# Patient Record
Sex: FEMALE | Employment: FULL TIME | URBAN - METROPOLITAN AREA
[De-identification: names, ages, dates, MRNs, and addresses within clinical notes are randomized per-mention and may not be internally consistent; named-entity substitution may affect disease eponyms.]

---

## 2020-08-28 ENCOUNTER — OFFICE VISIT (OUTPATIENT)
Dept: FAMILY MEDICINE CLINIC | Facility: CLINIC | Age: 40
End: 2020-08-28
Payer: COMMERCIAL

## 2020-08-28 VITALS
SYSTOLIC BLOOD PRESSURE: 146 MMHG | RESPIRATION RATE: 18 BRPM | WEIGHT: 204.5 LBS | HEIGHT: 66 IN | BODY MASS INDEX: 32.87 KG/M2 | HEART RATE: 98 BPM | DIASTOLIC BLOOD PRESSURE: 88 MMHG

## 2020-08-28 DIAGNOSIS — I10 ESSENTIAL HYPERTENSION: ICD-10-CM

## 2020-08-28 DIAGNOSIS — G89.29 CHRONIC PAIN OF RIGHT KNEE: Primary | ICD-10-CM

## 2020-08-28 DIAGNOSIS — M25.561 CHRONIC PAIN OF RIGHT KNEE: Primary | ICD-10-CM
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
## 2020-08-28 PROCEDURE — 3079F DIAST BP 80-89 MM HG: CPT | Performed by: PHYSICIAN ASSISTANT

## 2020-08-28 PROCEDURE — 3008F BODY MASS INDEX DOCD: CPT | Performed by: PHYSICIAN ASSISTANT

## 2020-08-28 PROCEDURE — 3077F SYST BP >= 140 MM HG: CPT | Performed by: PHYSICIAN ASSISTANT

## 2020-08-28 PROCEDURE — 99202 OFFICE O/P NEW SF 15 MIN: CPT | Performed by: PHYSICIAN ASSISTANT

## 2020-08-28 RX ORDER — TRAMADOL HYDROCHLORIDE 50 MG/1
50 TABLET ORAL EVERY 8 HOURS PRN
COMMUNITY
Start: 2020-08-23 | End: 2020-08-28

## 2020-08-28 RX ORDER — IBUPROFEN 800 MG/1
800 TABLET ORAL EVERY 8 HOURS PRN
COMMUNITY
Start: 2020-05-09 | End: 2020-08-28

## 2020-08-28 RX ORDER — IBUPROFEN 800 MG/1
800 TABLET ORAL EVERY 8 HOURS PRN
Qty: 90 TABLET | Refills: 1 | Status: SHIPPED | OUTPATIENT
Start: 2020-08-28 | End: 2021-09-10

## 2020-08-28 RX ORDER — TRAMADOL HYDROCHLORIDE 50 MG/1
50 TABLET ORAL EVERY 8 HOURS PRN
Qty: 30 TABLET | Refills: 2 | Status: SHIPPED | OUTPATIENT
Start: 2020-08-28 | End: 2020-09-22

## 2020-08-28 NOTE — PROGRESS NOTES
HPI:     HPI  38year-old female is here in the office and complaints of right knee pain for years. Patient had right knee surgery in 2015. The pain is at a severity of 8/10, does not radiate.    Patient denies of chest pain, SOB, N/V/C/D, fever, dizziness, Frequency: Monthly or less        Drinks per session: 1 or 2        Binge frequency: Never      Drug use: Never      Sexual activity: Not on file    Lifestyle      Physical activity:        Days per week: Not on file        Minutes per session: Not on file atraumatic. Right Ear: External ear normal.   Left Ear: External ear normal.   Nose: Nose normal.   Eyes: Conjunctivae are normal. Right eye exhibits no discharge. Left eye exhibits no discharge.    Cardiovascular: Normal rate, regular rhythm, S1 normal,

## 2020-08-28 NOTE — ASSESSMENT & PLAN NOTE
Patient has Lisinopril ( unsure dosing) at home but does not take it. Advise patient to take Lisinopril daily.

## 2020-09-22 RX ORDER — TRAMADOL HYDROCHLORIDE 50 MG/1
50 TABLET ORAL EVERY 8 HOURS PRN
Qty: 30 TABLET | Refills: 2 | OUTPATIENT
Start: 2020-09-22

## 2020-09-22 RX ORDER — TRAMADOL HYDROCHLORIDE 50 MG/1
50 TABLET ORAL EVERY 8 HOURS PRN
Qty: 30 TABLET | Refills: 2 | Status: SHIPPED | OUTPATIENT
Start: 2020-09-22 | End: 2020-10-19

## 2020-10-20 RX ORDER — TRAMADOL HYDROCHLORIDE 50 MG/1
50 TABLET ORAL EVERY 8 HOURS PRN
Qty: 30 TABLET | Refills: 2 | Status: SHIPPED | OUTPATIENT
Start: 2020-10-20 | End: 2020-10-24

## 2020-10-24 ENCOUNTER — TELEPHONE (OUTPATIENT)
Dept: FAMILY MEDICINE CLINIC | Facility: CLINIC | Age: 40
End: 2020-10-24

## 2020-10-24 RX ORDER — TRAMADOL HYDROCHLORIDE 50 MG/1
50 TABLET ORAL EVERY 8 HOURS PRN
Qty: 30 TABLET | Refills: 2 | Status: SHIPPED | OUTPATIENT
Start: 2020-10-24 | End: 2020-12-15

## 2020-10-24 NOTE — TELEPHONE ENCOUNTER
Shoaib/ Pharmacist of [address] [place], [place] [address]linnette Doll is requesting that script traMADol HCl 50 MG Oral Tab be transferred to their pharmacy in [facility].     Fax# [phone]

## 2020-10-24 NOTE — TELEPHONE ENCOUNTER
Chart reviewed, the rx was sent to the [facility] on [address] in [facility], it should actually go to [id number] in [place] [address], sent to provider to be signed and routed to the new pharmacy

## 2020-10-26 RX ORDER — TRAMADOL HYDROCHLORIDE 50 MG/1
50 TABLET ORAL EVERY 8 HOURS PRN
Qty: 30 TABLET | Refills: 0 | OUTPATIENT
Start: 2020-10-26

## 2020-11-13 RX ORDER — TRAMADOL HYDROCHLORIDE 50 MG/1
50 TABLET ORAL EVERY 8 HOURS PRN
Qty: 30 TABLET | Refills: 2 | OUTPATIENT
Start: 2020-11-13

## 2020-11-14 RX ORDER — TRAMADOL HYDROCHLORIDE 50 MG/1
50 TABLET ORAL EVERY 8 HOURS PRN
Qty: 30 TABLET | Refills: 2 | OUTPATIENT
Start: 2020-11-14

## 2020-11-17 RX ORDER — TRAMADOL HYDROCHLORIDE 50 MG/1
50 TABLET ORAL EVERY 8 HOURS PRN
Qty: 30 TABLET | Refills: 0 | OUTPATIENT
Start: 2020-11-17

## 2020-12-15 ENCOUNTER — OFFICE VISIT (OUTPATIENT)
Dept: FAMILY MEDICINE CLINIC | Facility: CLINIC | Age: 40
End: 2020-12-15
Payer: COMMERCIAL

## 2020-12-15 VITALS
HEART RATE: 104 BPM | HEIGHT: 66 IN | SYSTOLIC BLOOD PRESSURE: 137 MMHG | WEIGHT: 199 LBS | DIASTOLIC BLOOD PRESSURE: 80 MMHG | BODY MASS INDEX: 31.98 KG/M2

## 2020-12-15 DIAGNOSIS — Z12.31 ENCOUNTER FOR SCREENING MAMMOGRAM FOR BREAST CANCER: Primary | ICD-10-CM

## 2020-12-15 DIAGNOSIS — Z00.00 WELLNESS EXAMINATION: ICD-10-CM

## 2020-12-15 DIAGNOSIS — G89.29 CHRONIC PAIN OF RIGHT KNEE: ICD-10-CM

## 2020-12-15 DIAGNOSIS — M25.561 CHRONIC PAIN OF RIGHT KNEE: ICD-10-CM

## 2020-12-15 DIAGNOSIS — Z00.00 ROUTINE GENERAL MEDICAL EXAMINATION AT A HEALTH CARE FACILITY: ICD-10-CM

## 2020-12-15 PROCEDURE — 3008F BODY MASS INDEX DOCD: CPT | Performed by: PHYSICIAN ASSISTANT

## 2020-12-15 PROCEDURE — 3079F DIAST BP 80-89 MM HG: CPT | Performed by: PHYSICIAN ASSISTANT

## 2020-12-15 PROCEDURE — 99396 PREV VISIT EST AGE 40-64: CPT | Performed by: PHYSICIAN ASSISTANT

## 2020-12-15 PROCEDURE — 3075F SYST BP GE 130 - 139MM HG: CPT | Performed by: PHYSICIAN ASSISTANT

## 2020-12-15 RX ORDER — TRAMADOL HYDROCHLORIDE 50 MG/1
50 TABLET ORAL EVERY 8 HOURS PRN
Qty: 30 TABLET | Refills: 2 | OUTPATIENT
Start: 2020-12-15

## 2020-12-15 RX ORDER — TRAMADOL HYDROCHLORIDE 50 MG/1
50 TABLET ORAL EVERY 8 HOURS PRN
Qty: 30 TABLET | Refills: 2 | Status: SHIPPED | OUTPATIENT
Start: 2020-12-15 | End: 2021-03-09

## 2020-12-15 NOTE — PATIENT INSTRUCTIONS
Prevention Guidelines, Women Ages 36 to 52  Screening tests and vaccines are an important part of managing your health. A screening test is done to find diseases in people who don't have any symptoms.  The goal is to find a disease early so lifestyle govea sigmoidoscopy every 5 years, or  · Colonoscopy every 10 years, or  · CT colonography (virtual colonoscopy) every 5 years, or  · Yearly fecal occult blood test, or  · Yearly fecal immunochemical test every year, or  · Stool DNA test, every 3 years  If you c least 4 weeks after the first dose   Hepatitis A Women at increased risk for infection–talk with your healthcare provider 2 doses given 6 months apart   Hepatitis B Women at increased risk for infection–talk with your healthcare provider 3 doses over 6 mon American Academy of Ophthalmology  Preet last reviewed this educational content on 11/1/2017  © 1622-4172 [employer] [address] [address]. [address] [facility], [address] [place] [address]. All rights reserved.  This information is not intended as a substitute for pro

## 2020-12-16 NOTE — PROGRESS NOTES
HPI:   Marilu Bernal is a 38year old female who presents for an Annual Health Visit. Patient complaints of chronic right knee pain and requests a referral for Ortho. Patient is currently taking Tramadol as needed for pain with moderate relief.   Vicky Hardy Wt Readings from Last 6 Encounters:  12/15/20 : 199 lb (90.3 kg)  08/28/20 : 204 lb 8 oz (92.8 kg)    Body mass index is 32.12 kg/m². Physical Exam   Constitutional: She is oriented to person, place, and time.  She appears well-developed and well-nourish -     ORTHOPEDIC - INTERNAL    Other orders  -     traMADol HCl 50 MG Oral Tab; Take 1 tablet (50 mg total) by mouth every 8 (eight) hours as needed.       Overall health discussed, exercise/activity appropriate for age and health status, heathy diety, prev Cervical cancer All women in this age group, except women who have had a complete hysterectomy Pap test every 3 years or Pap test plus human papilloma virus (HPV) test every 5 years   Colorectal cancer Women age 39 years and older at average risk Multiple Vision All women in this age group Complete exam at age 36 and eye exams every 2 to 4 years. If you have a chronic disease, ask your healthcare provider how often you should have your eyes examined. 4   Vaccine Who needs it How often   Chickenpox (varicella Domestic violence Women at the age in which they are able to have children At routine exams   Sexually transmitted infection prevention Women at increased risk for infection–talk with your healthcare provider At routine exams   Use of tobacco and the healt

## 2021-01-06 ENCOUNTER — TELEPHONE (OUTPATIENT)
Dept: SCHEDULING | Age: 41
End: 2021-01-06

## 2021-01-06 RX ORDER — TRAMADOL HYDROCHLORIDE 50 MG/1
50 TABLET ORAL EVERY 8 HOURS PRN
Qty: 30 TABLET | Refills: 2 | OUTPATIENT
Start: 2021-01-06

## 2021-01-07 RX ORDER — TRAMADOL HYDROCHLORIDE 50 MG/1
50 TABLET ORAL EVERY 8 HOURS PRN
Qty: 30 TABLET | Refills: 2 | OUTPATIENT
Start: 2021-01-07

## 2021-01-08 ENCOUNTER — PATIENT MESSAGE (OUTPATIENT)
Dept: OTHER | Age: 41
End: 2021-01-08

## 2021-01-08 RX ORDER — TRAMADOL HYDROCHLORIDE 50 MG/1
50 TABLET ORAL EVERY 6 HOURS PRN
Qty: 30 TABLET | Refills: 0 | Status: SHIPPED | OUTPATIENT
Start: 2021-01-08 | End: 2021-01-21

## 2021-01-08 RX ORDER — TRAMADOL HYDROCHLORIDE 50 MG/1
50 TABLET ORAL EVERY 8 HOURS PRN
Qty: 30 TABLET | Refills: 2 | OUTPATIENT
Start: 2021-01-08

## 2021-01-08 NOTE — TELEPHONE ENCOUNTER
Patient following up for refills of Tramadol, patient reports has been taking Tramadol 3-4 tabs daily, reports has a lot of pain and is scheduling MRI soon.  Confirmed with pharmacy, patient has picked up all 3 refills of Tramadol sent 12/15, refills will l

## 2021-01-09 NOTE — TELEPHONE ENCOUNTER
Please advise patient to follow up with Ortho and have MRI of right knee done. Will not refill next time if MRI not done or follow up with Ortho.

## 2021-01-13 ENCOUNTER — TELEPHONE (OUTPATIENT)
Dept: SCHEDULING | Age: 41
End: 2021-01-13

## 2021-01-16 NOTE — TELEPHONE ENCOUNTER
Patient is calling to follow up regarding order for MRI of the knee. Patient states order needs to be updated on whether it should be with or without dye. Patient states she can not schedule appointment until this is done.

## 2021-01-18 ENCOUNTER — TELEPHONE (OUTPATIENT)
Dept: SCHEDULING | Age: 41
End: 2021-01-18

## 2021-01-19 NOTE — TELEPHONE ENCOUNTER
Called patient & left voice mail to call office back. I need phone number for place where patient will be getting MRI done to clarify MRI order as requested below.

## 2021-01-20 DIAGNOSIS — G89.29 CHRONIC PAIN OF RIGHT KNEE: Primary | ICD-10-CM

## 2021-01-20 DIAGNOSIS — M25.561 CHRONIC PAIN OF RIGHT KNEE: Primary | ICD-10-CM

## 2021-01-22 ENCOUNTER — HOSPITAL ENCOUNTER (OUTPATIENT)
Dept: MRI IMAGING | Age: 41
Discharge: HOME | End: 2021-01-22
Attending: PHYSICIAN ASSISTANT

## 2021-01-22 DIAGNOSIS — G89.29 CHRONIC PAIN OF RIGHT KNEE: ICD-10-CM

## 2021-01-22 DIAGNOSIS — M25.561 CHRONIC PAIN OF RIGHT KNEE: ICD-10-CM

## 2021-01-22 PROCEDURE — 73721 MRI JNT OF LWR EXTRE W/O DYE: CPT

## 2021-01-22 RX ORDER — TRAMADOL HYDROCHLORIDE 50 MG/1
50 TABLET ORAL EVERY 6 HOURS PRN
Qty: 30 TABLET | Refills: 0 | Status: SHIPPED | OUTPATIENT
Start: 2021-01-22 | End: 2021-02-01

## 2021-01-25 ENCOUNTER — TELEPHONE (OUTPATIENT)
Dept: FAMILY MEDICINE CLINIC | Facility: CLINIC | Age: 41
End: 2021-01-25

## 2021-01-25 NOTE — TELEPHONE ENCOUNTER
Pt came in to [facility]  asking if Bal Wild has received results of MRI. Please call when received with results.

## 2021-01-26 NOTE — TELEPHONE ENCOUNTER
No MRI results received as of today 1/26/21. Attempted to call patient to inform, have not yet received MRI results, once received will call back regarding results.

## 2021-01-27 NOTE — TELEPHONE ENCOUNTER
Called patient and asked where she got MRI done and she stated she got it done at [address] [place] [address] and asked if they can fax MRI report again, they stated they will fax it over today 1/27/21.

## 2021-01-29 NOTE — TELEPHONE ENCOUNTER
Called and left message for patient to call back regarding MRI of right knee result. Please transfer [phone].

## 2021-01-30 NOTE — TELEPHONE ENCOUNTER
Called and discussed with patient regarding MRI of right knee result on 1/29/21. Advise patient to follow up with Ortho and continue with Tramadol as needed for pain. Patient verbalized understanding.

## 2021-02-01 RX ORDER — TRAMADOL HYDROCHLORIDE 50 MG/1
50 TABLET ORAL EVERY 6 HOURS PRN
Qty: 30 TABLET | Refills: 0 | Status: SHIPPED | OUTPATIENT
Start: 2021-02-01 | End: 2021-02-10

## 2021-02-10 RX ORDER — TRAMADOL HYDROCHLORIDE 50 MG/1
TABLET ORAL
Qty: 30 TABLET | Refills: 0 | OUTPATIENT
Start: 2021-02-10

## 2021-02-11 ENCOUNTER — PATIENT MESSAGE (OUTPATIENT)
Dept: FAMILY MEDICINE CLINIC | Facility: CLINIC | Age: 41
End: 2021-02-11

## 2021-02-11 DIAGNOSIS — M25.561 CHRONIC PAIN OF RIGHT KNEE: Primary | ICD-10-CM

## 2021-02-11 DIAGNOSIS — G89.29 CHRONIC PAIN OF RIGHT KNEE: Primary | ICD-10-CM

## 2021-02-11 RX ORDER — TRAMADOL HYDROCHLORIDE 50 MG/1
50 TABLET ORAL EVERY 6 HOURS PRN
Qty: 30 TABLET | Refills: 0 | Status: SHIPPED | OUTPATIENT
Start: 2021-02-11 | End: 2021-02-22

## 2021-02-11 NOTE — TELEPHONE ENCOUNTER
From: Keyon Bearden  To: Jen Telles PA-C  Sent: 2/11/2021 9:31 AM CST  Subject: Other    Hi Dr Rory Mcclellan called my Insurance to have it changed to [facility]. It will take effect starting March 1.  When it does I will need the name of a ortho

## 2021-02-21 RX ORDER — TRAMADOL HYDROCHLORIDE 50 MG/1
50 TABLET ORAL EVERY 6 HOURS PRN
Qty: 30 TABLET | Refills: 0 | OUTPATIENT
Start: 2021-02-21

## 2021-02-22 ENCOUNTER — PATIENT MESSAGE (OUTPATIENT)
Dept: FAMILY MEDICINE CLINIC | Facility: CLINIC | Age: 41
End: 2021-02-22

## 2021-02-22 NOTE — TELEPHONE ENCOUNTER
From: Sharyle Rail  To: Dee Flores PA-C  Sent: 2/22/2021 11:34 AM CST  Subject: Other    Hi Dr Luiz Rodriguez  Can you please give me the name of the ortho doctor that you would like to refer me.      Thank Hancock Ing

## 2021-02-22 NOTE — TELEPHONE ENCOUNTER
From: William Contreras  To: Ivana Holcomb PA-C  Sent: 2/22/2021 1:19 PM CST  Subject: Referral Request    Hi Dr. Jose Gonzalez  I wanted to know if you can send me a refill prescription for the tramadol. Let me know.     Thank you   Lacie Scheuermann

## 2021-02-23 RX ORDER — TRAMADOL HYDROCHLORIDE 50 MG/1
50 TABLET ORAL EVERY 6 HOURS PRN
Qty: 30 TABLET | Refills: 0 | OUTPATIENT
Start: 2021-02-23

## 2021-02-23 RX ORDER — TRAMADOL HYDROCHLORIDE 50 MG/1
50 TABLET ORAL EVERY 6 HOURS PRN
Qty: 30 TABLET | Refills: 0 | Status: SHIPPED | OUTPATIENT
Start: 2021-02-23 | End: 2021-03-05

## 2021-03-04 RX ORDER — TRAMADOL HYDROCHLORIDE 50 MG/1
50 TABLET ORAL EVERY 6 HOURS PRN
Qty: 30 TABLET | Refills: 0 | OUTPATIENT
Start: 2021-03-04

## 2021-03-05 ENCOUNTER — LAB ENCOUNTER (OUTPATIENT)
Dept: LAB | Age: 41
End: 2021-03-05
Attending: PHYSICIAN ASSISTANT
Payer: COMMERCIAL

## 2021-03-05 DIAGNOSIS — Z00.00 ROUTINE GENERAL MEDICAL EXAMINATION AT A HEALTH CARE FACILITY: ICD-10-CM

## 2021-03-05 LAB
ALT SERPL-CCNC: 19 U/L
ANION GAP SERPL CALC-SCNC: 5 MMOL/L (ref 0–18)
AST SERPL-CCNC: 10 U/L (ref 15–37)
BASOPHILS # BLD AUTO: 0.05 X10(3) UL (ref 0–0.2)
BASOPHILS NFR BLD AUTO: 0.5 %
BUN BLD-MCNC: 10 MG/DL (ref 7–18)
BUN/CREAT SERPL: 14.3 (ref 10–20)
CALCIUM BLD-MCNC: 8.8 MG/DL (ref 8.5–10.1)
CHLORIDE SERPL-SCNC: 106 MMOL/L (ref 98–112)
CO2 SERPL-SCNC: 27 MMOL/L (ref 21–32)
CREAT BLD-MCNC: 0.7 MG/DL
DEPRECATED RDW RBC AUTO: 46.3 FL (ref 35.1–46.3)
EOSINOPHIL # BLD AUTO: 0.2 X10(3) UL (ref 0–0.7)
EOSINOPHIL NFR BLD AUTO: 2 %
ERYTHROCYTE [DISTWIDTH] IN BLOOD BY AUTOMATED COUNT: 14.2 % (ref 11–15)
EST. AVERAGE GLUCOSE BLD GHB EST-MCNC: 140 MG/DL (ref 68–126)
GLUCOSE BLD-MCNC: 202 MG/DL (ref 70–99)
HBA1C MFR BLD HPLC: 6.5 % (ref ?–5.7)
HCT VFR BLD AUTO: 41 %
HGB BLD-MCNC: 13.6 G/DL
IMM GRANULOCYTES # BLD AUTO: 0.02 X10(3) UL (ref 0–1)
IMM GRANULOCYTES NFR BLD: 0.2 %
LYMPHOCYTES # BLD AUTO: 3.27 X10(3) UL (ref 1–4)
LYMPHOCYTES NFR BLD AUTO: 33.5 %
MCH RBC QN AUTO: 29.8 PG (ref 26–34)
MCHC RBC AUTO-ENTMCNC: 33.2 G/DL (ref 31–37)
MCV RBC AUTO: 89.9 FL
MONOCYTES # BLD AUTO: 0.62 X10(3) UL (ref 0.1–1)
MONOCYTES NFR BLD AUTO: 6.4 %
NEUTROPHILS # BLD AUTO: 5.6 X10 (3) UL (ref 1.5–7.7)
NEUTROPHILS # BLD AUTO: 5.6 X10(3) UL (ref 1.5–7.7)
NEUTROPHILS NFR BLD AUTO: 57.4 %
OSMOLALITY SERPL CALC.SUM OF ELEC: 291 MOSM/KG (ref 275–295)
PATIENT FASTING Y/N/NP: NO
PLATELET # BLD AUTO: 312 10(3)UL (ref 150–450)
POTASSIUM SERPL-SCNC: 3.7 MMOL/L (ref 3.5–5.1)
RBC # BLD AUTO: 4.56 X10(6)UL
SODIUM SERPL-SCNC: 138 MMOL/L (ref 136–145)
TSI SER-ACNC: 1.27 MIU/ML (ref 0.36–3.74)
WBC # BLD AUTO: 9.8 X10(3) UL (ref 4–11)

## 2021-03-05 PROCEDURE — 82306 VITAMIN D 25 HYDROXY: CPT

## 2021-03-05 PROCEDURE — 83036 HEMOGLOBIN GLYCOSYLATED A1C: CPT

## 2021-03-05 PROCEDURE — 36415 COLL VENOUS BLD VENIPUNCTURE: CPT

## 2021-03-05 PROCEDURE — 84443 ASSAY THYROID STIM HORMONE: CPT

## 2021-03-05 PROCEDURE — 84460 ALANINE AMINO (ALT) (SGPT): CPT

## 2021-03-05 PROCEDURE — 80048 BASIC METABOLIC PNL TOTAL CA: CPT

## 2021-03-05 PROCEDURE — 85025 COMPLETE CBC W/AUTO DIFF WBC: CPT

## 2021-03-05 PROCEDURE — 84450 TRANSFERASE (AST) (SGOT): CPT

## 2021-03-05 RX ORDER — TRAMADOL HYDROCHLORIDE 50 MG/1
50 TABLET ORAL EVERY 6 HOURS PRN
Qty: 30 TABLET | Refills: 0 | OUTPATIENT
Start: 2021-03-05

## 2021-03-05 RX ORDER — TRAMADOL HYDROCHLORIDE 50 MG/1
50 TABLET ORAL EVERY 6 HOURS PRN
Qty: 30 TABLET | Refills: 0 | Status: SHIPPED | OUTPATIENT
Start: 2021-03-05 | End: 2021-03-15

## 2021-03-05 NOTE — TELEPHONE ENCOUNTER
Patient calling  and states that she requested for refill request BUT DENIED . States that she called ortho and with upcoming appointment on 3/15/21.   States that Trinity Ziegler is the one prescribing the tramadol and was told last office visit 12/15/21 to [place]

## 2021-03-06 ENCOUNTER — TELEPHONE (OUTPATIENT)
Dept: FAMILY MEDICINE CLINIC | Facility: CLINIC | Age: 41
End: 2021-03-06

## 2021-03-06 NOTE — TELEPHONE ENCOUNTER
----- Message from Jen Telles PA-C sent at 3/6/2021 12:08 PM CST -----  Abnormal lab results. Please schedule to discuss.

## 2021-03-08 LAB — 25(OH)D3 SERPL-MCNC: 20.9 NG/ML (ref 30–100)

## 2021-03-09 ENCOUNTER — VIRTUAL PHONE E/M (OUTPATIENT)
Dept: FAMILY MEDICINE CLINIC | Facility: CLINIC | Age: 41
End: 2021-03-09

## 2021-03-09 DIAGNOSIS — E11.9 DIABETES MELLITUS WITHOUT COMPLICATION (HCC): Primary | ICD-10-CM

## 2021-03-09 DIAGNOSIS — M25.561 CHRONIC PAIN OF RIGHT KNEE: ICD-10-CM

## 2021-03-09 DIAGNOSIS — G89.29 CHRONIC PAIN OF RIGHT KNEE: ICD-10-CM

## 2021-03-09 DIAGNOSIS — E55.9 VITAMIN D DEFICIENCY: ICD-10-CM

## 2021-03-09 PROCEDURE — 99213 OFFICE O/P EST LOW 20 MIN: CPT | Performed by: PHYSICIAN ASSISTANT

## 2021-03-10 RX ORDER — ERGOCALCIFEROL 1.25 MG/1
50000 CAPSULE ORAL
Qty: 12 CAPSULE | Refills: 3 | Status: SHIPPED | OUTPATIENT
Start: 2021-03-10 | End: 2021-06-08
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
## 2021-03-15 RX ORDER — TRAMADOL HYDROCHLORIDE 50 MG/1
50 TABLET ORAL EVERY 6 HOURS PRN
Qty: 30 TABLET | Refills: 0 | Status: SHIPPED | OUTPATIENT
Start: 2021-03-15 | End: 2021-03-24

## 2021-03-18 DIAGNOSIS — Z23 NEED FOR VACCINATION: ICD-10-CM

## 2021-03-24 RX ORDER — TRAMADOL HYDROCHLORIDE 50 MG/1
50 TABLET ORAL EVERY 6 HOURS PRN
Qty: 30 TABLET | Refills: 0 | Status: SHIPPED | OUTPATIENT
Start: 2021-03-24 | End: 2021-03-31

## 2021-04-01 RX ORDER — TRAMADOL HYDROCHLORIDE 50 MG/1
50 TABLET ORAL EVERY 6 HOURS PRN
Qty: 30 TABLET | Refills: 0 | Status: SHIPPED | OUTPATIENT
Start: 2021-04-01 | End: 2021-04-09

## 2021-04-06 ENCOUNTER — OFFICE VISIT (OUTPATIENT)
Dept: ORTHOPEDICS CLINIC | Facility: CLINIC | Age: 41
End: 2021-04-06

## 2021-04-06 ENCOUNTER — HOSPITAL ENCOUNTER (OUTPATIENT)
Dept: GENERAL RADIOLOGY | Facility: HOSPITAL | Age: 41
Discharge: HOME | End: 2021-04-06
Attending: ORTHOPAEDIC SURGERY
Payer: COMMERCIAL

## 2021-04-06 VITALS — WEIGHT: 179 LBS | BODY MASS INDEX: 28.77 KG/M2 | HEIGHT: 66 IN

## 2021-04-06 DIAGNOSIS — M76.51 PATELLAR TENDINITIS OF RIGHT KNEE: Primary | ICD-10-CM

## 2021-04-06 DIAGNOSIS — G89.29 CHRONIC PAIN OF RIGHT KNEE: ICD-10-CM

## 2021-04-06 DIAGNOSIS — M25.561 RIGHT KNEE PAIN, UNSPECIFIED CHRONICITY: ICD-10-CM

## 2021-04-06 DIAGNOSIS — E66.3 OVERWEIGHT (BMI 25.0-29.9): ICD-10-CM

## 2021-04-06 DIAGNOSIS — M25.561 CHRONIC PAIN OF RIGHT KNEE: ICD-10-CM

## 2021-04-06 PROCEDURE — 73562 X-RAY EXAM OF KNEE 3: CPT | Performed by: ORTHOPAEDIC SURGERY

## 2021-04-06 PROCEDURE — 3008F BODY MASS INDEX DOCD: CPT | Performed by: ORTHOPAEDIC SURGERY

## 2021-04-06 PROCEDURE — 99244 OFF/OP CNSLTJ NEW/EST MOD 40: CPT | Performed by: ORTHOPAEDIC SURGERY

## 2021-04-06 NOTE — H&P
NURSING INTAKE COMMENTS: Patient presents with:  Knee Pain: right knee pain fallin 2016with arthroscopy, pain ever since. Instablity. HPI: This 38year old female presents today for another opinion for chronic right knee pain since 2016.   She had art activity: Not on file       Review of Systems:  GENERAL: feels generally well, no significant weight loss or weight gain  SKIN: no ulcerated or worrisome skin lesions  EYES:denies blurred vision or double vision  HEENT: denies new nasal congestion, sinus p Plan:  Diagnoses and all orders for this visit:    Patellar tendinitis of right knee  -     PHYSICAL THERAPY - INTERNAL    Right knee pain, unspecified chronicity  -     XR KNEE ROUTINE (3 VIEWS), RIGHT (CPT=73562);  Future  -     PHYSICAL THERAPY - INTERNA

## 2021-04-09 RX ORDER — TRAMADOL HYDROCHLORIDE 50 MG/1
50 TABLET ORAL EVERY 6 HOURS PRN
Qty: 120 TABLET | Refills: 0 | Status: SHIPPED | OUTPATIENT
Start: 2021-04-09 | End: 2021-05-05

## 2021-04-09 NOTE — TELEPHONE ENCOUNTER
Called and left message for patient to call back regarding her medication. Please transfer ext [phone].

## 2021-04-09 NOTE — TELEPHONE ENCOUNTER
Patient returned call. Was seen Ortho 2 days ago, recommended PT and continue with meds. Patient takes Tramadol 50 mg QID. Rx sent to pharmacy.

## 2021-04-30 ENCOUNTER — OFFICE VISIT (OUTPATIENT)
Dept: PHYSICAL THERAPY | Facility: HOSPITAL | Age: 41
End: 2021-04-30
Attending: ORTHOPAEDIC SURGERY
Payer: COMMERCIAL

## 2021-04-30 DIAGNOSIS — M25.561 CHRONIC PAIN OF RIGHT KNEE: ICD-10-CM

## 2021-04-30 DIAGNOSIS — M25.561 RIGHT KNEE PAIN, UNSPECIFIED CHRONICITY: ICD-10-CM

## 2021-04-30 DIAGNOSIS — M76.51 PATELLAR TENDINITIS OF RIGHT KNEE: ICD-10-CM

## 2021-04-30 DIAGNOSIS — G89.29 CHRONIC PAIN OF RIGHT KNEE: ICD-10-CM

## 2021-04-30 PROCEDURE — 97110 THERAPEUTIC EXERCISES: CPT

## 2021-04-30 PROCEDURE — 97162 PT EVAL MOD COMPLEX 30 MIN: CPT

## 2021-04-30 NOTE — PROGRESS NOTES
LOWER EXTREMITY EVALUATION:   Referring Physician: Dr. Mary Garcia  Diagnosis: M25.561 (ICD-10-CM) - 719.46 (ICD-9-CM) - Right knee pain, unspecified chronicity    M25.561, G89.29 (ICD-10-CM) - 719.46, 338.29 (ICD-9-CM) - Chronic pain of right knee    M76. mobility, (-) instability tests, TTP along patellar tendon, deviated gait, RLE weakness. Functional deficits include but are not limited to difficulty with stairs, squatting,prolonged walking, prolonged standing.   Signs and symptoms are consistent with [facility] importance of remaining active. Patient was instructed in and issued a HEP for: GTB quad isometrics program seated. Tx: TCJ manip bilat. Tx response: patellar tendon pain-free to palpation, knee flexion improved to 110 reduced pain.      Charges: PT Eval plan of treatment and while under my care.     X___________________________________________________ Date____________________    Certification From: 0/82/9242  To:7/29/2021

## 2021-05-03 RX ORDER — TRAMADOL HYDROCHLORIDE 50 MG/1
50 TABLET ORAL EVERY 6 HOURS PRN
Qty: 120 TABLET | Refills: 0 | OUTPATIENT
Start: 2021-05-03 | End: 2021-06-02

## 2021-05-04 ENCOUNTER — APPOINTMENT (OUTPATIENT)
Dept: PHYSICAL THERAPY | Facility: HOSPITAL | Age: 41
End: 2021-05-04
Attending: ORTHOPAEDIC SURGERY
Payer: COMMERCIAL

## 2021-05-04 ENCOUNTER — PATIENT MESSAGE (OUTPATIENT)
Dept: FAMILY MEDICINE CLINIC | Facility: CLINIC | Age: 41
End: 2021-05-04

## 2021-05-04 ENCOUNTER — TELEPHONE (OUTPATIENT)
Dept: PHYSICAL THERAPY | Facility: HOSPITAL | Age: 41
End: 2021-05-04

## 2021-05-05 RX ORDER — TRAMADOL HYDROCHLORIDE 50 MG/1
50 TABLET ORAL EVERY 6 HOURS PRN
Qty: 120 TABLET | Refills: 0 | Status: SHIPPED | OUTPATIENT
Start: 2021-05-05 | End: 2021-06-02

## 2021-05-05 NOTE — TELEPHONE ENCOUNTER
From: Daniel Tejada  To: Kenisha Soler [place]  Sent: 5/4/2021 6:59 PM CDT  Subject: Referral Request    Hi Dr Areli Tineo    I am leaving out of town Thursday night for Mother's Day weekend with my family.  I wanna see if you can refill my tramadol yenny I'm very

## 2021-05-05 NOTE — TELEPHONE ENCOUNTER
Per patient she needs her traMADol HCl 2 days earlier due to patient is going out of town tomorrow night and she would like to pick it up tomorrow afternoon if possible.

## 2021-05-07 ENCOUNTER — APPOINTMENT (OUTPATIENT)
Dept: PHYSICAL THERAPY | Facility: HOSPITAL | Age: 41
End: 2021-05-07
Attending: ORTHOPAEDIC SURGERY
Payer: COMMERCIAL

## 2021-05-11 ENCOUNTER — OFFICE VISIT (OUTPATIENT)
Dept: PHYSICAL THERAPY | Facility: HOSPITAL | Age: 41
End: 2021-05-11
Attending: ORTHOPAEDIC SURGERY
Payer: COMMERCIAL

## 2021-05-11 DIAGNOSIS — G89.29 CHRONIC PAIN OF RIGHT KNEE: ICD-10-CM

## 2021-05-11 DIAGNOSIS — M25.561 CHRONIC PAIN OF RIGHT KNEE: ICD-10-CM

## 2021-05-11 DIAGNOSIS — M76.51 PATELLAR TENDINITIS OF RIGHT KNEE: ICD-10-CM

## 2021-05-11 DIAGNOSIS — M25.561 RIGHT KNEE PAIN, UNSPECIFIED CHRONICITY: ICD-10-CM

## 2021-05-11 PROCEDURE — 97140 MANUAL THERAPY 1/> REGIONS: CPT

## 2021-05-11 PROCEDURE — 97110 THERAPEUTIC EXERCISES: CPT

## 2021-05-13 NOTE — PROGRESS NOTES
Dx: M25.561 (ICD-10-CM) - 719.46 (ICD-9-CM) - Right knee pain, unspecified chronicity    M25.561, G89.29 (ICD-10-CM) - 719.46, 338.29 (ICD-9-CM) - Chronic pain of right knee    M76.51 (ICD-10-CM) - 726.64 (ICD-9-CM) - Patellar tendinitis of right knee board FWD and lateral x1 min ea  -Supine R heel slides x20  -seated R heel slide x20  -Standing hip abd 3x10 YTB bilat       HEP - progressed Blue TB seated TKE isometrics                Charges: MT, EX 3       Total Timed Treatment: MT 10 min, EX 38 min

## 2021-05-14 ENCOUNTER — OFFICE VISIT (OUTPATIENT)
Dept: PHYSICAL THERAPY | Facility: HOSPITAL | Age: 41
End: 2021-05-14
Attending: ORTHOPAEDIC SURGERY
Payer: COMMERCIAL

## 2021-05-14 DIAGNOSIS — M25.561 RIGHT KNEE PAIN, UNSPECIFIED CHRONICITY: ICD-10-CM

## 2021-05-14 DIAGNOSIS — G89.29 CHRONIC PAIN OF RIGHT KNEE: ICD-10-CM

## 2021-05-14 DIAGNOSIS — M25.561 CHRONIC PAIN OF RIGHT KNEE: ICD-10-CM

## 2021-05-14 DIAGNOSIS — M76.51 PATELLAR TENDINITIS OF RIGHT KNEE: ICD-10-CM

## 2021-05-14 PROCEDURE — 97110 THERAPEUTIC EXERCISES: CPT

## 2021-05-14 PROCEDURE — 97140 MANUAL THERAPY 1/> REGIONS: CPT

## 2021-05-14 NOTE — PROGRESS NOTES
Dx: M25.561 (ICD-10-CM) - 719.46 (ICD-9-CM) - Right knee pain, unspecified chronicity    M25.561, G89.29 (ICD-10-CM) - 719.46, 338.29 (ICD-9-CM) - Chronic pain of right knee    M76.51 (ICD-10-CM) - 726.64 (ICD-9-CM) - Patellar tendinitis of right knee x20  -seated R heel slide x20  -Standing hip abd 3x10 YTB bilat EX  -Patellar tendon Isometrics 4x45 sec 30 lb 1 min rest in between  -rocker board FWD and lateral x1 min ea  -Supine R heel slides 1 set to fatigue  -DL HR x10 slow and controlled  -STS 2x5

## 2021-05-18 ENCOUNTER — OFFICE VISIT (OUTPATIENT)
Dept: PHYSICAL THERAPY | Facility: HOSPITAL | Age: 41
End: 2021-05-18
Attending: ORTHOPAEDIC SURGERY
Payer: COMMERCIAL

## 2021-05-18 DIAGNOSIS — M25.561 RIGHT KNEE PAIN, UNSPECIFIED CHRONICITY: ICD-10-CM

## 2021-05-18 DIAGNOSIS — M25.561 CHRONIC PAIN OF RIGHT KNEE: ICD-10-CM

## 2021-05-18 DIAGNOSIS — M76.51 PATELLAR TENDINITIS OF RIGHT KNEE: ICD-10-CM

## 2021-05-18 DIAGNOSIS — G89.29 CHRONIC PAIN OF RIGHT KNEE: ICD-10-CM

## 2021-05-18 PROCEDURE — 97110 THERAPEUTIC EXERCISES: CPT

## 2021-05-18 PROCEDURE — 97140 MANUAL THERAPY 1/> REGIONS: CPT

## 2021-05-18 NOTE — PROGRESS NOTES
Dx: M25.561 (ICD-10-CM) - 719.46 (ICD-9-CM) - Right knee pain, unspecified chronicity    M25.561, G89.29 (ICD-10-CM) - 719.46, 338.29 (ICD-9-CM) - Chronic pain of right knee    M76.51 (ICD-10-CM) - 726.64 (ICD-9-CM) - Patellar tendinitis of right knee therapy to address jt restrictions and progress tendon loading program for patella. PROGRESS QUAD STRENGTHENING. Date: 5/11/2021  TX#: 2/8 Date:  5/14/2021      TX#: 3/8 Date: 5/18/2021     TX#: 4/8 Date:                 TX#: 5/ Date:    Tx#: 6/   MT  -pat

## 2021-05-21 ENCOUNTER — APPOINTMENT (OUTPATIENT)
Dept: PHYSICAL THERAPY | Facility: HOSPITAL | Age: 41
End: 2021-05-21
Attending: ORTHOPAEDIC SURGERY
Payer: COMMERCIAL

## 2021-05-25 ENCOUNTER — OFFICE VISIT (OUTPATIENT)
Dept: PHYSICAL THERAPY | Facility: HOSPITAL | Age: 41
End: 2021-05-25
Attending: ORTHOPAEDIC SURGERY
Payer: COMMERCIAL

## 2021-05-25 DIAGNOSIS — G89.29 CHRONIC PAIN OF RIGHT KNEE: ICD-10-CM

## 2021-05-25 DIAGNOSIS — M76.51 PATELLAR TENDINITIS OF RIGHT KNEE: ICD-10-CM

## 2021-05-25 DIAGNOSIS — M25.561 CHRONIC PAIN OF RIGHT KNEE: ICD-10-CM

## 2021-05-25 DIAGNOSIS — M25.561 RIGHT KNEE PAIN, UNSPECIFIED CHRONICITY: ICD-10-CM

## 2021-05-25 PROCEDURE — 97110 THERAPEUTIC EXERCISES: CPT

## 2021-05-25 PROCEDURE — 97140 MANUAL THERAPY 1/> REGIONS: CPT

## 2021-05-25 NOTE — PROGRESS NOTES
Dx: M25.561 (ICD-10-CM) - 719.46 (ICD-9-CM) - Right knee pain, unspecified chronicity    M25.561, G89.29 (ICD-10-CM) - 719.46, 338.29 (ICD-9-CM) - Chronic pain of right knee    M76.51 (ICD-10-CM) - 726.64 (ICD-9-CM) - Patellar tendinitis of right knee inferior glides  -R TCJ manip  -FMP joint technique into R hip flexion with MET holds MT  -patellar mobs gr 3 medial, inferior glides  -R TCJ manip  -R femoral long axis traction manipulation  -FMP joint technique into R hip flexion with MET holds MT  -pat

## 2021-06-02 ENCOUNTER — OFFICE VISIT (OUTPATIENT)
Dept: FAMILY MEDICINE CLINIC | Facility: CLINIC | Age: 41
End: 2021-06-02

## 2021-06-02 VITALS
WEIGHT: 209 LBS | HEART RATE: 92 BPM | HEIGHT: 66 IN | SYSTOLIC BLOOD PRESSURE: 154 MMHG | DIASTOLIC BLOOD PRESSURE: 94 MMHG | BODY MASS INDEX: 33.59 KG/M2

## 2021-06-02 DIAGNOSIS — I10 ESSENTIAL HYPERTENSION: ICD-10-CM

## 2021-06-02 DIAGNOSIS — G89.29 CHRONIC PAIN OF RIGHT KNEE: Primary | ICD-10-CM

## 2021-06-02 DIAGNOSIS — K14.8 TONGUE LESION: ICD-10-CM

## 2021-06-02 DIAGNOSIS — E55.9 VITAMIN D DEFICIENCY: ICD-10-CM

## 2021-06-02 DIAGNOSIS — E11.9 DIABETES MELLITUS WITHOUT COMPLICATION (HCC): ICD-10-CM

## 2021-06-02 DIAGNOSIS — M25.561 CHRONIC PAIN OF RIGHT KNEE: Primary | ICD-10-CM

## 2021-06-02 PROCEDURE — 3077F SYST BP >= 140 MM HG: CPT | Performed by: PHYSICIAN ASSISTANT

## 2021-06-02 PROCEDURE — 3008F BODY MASS INDEX DOCD: CPT | Performed by: PHYSICIAN ASSISTANT

## 2021-06-02 PROCEDURE — 3080F DIAST BP >= 90 MM HG: CPT | Performed by: PHYSICIAN ASSISTANT

## 2021-06-02 PROCEDURE — 99213 OFFICE O/P EST LOW 20 MIN: CPT | Performed by: PHYSICIAN ASSISTANT

## 2021-06-02 RX ORDER — TRAMADOL HYDROCHLORIDE 50 MG/1
50 TABLET ORAL EVERY 6 HOURS PRN
Qty: 120 TABLET | Refills: 1 | Status: SHIPPED | OUTPATIENT
Start: 2021-06-02 | End: 2021-07-02

## 2021-06-02 NOTE — PROGRESS NOTES
HPI:     HPI   38year-old female is here in the office for follow up. Patient complaints of chronic right knee pain, still taking Tramadol 50mg PO Q6 daily. Patient states that she bite her tongue last month, she saw the lump on the tongue.  No pain, or re education level: Not on file    Occupational History      Not on file    Tobacco Use      Smoking status: Current Some Day Smoker        Packs/day: 0.15        Years: 24.00        Pack years: 3.6      Smokeless tobacco: Never Used    Vaping Use      Vaping for dizziness, weakness, numbness and headaches. 06/02/21  1702 06/02/21  1732   BP: (!) 170/117 (!) 154/94   Pulse: 92    Weight: 209 lb (94.8 kg)    Height: 5' 6\" (1.676 m)      Body mass index is 33.73 kg/m².     Physical Exam:   Physical Exam VITAMIN D, 25-HYDROXY    Diabetes mellitus without complication ([facility] [address]        Relevant Orders    HEMOGLOBIN A1C    Tongue lesion        Relevant Orders    ENT - INTERNAL        Discussed plan of care with pt and pt is in agreement. All questions answered.  Pt

## 2021-06-03 ENCOUNTER — PATIENT MESSAGE (OUTPATIENT)
Dept: FAMILY MEDICINE CLINIC | Facility: CLINIC | Age: 41
End: 2021-06-03

## 2021-06-03 NOTE — TELEPHONE ENCOUNTER
Sathish Angeles=see patient's message, pended letter. From: Obey Greco  To:  Gaviota Betancur PA-C  Sent: 6/3/2021  6:35 AM CDT  Subject: Other    Hi Dr. Aguila Heading    Is there a possibility that you can do a note stating that I need to take couple days off t

## 2021-06-08 ENCOUNTER — APPOINTMENT (OUTPATIENT)
Dept: PHYSICAL THERAPY | Facility: HOSPITAL | Age: 41
End: 2021-06-08
Attending: ORTHOPAEDIC SURGERY
Payer: COMMERCIAL

## 2021-06-08 ENCOUNTER — TELEPHONE (OUTPATIENT)
Dept: PHYSICAL THERAPY | Facility: HOSPITAL | Age: 41
End: 2021-06-08

## 2021-06-15 ENCOUNTER — OFFICE VISIT (OUTPATIENT)
Dept: PHYSICAL THERAPY | Facility: HOSPITAL | Age: 41
End: 2021-06-15
Attending: ORTHOPAEDIC SURGERY
Payer: COMMERCIAL

## 2021-06-15 PROCEDURE — 97140 MANUAL THERAPY 1/> REGIONS: CPT

## 2021-06-15 PROCEDURE — 97110 THERAPEUTIC EXERCISES: CPT

## 2021-06-15 NOTE — PROGRESS NOTES
Dx: M25.561 (ICD-10-CM) - 719.46 (ICD-9-CM) - Right knee pain, unspecified chronicity    M25.561, G89.29 (ICD-10-CM) - 719.46, 338.29 (ICD-9-CM) - Chronic pain of right knee    M76.51 (ICD-10-CM) - 726.64 (ICD-9-CM) - Patellar tendinitis of right knee medial, inferior glides  -R TCJ manip  -FMP joint technique into R hip flexion with MET holds MT  -patellar mobs gr 3 medial, inferior glides  -R TCJ manip  -R femoral long axis traction manipulation  -FMP joint technique into R hip flexion with MET holds

## 2021-07-08 ENCOUNTER — OFFICE VISIT (OUTPATIENT)
Dept: PHYSICAL THERAPY | Facility: HOSPITAL | Age: 41
End: 2021-07-08
Attending: ORTHOPAEDIC SURGERY
Payer: COMMERCIAL

## 2021-07-08 PROCEDURE — 97140 MANUAL THERAPY 1/> REGIONS: CPT

## 2021-07-08 PROCEDURE — 97110 THERAPEUTIC EXERCISES: CPT

## 2021-07-09 NOTE — PROGRESS NOTES
Progress Summary  Pt has attended 7 visits in Physical Therapy    Dx: M25.561 (ICD-10-CM) - 719.46 (ICD-9-CM) - Right knee pain, unspecified chronicity    M25.561, G89.29 (ICD-10-CM) - 719.46, 338.29 (ICD-9-CM) - Chronic pain of right knee    M76.51 (ICD-1 difficulty.  (Reciprocal pattern but with pain)  At least 60 min walking to be able to go grocery shopping without difficulty max 3/10 pain (ABLE TO WALK 45-60 MIN with pain)  Pt will be able tolerate standing at least 1 hr to be able to perform chores at h

## 2021-07-13 ENCOUNTER — TELEPHONE (OUTPATIENT)
Dept: ORTHOPEDICS CLINIC | Facility: CLINIC | Age: 41
End: 2021-07-13

## 2021-07-13 DIAGNOSIS — M25.561 CHRONIC PAIN OF RIGHT KNEE: ICD-10-CM

## 2021-07-13 DIAGNOSIS — M25.561 RIGHT KNEE PAIN, UNSPECIFIED CHRONICITY: Primary | ICD-10-CM

## 2021-07-13 DIAGNOSIS — G89.29 CHRONIC PAIN OF RIGHT KNEE: ICD-10-CM

## 2021-07-13 DIAGNOSIS — M76.51 PATELLAR TENDINITIS OF RIGHT KNEE: ICD-10-CM

## 2021-07-19 ENCOUNTER — OFFICE VISIT (OUTPATIENT)
Dept: PHYSICAL THERAPY | Facility: HOSPITAL | Age: 41
End: 2021-07-19
Attending: ORTHOPAEDIC SURGERY
Payer: COMMERCIAL

## 2021-07-19 PROCEDURE — 97140 MANUAL THERAPY 1/> REGIONS: CPT

## 2021-07-19 PROCEDURE — 97110 THERAPEUTIC EXERCISES: CPT

## 2021-07-19 NOTE — PROGRESS NOTES
Dx: M25.561 (ICD-10-CM) - 719.46 (ICD-9-CM) - Right knee pain, unspecified chronicity    M25.561, G89.29 (ICD-10-CM) - 719.46, 338.29 (ICD-9-CM) - Chronic pain of right knee    M76.51 (ICD-10-CM) - 726.64 (ICD-9-CM) - Patellar tendinitis of right knee glides  -R medial tibial glide manip  -R TCJ manip MT  -patellar mobs gr 3 superior, inferior glides  -R medial tibial glide manip  -R TCJ manip    EX  -TEST AND MEASURES  -SLR 3x5 R  -R heel slides hip flex 2 sets to fatigue  -SL HR x10 bilat, DL HR x10

## 2021-07-27 ENCOUNTER — OFFICE VISIT (OUTPATIENT)
Dept: FAMILY MEDICINE CLINIC | Facility: CLINIC | Age: 41
End: 2021-07-27

## 2021-07-27 VITALS
WEIGHT: 216 LBS | HEIGHT: 66 IN | BODY MASS INDEX: 34.72 KG/M2 | HEART RATE: 96 BPM | DIASTOLIC BLOOD PRESSURE: 85 MMHG | SYSTOLIC BLOOD PRESSURE: 131 MMHG

## 2021-07-27 DIAGNOSIS — E55.9 VITAMIN D DEFICIENCY: ICD-10-CM

## 2021-07-27 DIAGNOSIS — E11.9 TYPE 2 DIABETES MELLITUS WITHOUT COMPLICATION, WITHOUT LONG-TERM CURRENT USE OF INSULIN (HCC): ICD-10-CM

## 2021-07-27 DIAGNOSIS — I10 ESSENTIAL HYPERTENSION: Primary | ICD-10-CM

## 2021-07-27 LAB
CARTRIDGE LOT#: 814 NUMERIC
HEMOGLOBIN A1C: 6.6 % (ref 4.3–5.6)

## 2021-07-27 PROCEDURE — 99213 OFFICE O/P EST LOW 20 MIN: CPT | Performed by: PHYSICIAN ASSISTANT

## 2021-07-27 PROCEDURE — 36415 COLL VENOUS BLD VENIPUNCTURE: CPT | Performed by: PHYSICIAN ASSISTANT

## 2021-07-27 PROCEDURE — 3008F BODY MASS INDEX DOCD: CPT | Performed by: PHYSICIAN ASSISTANT

## 2021-07-27 PROCEDURE — 3079F DIAST BP 80-89 MM HG: CPT | Performed by: PHYSICIAN ASSISTANT

## 2021-07-27 PROCEDURE — 3044F HG A1C LEVEL LT 7.0%: CPT | Performed by: PHYSICIAN ASSISTANT

## 2021-07-27 PROCEDURE — 83036 HEMOGLOBIN GLYCOSYLATED A1C: CPT | Performed by: PHYSICIAN ASSISTANT

## 2021-07-27 PROCEDURE — 3075F SYST BP GE 130 - 139MM HG: CPT | Performed by: PHYSICIAN ASSISTANT

## 2021-07-27 RX ORDER — ERGOCALCIFEROL 1.25 MG/1
50000 CAPSULE ORAL
Qty: 12 CAPSULE | Refills: 3 | Status: SHIPPED | OUTPATIENT
Start: 2021-07-27 | End: 2021-09-08

## 2021-07-27 RX ORDER — TRAMADOL HYDROCHLORIDE 50 MG/1
50 TABLET ORAL EVERY 6 HOURS PRN
Qty: 120 TABLET | Refills: 1 | Status: SHIPPED | OUTPATIENT
Start: 2021-07-27 | End: 2021-09-08

## 2021-08-02 ENCOUNTER — OFFICE VISIT (OUTPATIENT)
Dept: PHYSICAL THERAPY | Facility: HOSPITAL | Age: 41
End: 2021-08-02
Attending: ORTHOPAEDIC SURGERY
Payer: COMMERCIAL

## 2021-08-02 PROCEDURE — 97140 MANUAL THERAPY 1/> REGIONS: CPT

## 2021-08-02 PROCEDURE — 97110 THERAPEUTIC EXERCISES: CPT

## 2021-08-03 NOTE — PROGRESS NOTES
Dx: M25.561 (ICD-10-CM) - 719.46 (ICD-9-CM) - Right knee pain, unspecified chronicity    M25.561, G89.29 (ICD-10-CM) - 719.46, 338.29 (ICD-9-CM) - Chronic pain of right knee    M76.51 (ICD-10-CM) - 726.64 (ICD-9-CM) - Patellar tendinitis of right knee superior, inferior glides  -R medial tibial glide manip   EX  -TEST AND MEASURES  -SLR 3x5 R  -R heel slides hip flex 2 sets to fatigue  -SL HR x10 bilat, DL HR x10  -STS 3x5 EX  -SAQ R x20 hold 5 sec  -SLR 4x5 bolster bilat  -Bridging with band RTB 2x10

## 2021-08-15 ENCOUNTER — PATIENT MESSAGE (OUTPATIENT)
Dept: FAMILY MEDICINE CLINIC | Facility: CLINIC | Age: 41
End: 2021-08-15

## 2021-08-16 ENCOUNTER — APPOINTMENT (OUTPATIENT)
Dept: PHYSICAL THERAPY | Facility: HOSPITAL | Age: 41
End: 2021-08-16
Attending: ORTHOPAEDIC SURGERY
Payer: COMMERCIAL

## 2021-08-16 NOTE — TELEPHONE ENCOUNTER
Pt returned call. Informed her of message below.  She verbalized understanding and will call [place]

## 2021-08-16 NOTE — TELEPHONE ENCOUNTER
Per Patient: I have a refill for the tramadol scheduled on August 23rd. I am leaving to [facility] Tuesday after my Therapy. Is there any way that you can contact the pharmacy to get it refilled by Tuesday morning.  I will be back October 4th from [place]

## 2021-08-16 NOTE — TELEPHONE ENCOUNTER
Lmtcb, see below. Spoke w/pharmacy to let them know ok to fill Tramadol early. F/u with pharmacy for rx status/.

## 2021-08-19 ENCOUNTER — OFFICE VISIT (OUTPATIENT)
Dept: PHYSICAL THERAPY | Facility: HOSPITAL | Age: 41
End: 2021-08-19
Attending: PHYSICIAN ASSISTANT
Payer: COMMERCIAL

## 2021-08-19 ENCOUNTER — TELEPHONE (OUTPATIENT)
Dept: PHYSICAL THERAPY | Facility: HOSPITAL | Age: 41
End: 2021-08-19

## 2021-08-19 PROCEDURE — 97110 THERAPEUTIC EXERCISES: CPT

## 2021-08-19 PROCEDURE — 97140 MANUAL THERAPY 1/> REGIONS: CPT

## 2021-08-19 NOTE — PROGRESS NOTES
Discharge Summary  Pt has attended 10 visits in Physical Therapy     Dx: M25.561 (ICD-10-CM) - 719.46 (ICD-9-CM) - Right knee pain, unspecified chronicity    M25.561, G89.29 (ICD-10-CM) - 719.46, 338.29 (ICD-9-CM) - Chronic pain of right knee    M76.51 (IC ground at home (LIMITED TO 60 DEG knee flex pain-free)    FOTO: 35 at juan, 63 today    Plan: DC with full HEP as [place] of [place] 6 w[place].      Thank you for your referral. Please co-sign or sign and return this letter via fax as soon as poss

## 2021-08-30 ENCOUNTER — APPOINTMENT (OUTPATIENT)
Dept: PHYSICAL THERAPY | Facility: HOSPITAL | Age: 41
End: 2021-08-30
Attending: ORTHOPAEDIC SURGERY
Payer: COMMERCIAL

## 2021-09-08 ENCOUNTER — TELEPHONE (OUTPATIENT)
Dept: FAMILY MEDICINE CLINIC | Facility: CLINIC | Age: 41
End: 2021-09-08

## 2021-09-08 DIAGNOSIS — E55.9 VITAMIN D DEFICIENCY: ICD-10-CM

## 2021-09-08 RX ORDER — TRAMADOL HYDROCHLORIDE 50 MG/1
50 TABLET ORAL EVERY 6 HOURS PRN
Qty: 120 TABLET | Refills: 0 | Status: SHIPPED
Start: 2021-09-08 | End: 2021-10-07

## 2021-09-08 RX ORDER — LISINOPRIL 20 MG/1
20 TABLET ORAL DAILY
Qty: 90 TABLET | Refills: 1 | Status: SHIPPED | OUTPATIENT
Start: 2021-09-08 | End: 2022-02-03

## 2021-09-08 RX ORDER — ERGOCALCIFEROL 1.25 MG/1
50000 CAPSULE ORAL
Qty: 12 CAPSULE | Refills: 3 | Status: SHIPPED | OUTPATIENT
Start: 2021-09-08 | End: 2021-12-07

## 2021-09-08 NOTE — TELEPHONE ENCOUNTER
Pt states she received a call from Maciej Bailey at [facility] to call back. Advised no messages in the chart as to why she was called. Sent a webex to Maciej Bailey and she states she hasn't called the pt.      She states she left 8/17/21 and flew to [facility] and states

## 2021-09-08 NOTE — TELEPHONE ENCOUNTER
Called and discussed with patient that she will  the Tramadol on 09/12/21 ( pharmacy will fill that day). Patient will take Ibuprofen at this time. Patient verbalized understanding.

## 2021-09-08 NOTE — TELEPHONE ENCOUNTER
Patient calling as states pharmacy will not dispense scripts sent unless [place], [place] approves early refill.  Please advise if you would like the pharmacy to be informed that you approve of early refill of medications that were sent (likely just tramadol needs

## 2021-09-10 RX ORDER — IBUPROFEN 800 MG/1
800 TABLET ORAL EVERY 8 HOURS PRN
Qty: 90 TABLET | Refills: 1 | Status: SHIPPED | OUTPATIENT
Start: 2021-09-10

## 2021-09-10 NOTE — TELEPHONE ENCOUNTER
Please review refill protocol failed/ no protocol  Requested Prescriptions   Pending Prescriptions Disp Refills    ibuprofen 800 MG Oral Tab 90 tablet 1     Sig: Take 1 tablet (800 mg total) by mouth every 8 (eight) hours as needed.         There is no refi

## 2021-10-01 ENCOUNTER — TELEPHONE (OUTPATIENT)
Dept: FAMILY MEDICINE CLINIC | Facility: CLINIC | Age: 41
End: 2021-10-01

## 2021-10-01 DIAGNOSIS — Z12.31 ENCOUNTER FOR SCREENING MAMMOGRAM FOR MALIGNANT NEOPLASM OF BREAST: Primary | ICD-10-CM

## 2021-10-01 DIAGNOSIS — Z12.31 ENCOUNTER FOR SCREENING MAMMOGRAM FOR BREAST CANCER: ICD-10-CM

## 2021-10-01 NOTE — TELEPHONE ENCOUNTER
Patient states she scheduled a mammogram for 10/20 and asking if she needs a referral from [place], [place]. Informed patient she would.

## 2021-10-02 NOTE — TELEPHONE ENCOUNTER
Patient calling and wants to follow up her referral request, informed that Loraine Mckenzie is not in the office until next week Tuesday 10/5/21, informed  that  will wait for her physician to sign the referral order,stated understanding.

## 2021-10-02 NOTE — TELEPHONE ENCOUNTER
Seems like previous months refill was set to be filled on 9/12. She should have enough medication to last her until next week.

## 2021-10-02 NOTE — TELEPHONE ENCOUNTER
Patient calling and wants to follow up her tramadol refill, informed that Loraine Mckenzie is not in the office and she will be back next week Tuesday 10/5/21, we will try to send it to the on call provider  but cannot guarantee if she will be able to refill it

## 2021-10-04 RX ORDER — TRAMADOL HYDROCHLORIDE 50 MG/1
50 TABLET ORAL EVERY 6 HOURS PRN
Qty: 120 TABLET | Refills: 0 | OUTPATIENT
Start: 2021-10-04

## 2021-10-07 RX ORDER — TRAMADOL HYDROCHLORIDE 50 MG/1
50 TABLET ORAL EVERY 6 HOURS PRN
Qty: 120 TABLET | Refills: 0 | Status: SHIPPED | OUTPATIENT
Start: 2021-10-07 | End: 2021-11-06

## 2021-10-07 NOTE — TELEPHONE ENCOUNTER
Marilia Lilly, please assist    Patient calling stating her last refill of tramadol was 9/8/21, did verify that this is reflected in our chart as well. Patient asking if tramadol can be sent so she can refill it tomorrow.

## 2021-10-21 ENCOUNTER — HOSPITAL ENCOUNTER (OUTPATIENT)
Dept: MAMMOGRAPHY | Age: 41
Discharge: HOME | End: 2021-10-21
Attending: PHYSICIAN ASSISTANT
Payer: COMMERCIAL

## 2021-10-21 ENCOUNTER — LAB ENCOUNTER (OUTPATIENT)
Dept: LAB | Age: 41
End: 2021-10-21
Attending: PHYSICIAN ASSISTANT
Payer: COMMERCIAL

## 2021-10-21 DIAGNOSIS — E55.9 VITAMIN D DEFICIENCY: ICD-10-CM

## 2021-10-21 DIAGNOSIS — Z12.31 ENCOUNTER FOR SCREENING MAMMOGRAM FOR BREAST CANCER: ICD-10-CM

## 2021-10-21 DIAGNOSIS — Z00.00 ROUTINE GENERAL MEDICAL EXAMINATION AT A HEALTH CARE FACILITY: ICD-10-CM

## 2021-10-21 PROCEDURE — 77067 SCR MAMMO BI INCL CAD: CPT | Performed by: PHYSICIAN ASSISTANT

## 2021-10-21 PROCEDURE — 82306 VITAMIN D 25 HYDROXY: CPT

## 2021-10-21 PROCEDURE — 77063 BREAST TOMOSYNTHESIS BI: CPT | Performed by: PHYSICIAN ASSISTANT

## 2021-10-21 PROCEDURE — 80061 LIPID PANEL: CPT

## 2021-10-21 PROCEDURE — 36415 COLL VENOUS BLD VENIPUNCTURE: CPT

## 2021-11-02 ENCOUNTER — OFFICE VISIT (OUTPATIENT)
Dept: FAMILY MEDICINE CLINIC | Facility: CLINIC | Age: 41
End: 2021-11-02

## 2021-11-02 VITALS
BODY MASS INDEX: 34.87 KG/M2 | HEART RATE: 78 BPM | SYSTOLIC BLOOD PRESSURE: 130 MMHG | HEIGHT: 66 IN | DIASTOLIC BLOOD PRESSURE: 81 MMHG | WEIGHT: 217 LBS

## 2021-11-02 DIAGNOSIS — I10 ESSENTIAL HYPERTENSION: Primary | ICD-10-CM

## 2021-11-02 DIAGNOSIS — G89.29 CHRONIC PAIN OF RIGHT KNEE: ICD-10-CM

## 2021-11-02 DIAGNOSIS — M25.561 CHRONIC PAIN OF RIGHT KNEE: ICD-10-CM

## 2021-11-02 PROCEDURE — 99213 OFFICE O/P EST LOW 20 MIN: CPT | Performed by: PHYSICIAN ASSISTANT

## 2021-11-02 PROCEDURE — 3008F BODY MASS INDEX DOCD: CPT | Performed by: PHYSICIAN ASSISTANT

## 2021-11-02 PROCEDURE — 3075F SYST BP GE 130 - 139MM HG: CPT | Performed by: PHYSICIAN ASSISTANT

## 2021-11-02 PROCEDURE — 3079F DIAST BP 80-89 MM HG: CPT | Performed by: PHYSICIAN ASSISTANT

## 2021-11-02 NOTE — PROGRESS NOTES
HPI:     HPI  42year-old female is here in the office for follow up HTN and complains of chronic knee pain. Patient takes Tramadol 50 mg QID. Patient just finished PT without improvement.   Patient denies of chest pain, SOB, N/V/C/D, fever, dizziness, sync Highest education level: Not on file    Occupational History      Not on file    Tobacco Use      Smoking status: Current Some Day Smoker        Packs/day: 0.15        Years: 24.00        Pack years: 3.6      Smokeless tobacco: Never Used    Vaping Use postnasal drip, rhinorrhea, sinus pressure, sinus pain and sore throat. Respiratory: Negative for cough, chest tightness, shortness of breath and wheezing. Cardiovascular: Negative for chest pain and palpitations.    Gastrointestinal: Negative for abd questions or concerns.

## 2021-11-06 ENCOUNTER — PATIENT MESSAGE (OUTPATIENT)
Dept: FAMILY MEDICINE CLINIC | Facility: CLINIC | Age: 41
End: 2021-11-06

## 2021-11-06 NOTE — TELEPHONE ENCOUNTER
Please review; protocol failed/no protocol    Requested Prescriptions   Pending Prescriptions Disp Refills    traMADol 50 MG Oral Tab 120 tablet 0     Sig: Take 1 tablet (50 mg total) by mouth every 6 (six) hours as needed for Pain.         There is no refi

## 2021-11-07 RX ORDER — TRAMADOL HYDROCHLORIDE 50 MG/1
50 TABLET ORAL EVERY 6 HOURS PRN
Qty: 120 TABLET | Refills: 0 | Status: SHIPPED | OUTPATIENT
Start: 2021-11-07 | End: 2021-12-05

## 2021-11-07 NOTE — TELEPHONE ENCOUNTER
From: Marilu Bernal  To:  Mike Mary PA-C  Sent: 11/6/2021 1:24 PM CDT  Subject: Refill     Hi Dr. Willie Araiza  I send in a refill request today for the tramadol this morning and I haven't gotten a response like how you asked me to send the request for ref

## 2021-11-26 ENCOUNTER — TELEPHONE (OUTPATIENT)
Dept: FAMILY MEDICINE CLINIC | Facility: CLINIC | Age: 41
End: 2021-11-26

## 2021-11-26 DIAGNOSIS — G89.29 CHRONIC PAIN OF RIGHT KNEE: Primary | ICD-10-CM

## 2021-11-26 DIAGNOSIS — M25.561 CHRONIC PAIN OF RIGHT KNEE: Primary | ICD-10-CM

## 2021-11-26 NOTE — TELEPHONE ENCOUNTER
[facility] that requires a referral, thank you.

## 2021-12-05 RX ORDER — TRAMADOL HYDROCHLORIDE 50 MG/1
50 TABLET ORAL EVERY 6 HOURS PRN
Qty: 120 TABLET | Refills: 0 | Status: SHIPPED | OUTPATIENT
Start: 2021-12-05 | End: 2022-01-04

## 2021-12-06 ENCOUNTER — OFFICE VISIT (OUTPATIENT)
Dept: ORTHOPEDICS CLINIC | Facility: CLINIC | Age: 41
End: 2021-12-06

## 2021-12-06 VITALS — BODY MASS INDEX: 30.53 KG/M2 | HEIGHT: 66 IN | WEIGHT: 190 LBS

## 2021-12-06 DIAGNOSIS — M25.561 CHRONIC PAIN OF RIGHT KNEE: ICD-10-CM

## 2021-12-06 DIAGNOSIS — G89.29 CHRONIC PAIN OF RIGHT KNEE: ICD-10-CM

## 2021-12-06 DIAGNOSIS — M76.51 PATELLAR TENDINITIS OF RIGHT KNEE: ICD-10-CM

## 2021-12-06 DIAGNOSIS — E66.09 CLASS 1 OBESITY DUE TO EXCESS CALORIES WITH SERIOUS COMORBIDITY AND BODY MASS INDEX (BMI) OF 30.0 TO 30.9 IN ADULT: ICD-10-CM

## 2021-12-06 DIAGNOSIS — M23.203 DEGENERATIVE TEAR OF MEDIAL MENISCUS OF RIGHT KNEE: Primary | ICD-10-CM

## 2021-12-06 PROCEDURE — 99213 OFFICE O/P EST LOW 20 MIN: CPT | Performed by: ORTHOPAEDIC SURGERY

## 2021-12-06 PROCEDURE — 3008F BODY MASS INDEX DOCD: CPT | Performed by: ORTHOPAEDIC SURGERY

## 2021-12-06 NOTE — H&P
NURSING INTAKE COMMENTS: Patient presents with:  Knee Pain: right knee follow up scope in 2015, s/p PT      HPI: This 42year old female presents today with persistent right knee pain since our last visit in April 2021.   Recall in the past she had arthrosc Years: 24.00        Pack years: 3.6      Smokeless tobacco: Never Used    Vaping Use      Vaping Use: Never used    Substance and Sexual Activity      Alcohol use: Yes      Drug use: Never      Sexual activity: Not on file       Review of Systems:  GENER change. No results found.      Lab Results   Component Value Date    WBC 9.8 03/05/2021    HGB 13.6 03/05/2021    .0 03/05/2021      Lab Results   Component Value Date     (H) 03/05/2021    BUN 10 03/05/2021    CREATSERUM 0.70 03/05/2021

## 2021-12-14 ENCOUNTER — TELEPHONE (OUTPATIENT)
Dept: FAMILY MEDICINE CLINIC | Facility: CLINIC | Age: 41
End: 2021-12-14

## 2021-12-14 NOTE — TELEPHONE ENCOUNTER
Patient is requesting a copy of immunization records, please send to [facility]. Please call with any questions.

## 2022-01-04 RX ORDER — TRAMADOL HYDROCHLORIDE 50 MG/1
50 TABLET ORAL EVERY 6 HOURS PRN
Qty: 120 TABLET | Refills: 0 | Status: SHIPPED | OUTPATIENT
Start: 2022-01-04 | End: 2022-02-03

## 2022-01-05 NOTE — TELEPHONE ENCOUNTER
Please review. Protocol failed or does not have a protocol. Requested Prescriptions   Pending Prescriptions Disp Refills    traMADol 50 MG Oral Tab 120 tablet 0     Sig: Take 1 tablet (50 mg total) by mouth every 6 (six) hours as needed for Pain.

## 2022-02-02 ENCOUNTER — TELEPHONE (OUTPATIENT)
Dept: ORTHOPEDICS CLINIC | Facility: CLINIC | Age: 42
End: 2022-02-02

## 2022-02-03 RX ORDER — LISINOPRIL 20 MG/1
20 TABLET ORAL DAILY
Qty: 90 TABLET | Refills: 1 | Status: SHIPPED | OUTPATIENT
Start: 2022-02-03 | End: 2022-05-04

## 2022-02-03 RX ORDER — TRAMADOL HYDROCHLORIDE 50 MG/1
50 TABLET ORAL EVERY 6 HOURS PRN
Qty: 120 TABLET | Refills: 0 | Status: SHIPPED | OUTPATIENT
Start: 2022-02-03 | End: 2022-03-02

## 2022-02-03 NOTE — TELEPHONE ENCOUNTER
Refill passed per [facility] protocol. Lisinopril refilled. Please advise on request or tramadol.

## 2022-02-21 ENCOUNTER — PATIENT MESSAGE (OUTPATIENT)
Dept: FAMILY MEDICINE CLINIC | Facility: CLINIC | Age: 42
End: 2022-02-21

## 2022-02-22 ENCOUNTER — OFFICE VISIT (OUTPATIENT)
Dept: FAMILY MEDICINE CLINIC | Facility: CLINIC | Age: 42
End: 2022-02-22

## 2022-02-22 VITALS
HEIGHT: 66 IN | BODY MASS INDEX: 35.68 KG/M2 | DIASTOLIC BLOOD PRESSURE: 82 MMHG | SYSTOLIC BLOOD PRESSURE: 138 MMHG | HEART RATE: 101 BPM | WEIGHT: 222 LBS

## 2022-02-22 DIAGNOSIS — G44.209 ACUTE NON INTRACTABLE TENSION-TYPE HEADACHE: Primary | ICD-10-CM

## 2022-02-22 PROCEDURE — 3075F SYST BP GE 130 - 139MM HG: CPT | Performed by: PHYSICIAN ASSISTANT

## 2022-02-22 PROCEDURE — 99213 OFFICE O/P EST LOW 20 MIN: CPT | Performed by: PHYSICIAN ASSISTANT

## 2022-02-22 PROCEDURE — 3008F BODY MASS INDEX DOCD: CPT | Performed by: PHYSICIAN ASSISTANT

## 2022-02-22 PROCEDURE — 3079F DIAST BP 80-89 MM HG: CPT | Performed by: PHYSICIAN ASSISTANT

## 2022-02-22 NOTE — TELEPHONE ENCOUNTER
Contacted Sathish Angeles--agrees with in office appt as scheduled for evaluation. Spoke with patient and relayed Sathish's message below--patient verbalizes understanding and agreement and will keep appt as scheduled. No further questions/concerns at this time.

## 2022-03-02 RX ORDER — TRAMADOL HYDROCHLORIDE 50 MG/1
50 TABLET ORAL EVERY 6 HOURS PRN
Qty: 120 TABLET | Refills: 0 | Status: SHIPPED | OUTPATIENT
Start: 2022-03-02 | End: 2022-03-30

## 2022-03-02 RX ORDER — IBUPROFEN 800 MG/1
800 TABLET ORAL EVERY 8 HOURS PRN
Qty: 90 TABLET | Refills: 1 | Status: SHIPPED | OUTPATIENT
Start: 2022-03-02

## 2022-03-03 NOTE — TELEPHONE ENCOUNTER
Protocol failed or has No Protocol, please review  Requested Prescriptions   Pending Prescriptions Disp Refills    ibuprofen 800 MG Oral Tab 90 tablet 1     Sig: Take 1 tablet (800 mg total) by mouth every 8 (eight) hours as needed.         There is no refill protocol information for this order           Recent Outpatient Visits              1 week ago Acute non intractable tension-type headache    [address] [facility] Dieudonne Gutierrez PA-C    Office Visit    2 months ago Degenerative tear of medial meniscus of right knee    [facility] for Ajith Ferreira MD    Office Visit    4 months ago Essential hypertension    [address] [facility] Dieudonne Gutierrez PA-C    Office Visit    6 months ago     [address] [place], [place]    Office Visit    7 months ago     [address] [place], [place]    Office Visit

## 2022-03-03 NOTE — TELEPHONE ENCOUNTER
Protocol failed or has No Protocol, please review  Last office visit = 2/22/22   Last refill = 2/3/22    Requested Prescriptions   Pending Prescriptions Disp Refills    traMADol 50 MG Oral Tab 120 tablet 0     Sig: Take 1 tablet (50 mg total) by mouth every 6 (six) hours as needed for Pain.         There is no refill protocol information for this order           Recent Outpatient Visits              1 week ago Acute non intractable tension-type headache    [address] [facility]    Office Visit    2 months ago Degenerative tear of medial meniscus of right knee    [facility] [place] [facility] for Tony Garcia MD    Office Visit    4 months ago Essential hypertension    [address] [facility], [facility]    Office Visit    6 months ago     [address] [place], [place]    Office Visit    7 months ago     [address] [place], [place]    Office Visit

## 2022-03-29 ENCOUNTER — PATIENT MESSAGE (OUTPATIENT)
Dept: FAMILY MEDICINE CLINIC | Facility: CLINIC | Age: 42
End: 2022-03-29

## 2022-03-30 RX ORDER — TRAMADOL HYDROCHLORIDE 50 MG/1
50 TABLET ORAL EVERY 6 HOURS PRN
Qty: 120 TABLET | Refills: 0 | Status: SHIPPED | OUTPATIENT
Start: 2022-03-30

## 2022-03-30 NOTE — TELEPHONE ENCOUNTER
From: Layton Sandifer  To: Shari Cruz PA-C  Sent: 3/29/2022 11:48 PM CDT  Subject: Refill     Hi Dr. Neris Matrins. Is there anyway you can refill my tramadol prescription on Friday morning. I'll be out of town for 2 weeks due to family emergency. I have a few left still but I prefer to be on the safe side.      Thank Shruthi Boland

## 2022-03-30 NOTE — TELEPHONE ENCOUNTER
Last office visit = 2/22/22   Last refill = 3/2/22    Med has no protocol     Med pended for your review / approval

## 2022-04-27 RX ORDER — TRAMADOL HYDROCHLORIDE 50 MG/1
50 TABLET ORAL EVERY 6 HOURS PRN
Qty: 120 TABLET | Refills: 0 | Status: SHIPPED | OUTPATIENT
Start: 2022-04-27

## 2022-05-19 RX ORDER — TRAMADOL HYDROCHLORIDE 50 MG/1
50 TABLET ORAL EVERY 6 HOURS PRN
Qty: 120 TABLET | Refills: 0 | OUTPATIENT
Start: 2022-05-19

## 2022-05-19 NOTE — TELEPHONE ENCOUNTER
Please review. Protocol failed or has no protocol.     Requested Prescriptions   Pending Prescriptions Disp Refills    TRAMADOL 50 MG Oral Tab [Pharmacy Med Name: Tramadol Hcl 50 Mg Tab Sunp] 120 tablet 0     Sig: TAKE 1 TABLET BY MOUTH EVERY 6 HOURS AS NEEDED        There is no refill protocol information for this order           Recent Outpatient Visits              2 months ago Acute non intractable tension-type headache    [address] [facility] Rosario Clark PA-C    Office Visit    5 months ago Degenerative tear of medial meniscus of right knee    [facility] [place] [facility] for Zak Stephens MD    Office Visit    6 months ago Essential hypertension    [address] [facility] Rosario Clark PA-C    Office Visit    9 months ago     [address] [place], [place]    Office Visit    9 months ago     [address] [place], [place]    Office Visit

## 2022-05-23 RX ORDER — TRAMADOL HYDROCHLORIDE 50 MG/1
50 TABLET ORAL EVERY 6 HOURS PRN
Qty: 120 TABLET | Refills: 0 | Status: SHIPPED | OUTPATIENT
Start: 2022-05-23

## 2022-06-01 ENCOUNTER — TELEPHONE (OUTPATIENT)
Dept: FAMILY MEDICINE CLINIC | Facility: CLINIC | Age: 42
End: 2022-06-01

## 2022-06-01 NOTE — TELEPHONE ENCOUNTER
Patient is requesting a recommendation for a cardiologist. Patient was instructed to follow up with a cardiologist by PETRONA Ochoa during her last appointment. Please advise.

## 2022-06-04 NOTE — TELEPHONE ENCOUNTER
Spoke with patient ( & Name verified). Advise patient that she needs to see Cardio when patient is ready for knee surgery. Patient verbalized understanding.

## 2022-06-17 ENCOUNTER — PATIENT MESSAGE (OUTPATIENT)
Dept: FAMILY MEDICINE CLINIC | Facility: CLINIC | Age: 42
End: 2022-06-17

## 2022-06-18 RX ORDER — TRAMADOL HYDROCHLORIDE 50 MG/1
50 TABLET ORAL EVERY 6 HOURS PRN
Qty: 120 TABLET | Refills: 0 | OUTPATIENT
Start: 2022-06-18

## 2022-06-18 RX ORDER — TRAMADOL HYDROCHLORIDE 50 MG/1
50 TABLET ORAL EVERY 6 HOURS PRN
Qty: 120 TABLET | Refills: 0 | Status: SHIPPED | OUTPATIENT
Start: 2022-06-18

## 2022-06-18 NOTE — TELEPHONE ENCOUNTER
Patient called to check up. Name and  verified. Patient is requesting medication to be refilled. States that she is going out of town on Monday at 1130am and will be gone for 3 weeks due to a family emergency. She is requesting to  at the pharmacy on Monday if possible. Patient states that she is taking the med 3 times daily. She is on her feet a lot and still having knee pain. Medication pended. Please advise. Thanks. The patient is 16 years of age.  The patient returned to the clinic because of   abnormal menstrual period.  The patient was diagnosed to be polycystic ovarian   syndrome, confirmed with multiple polycystic ovaries; however, the patient's   hormone assay essentially normal range, only a slight reversal FSH, LH ratio.    The patient was put on Roxane that had breakthrough bleeding on it and then I   will put her on Sprintec.  She has breakthrough bleeding on it also, so we have   difficulty controlling her abnormal bleeding.  The patient is, at the present   time, not sexually active.  We will put her on Megace 20 mg three times a day   for the next two months and then we will reevaluate the situation and discuss   with the patient this is not a birth control, it is just to control her   bleeding.  Also, continue with the vitamin one a day for women and we will see   her back in two months.      JIV/IN  dd: 11/07/2017 13:13:30 (CST)  td: 11/08/2017 07:32:28 (CST)  Doc ID   #[id number]  Job ID #[id number]    CC:

## 2022-06-18 NOTE — TELEPHONE ENCOUNTER
From: Carl Preciado  To: Isaac Moses PA-C  Sent: 6/17/2022 9:51 PM CDT  Subject: Refill request    Good Evening Dr. Reji Barnett will be going out of town Monday early morning for 2 weeks due to a family emergency. I would like to know if isidoro can refill my prescription the tramadol on Sunday.      Thank David Nolasco

## 2022-07-14 RX ORDER — TRAMADOL HYDROCHLORIDE 50 MG/1
50 TABLET ORAL EVERY 6 HOURS PRN
Qty: 120 TABLET | Refills: 0 | Status: SHIPPED | OUTPATIENT
Start: 2022-07-14

## 2022-07-14 RX ORDER — IBUPROFEN 800 MG/1
800 TABLET ORAL EVERY 8 HOURS PRN
Qty: 90 TABLET | Refills: 1 | Status: SHIPPED | OUTPATIENT
Start: 2022-07-14

## 2022-08-05 ENCOUNTER — HOSPITAL ENCOUNTER (OUTPATIENT)
Dept: MRI IMAGING | Age: 42
Discharge: HOME | End: 2022-08-05
Attending: ORTHOPAEDIC SURGERY
Payer: COMMERCIAL

## 2022-08-05 DIAGNOSIS — M25.561 CHRONIC PAIN OF RIGHT KNEE: ICD-10-CM

## 2022-08-05 DIAGNOSIS — M76.51 PATELLAR TENDINITIS OF RIGHT KNEE: ICD-10-CM

## 2022-08-05 DIAGNOSIS — M23.203 DEGENERATIVE TEAR OF MEDIAL MENISCUS OF RIGHT KNEE: ICD-10-CM

## 2022-08-05 DIAGNOSIS — G89.29 CHRONIC PAIN OF RIGHT KNEE: ICD-10-CM

## 2022-08-05 PROCEDURE — 73721 MRI JNT OF LWR EXTRE W/O DYE: CPT | Performed by: ORTHOPAEDIC SURGERY

## 2022-08-14 RX ORDER — TRAMADOL HYDROCHLORIDE 50 MG/1
50 TABLET ORAL EVERY 6 HOURS PRN
Qty: 120 TABLET | Refills: 0 | Status: SHIPPED | OUTPATIENT
Start: 2022-08-14

## 2022-08-15 RX ORDER — TRAMADOL HYDROCHLORIDE 50 MG/1
50 TABLET ORAL EVERY 6 HOURS PRN
Qty: 120 TABLET | Refills: 0 | OUTPATIENT
Start: 2022-08-15

## 2022-09-13 RX ORDER — TRAMADOL HYDROCHLORIDE 50 MG/1
50 TABLET ORAL EVERY 6 HOURS PRN
Qty: 120 TABLET | Refills: 0 | Status: SHIPPED | OUTPATIENT
Start: 2022-09-13

## 2022-09-13 NOTE — TELEPHONE ENCOUNTER
Please review. Protocol failed / No protocol. Requested Prescriptions   Pending Prescriptions Disp Refills    traMADol 50 MG Oral Tab 120 tablet 0     Sig: Take 1 tablet (50 mg total) by mouth every 6 (six) hours as needed for Pain.         There is no refill protocol information for this order             Recent Outpatient Visits              6 months ago Acute non intractable tension-type headache    1200 [facility] Gala Staley PA-C    Office Visit    9 months ago Degenerative tear of medial meniscus of right knee    [facility] [place] [facility] for Delia Bernard MD    Office Visit    10 months ago Essential hypertension    [address] [facility] Gala Staley PA-C    Office Visit    1 year ago     [address] [place], [place]    Office Visit    1 year ago     [address] [place], [place]    Office Visit

## 2022-10-13 RX ORDER — TRAMADOL HYDROCHLORIDE 50 MG/1
50 TABLET ORAL EVERY 6 HOURS PRN
Qty: 120 TABLET | Refills: 0 | OUTPATIENT
Start: 2022-10-13

## 2022-10-13 NOTE — TELEPHONE ENCOUNTER
See refill request from today as well. Patient needs Rx sent [facility] this one time only, not [place]. Rx pended for [facility].

## 2022-10-14 ENCOUNTER — NURSE TRIAGE (OUTPATIENT)
Dept: FAMILY MEDICINE CLINIC | Facility: CLINIC | Age: 42
End: 2022-10-14

## 2022-10-14 RX ORDER — TRAMADOL HYDROCHLORIDE 50 MG/1
50 TABLET ORAL EVERY 6 HOURS PRN
Qty: 120 TABLET | Refills: 0 | OUTPATIENT
Start: 2022-10-14

## 2022-10-14 NOTE — TELEPHONE ENCOUNTER
Spoke with pt and Min's message given. Pt verb understanding and agrees to plan.   Video Visit scheduled 10/18/22 for medication f/u

## 2022-10-18 ENCOUNTER — TELEMEDICINE (OUTPATIENT)
Dept: FAMILY MEDICINE CLINIC | Facility: CLINIC | Age: 42
End: 2022-10-18

## 2022-10-18 DIAGNOSIS — M25.561 CHRONIC PAIN OF RIGHT KNEE: Primary | ICD-10-CM

## 2022-10-18 DIAGNOSIS — G89.29 CHRONIC PAIN OF RIGHT KNEE: Primary | ICD-10-CM

## 2022-10-18 PROCEDURE — 99213 OFFICE O/P EST LOW 20 MIN: CPT | Performed by: PHYSICIAN ASSISTANT

## 2022-10-18 RX ORDER — TRAMADOL HYDROCHLORIDE 50 MG/1
50 TABLET ORAL EVERY 6 HOURS PRN
Qty: 120 TABLET | Refills: 0 | Status: SHIPPED | OUTPATIENT
Start: 2022-10-18

## 2022-11-16 DIAGNOSIS — G89.29 CHRONIC PAIN OF RIGHT KNEE: ICD-10-CM

## 2022-11-16 DIAGNOSIS — M25.561 CHRONIC PAIN OF RIGHT KNEE: ICD-10-CM

## 2022-11-17 RX ORDER — TRAMADOL HYDROCHLORIDE 50 MG/1
50 TABLET ORAL EVERY 6 HOURS PRN
Qty: 120 TABLET | Refills: 0 | Status: SHIPPED | OUTPATIENT
Start: 2022-11-17

## 2022-11-17 NOTE — TELEPHONE ENCOUNTER
Please review. Protocol failed or has no protocol. Requested Prescriptions   Pending Prescriptions Disp Refills    traMADol 50 MG Oral Tab 120 tablet 0     Sig: Take 1 tablet (50 mg total) by mouth every 6 (six) hours as needed for Pain.        There is no refill protocol information for this order          Recent Outpatient Visits              1 month ago Chronic pain of right knee    [address] [facility] Ganesh Carter PA-C    Telemedicine    8 months ago Acute non intractable tension-type headache    [address] [facility] Ganesh Carter PA-C    Office Visit    11 months ago Degenerative tear of medial meniscus of right knee    [facility] [place] [facility]    Office Visit    1 year ago Essential hypertension    [address] [facility] Ganesh Carter PA-C    Office Visit    1 year ago     [address] [facility], [address] [facility]    Office Visit            Future Appointments         Provider Department Appt Notes    In 1 month Bret Estes MD [facility], [address] [facility] Follow up on right knee

## 2022-12-10 ENCOUNTER — PATIENT MESSAGE (OUTPATIENT)
Dept: FAMILY MEDICINE CLINIC | Facility: CLINIC | Age: 42
End: 2022-12-10

## 2022-12-10 DIAGNOSIS — G89.29 CHRONIC PAIN OF RIGHT KNEE: ICD-10-CM

## 2022-12-10 DIAGNOSIS — M25.561 CHRONIC PAIN OF RIGHT KNEE: ICD-10-CM

## 2022-12-11 RX ORDER — TRAMADOL HYDROCHLORIDE 50 MG/1
50 TABLET ORAL EVERY 6 HOURS PRN
Qty: 120 TABLET | Refills: 0
Start: 2022-12-11

## 2022-12-11 NOTE — TELEPHONE ENCOUNTER
Valeria Morataya, patient requesting early refill. Last RX was 11/17/22 #120. From: Micah Austin  To: El Mosqueda PA-C  Sent: 12/10/2022  3:43 PM CST  Subject: Refill     Hi Dr. Madeline Duke will be going out of town this Wednesday morning. I will be going to [facility] for the holiday. Is there any possible way you can refill my prescription on Tuesday afternoon for the Tramadol.

## 2022-12-12 RX ORDER — TRAMADOL HYDROCHLORIDE 50 MG/1
50 TABLET ORAL EVERY 6 HOURS PRN
Qty: 120 TABLET | Refills: 0 | Status: SHIPPED | OUTPATIENT
Start: 2022-12-12

## 2022-12-12 NOTE — TELEPHONE ENCOUNTER
Bill back in office tomorrow, per schedule. Dr. Cathy Olvera - Tramadol Refill request. Pt clarifies that she has an appt with Dr. Annelise Vizcaino 1/17. But needs pain management refill until she can be seen. Bill RUSS denied early refill, but patient asking if this can be a temporary exception. Referral for Pain Management also pended. Patient called office. Patient's date of birth and full name both confirmed. RN informed of provider's message as detailed below. She verbalizes understanding. But patient says that [facility] and patient discussed this - and that [facility] PA told her she would prescribe medication until she is able to see Dr. Ryan Jorge. Pt clarifies that she has an appt with Dr. Annelise Vizcaino 1/17. But needs pain management refill until she can be seen. RN also provided number for Pain Management. RN also informed patient to seek immediate medical attention at ER if patient experiences severe/worsening symptoms, such as severe pain. Patient verbalizes understanding and is agreeable to instructions.          Future Appointments   Date Time Provider Ricco Patel   1/17/2023  3:50 PM Tierney Suarez MD THE [facility]

## 2022-12-12 NOTE — TELEPHONE ENCOUNTER
Declined early refill. Please advise patient to follow up with Dr Randa Reyes for further treatment. May refer to Pain Management if the pain persists so long. Please advise patient to wean off the Tramadol.

## 2022-12-12 NOTE — TELEPHONE ENCOUNTER
Patient calling about request, states she rescheduled for the soonest available appt with Dr. Marquez Ra = 1/10/22    She is leaving for [facility] Wednesday 12/14/22, they are driving. They will be back 1/3/23.       Future Appointments   Date Time Provider Ricco Patel   1/10/2023  1:10 PM Sandoval Flor MD THE [facility]

## 2022-12-13 NOTE — TELEPHONE ENCOUNTER
Nayla Odonnell pharmacist indicated that patient is going out of town tomorrow and wanted to know if ok to refill the tramadol early. Advised pharmacist that ok per Dr Robles Bang note below. Pharmacist verbalized understanding.

## 2023-01-10 ENCOUNTER — OFFICE VISIT (OUTPATIENT)
Dept: ORTHOPEDICS CLINIC | Facility: CLINIC | Age: 43
End: 2023-01-10
Payer: COMMERCIAL

## 2023-01-10 ENCOUNTER — HOSPITAL ENCOUNTER (OUTPATIENT)
Dept: GENERAL RADIOLOGY | Facility: HOSPITAL | Age: 43
Discharge: HOME | End: 2023-01-10
Attending: ORTHOPAEDIC SURGERY
Payer: COMMERCIAL

## 2023-01-10 DIAGNOSIS — R52 PAIN: ICD-10-CM

## 2023-01-10 DIAGNOSIS — M25.561 CHRONIC PAIN OF RIGHT KNEE: ICD-10-CM

## 2023-01-10 DIAGNOSIS — G89.29 CHRONIC PAIN OF RIGHT KNEE: ICD-10-CM

## 2023-01-10 DIAGNOSIS — M23.203 DEGENERATIVE TEAR OF MEDIAL MENISCUS OF RIGHT KNEE: Primary | ICD-10-CM

## 2023-01-10 DIAGNOSIS — E66.01 CLASS 2 SEVERE OBESITY DUE TO EXCESS CALORIES WITH SERIOUS COMORBIDITY AND BODY MASS INDEX (BMI) OF 35.0 TO 35.9 IN ADULT (HCC): ICD-10-CM

## 2023-01-10 PROCEDURE — 99213 OFFICE O/P EST LOW 20 MIN: CPT | Performed by: ORTHOPAEDIC SURGERY

## 2023-01-10 PROCEDURE — 73562 X-RAY EXAM OF KNEE 3: CPT | Performed by: ORTHOPAEDIC SURGERY

## 2023-01-20 ENCOUNTER — TELEPHONE (OUTPATIENT)
Dept: ORTHOPEDICS CLINIC | Facility: CLINIC | Age: 43
End: 2023-01-20

## 2023-01-21 DIAGNOSIS — M25.561 CHRONIC PAIN OF RIGHT KNEE: ICD-10-CM

## 2023-01-21 DIAGNOSIS — G89.29 CHRONIC PAIN OF RIGHT KNEE: ICD-10-CM

## 2023-01-23 ENCOUNTER — TELEPHONE (OUTPATIENT)
Dept: FAMILY MEDICINE CLINIC | Facility: CLINIC | Age: 43
End: 2023-01-23

## 2023-01-23 RX ORDER — TRAMADOL HYDROCHLORIDE 50 MG/1
TABLET ORAL
Qty: 120 TABLET | Refills: 0 | Status: SHIPPED | OUTPATIENT
Start: 2023-01-23

## 2023-01-23 NOTE — TELEPHONE ENCOUNTER
Verified name and . Patient calling to ask if office received fax from Dr. Danyelle Duffy's office in regards to upcoming surgery. Office staff, please assist and thank you. Patient would like call with update.

## 2023-01-23 NOTE — TELEPHONE ENCOUNTER
Spoke with Liyah Rodriguez to offer surgery dates. She stated that she will be starting a new job on Monday and her insurance won't start until 90 days after her start date. She will call us back to schedule her surgery once her new insurance starts.

## 2023-01-23 NOTE — TELEPHONE ENCOUNTER
Patient calling to follow up on refill request.    Prescription originally prescribed by Francesco Farrell- she is out of office. Routing to Dr. Peg Bermudez (PCP). Patient states she is out of medication now.

## 2023-02-19 DIAGNOSIS — M25.561 CHRONIC PAIN OF RIGHT KNEE: ICD-10-CM

## 2023-02-19 DIAGNOSIS — G89.29 CHRONIC PAIN OF RIGHT KNEE: ICD-10-CM

## 2023-02-20 RX ORDER — TRAMADOL HYDROCHLORIDE 50 MG/1
50 TABLET ORAL EVERY 6 HOURS PRN
Qty: 120 TABLET | Refills: 0 | Status: SHIPPED | OUTPATIENT
Start: 2023-02-20

## 2023-03-16 DIAGNOSIS — G89.29 CHRONIC PAIN OF RIGHT KNEE: ICD-10-CM

## 2023-03-16 DIAGNOSIS — M25.561 CHRONIC PAIN OF RIGHT KNEE: ICD-10-CM

## 2023-03-16 RX ORDER — TRAMADOL HYDROCHLORIDE 50 MG/1
50 TABLET ORAL EVERY 6 HOURS PRN
Qty: 120 TABLET | Refills: 0 | Status: SHIPPED | OUTPATIENT
Start: 2023-03-16

## 2023-04-13 DIAGNOSIS — M25.561 CHRONIC PAIN OF RIGHT KNEE: ICD-10-CM

## 2023-04-13 DIAGNOSIS — G89.29 CHRONIC PAIN OF RIGHT KNEE: ICD-10-CM

## 2023-04-14 DIAGNOSIS — M25.561 CHRONIC PAIN OF RIGHT KNEE: ICD-10-CM

## 2023-04-14 DIAGNOSIS — G89.29 CHRONIC PAIN OF RIGHT KNEE: ICD-10-CM

## 2023-04-14 RX ORDER — TRAMADOL HYDROCHLORIDE 50 MG/1
50 TABLET ORAL EVERY 6 HOURS PRN
Qty: 120 TABLET | Refills: 0 | Status: SHIPPED | OUTPATIENT
Start: 2023-04-14

## 2023-04-14 RX ORDER — TRAMADOL HYDROCHLORIDE 50 MG/1
50 TABLET ORAL EVERY 6 HOURS PRN
Qty: 120 TABLET | Refills: 0 | OUTPATIENT
Start: 2023-04-14

## 2023-04-14 NOTE — TELEPHONE ENCOUNTER
Please Review. Protocol failed or has no protocol  Requested Prescriptions   Pending Prescriptions Disp Refills    traMADol 50 MG Oral Tab 120 tablet 0     Sig: Take 1 tablet (50 mg total) by mouth every 6 (six) hours as needed for Pain.        There is no refill protocol information for this order         Recent Outpatient Visits              3 months ago Degenerative tear of medial meniscus of right knee    [address] [facility], Garfield Leon MD    Office Visit    5 months ago Chronic pain of right knee    [facility], [place] Dipika Mujica PA-C    Telemedicine    1 year ago Acute non intractable tension-type headache    [facility] [place] [facility] [employer]    Office Visit    1 year ago Degenerative tear of medial meniscus of right knee    [address] [facility]    Office Visit    1 year ago Essential hypertension    [address], [address] [facility], [place] Darcus Fearing, [employer]    Office Visit

## 2023-05-15 DIAGNOSIS — M25.561 CHRONIC PAIN OF RIGHT KNEE: ICD-10-CM

## 2023-05-15 DIAGNOSIS — G89.29 CHRONIC PAIN OF RIGHT KNEE: ICD-10-CM

## 2023-05-15 RX ORDER — TRAMADOL HYDROCHLORIDE 50 MG/1
50 TABLET ORAL EVERY 6 HOURS PRN
Qty: 120 TABLET | Refills: 0 | OUTPATIENT
Start: 2023-05-15

## 2023-05-15 RX ORDER — TRAMADOL HYDROCHLORIDE 50 MG/1
50 TABLET ORAL EVERY 6 HOURS PRN
Qty: 120 TABLET | Refills: 0 | Status: CANCELLED | OUTPATIENT
Start: 2023-05-15

## 2023-05-15 NOTE — TELEPHONE ENCOUNTER
Please review. Protocol failed / No protocol. Requested Prescriptions   Pending Prescriptions Disp Refills    traMADol 50 MG Oral Tab 120 tablet 0     Sig: Take 1 tablet (50 mg total) by mouth every 6 (six) hours as needed for Pain.        There is no refill protocol information for this order           Recent Outpatient Visits              4 months ago Degenerative tear of medial meniscus of right knee    [address] [facility]    Office Visit    6 months ago Chronic pain of right knee    [facility], [address], [place] Sonna MARGI Larsen    Telemedicine    1 year ago Acute non intractable tension-type headache    [facility], [address], [place], [place]    Office Visit    1 year ago Degenerative tear of medial meniscus of right knee    [address] [facility]    Office Visit    1 year ago Essential hypertension    [address], [address], [place], [place]    Office Visit

## 2023-05-16 NOTE — TELEPHONE ENCOUNTER
Spoke with pt,  verified, pt was informed of Min PA response. Pt supposed to have knee surgery with Dr Peter Hernandez but she has no insurance. Pt just started a new job and she missed the enrollment for the new insurance. Pt will need to wait unti November to apply for new insurance. Pt will try to set up appt with pain mngt and see if she can do payment plan for ov. No future appointments.

## 2023-05-17 ENCOUNTER — TELEPHONE (OUTPATIENT)
Dept: FAMILY MEDICINE CLINIC | Facility: CLINIC | Age: 43
End: 2023-05-17

## 2023-05-17 DIAGNOSIS — G89.29 CHRONIC PAIN OF RIGHT KNEE: ICD-10-CM

## 2023-05-17 DIAGNOSIS — M25.561 CHRONIC PAIN OF RIGHT KNEE: ICD-10-CM

## 2023-05-17 RX ORDER — TRAMADOL HYDROCHLORIDE 50 MG/1
50 TABLET ORAL EVERY 6 HOURS PRN
Qty: 120 TABLET | Refills: 0 | Status: SHIPPED | OUTPATIENT
Start: 2023-05-17

## 2023-05-17 NOTE — TELEPHONE ENCOUNTER
Patient calling (identified name and ) regarding the Tramadol refill denial. Patient states she just started a new job and cannot enroll for insurance benefits until November. She had called pain management yesterday and they will not schedule her without coverage. Patient states her knee surgery is also on hold due to her lack of benefits and asking if Adan Larsen would be able to prescribe the Tramadol until she is able to see pain management. Please advise.

## 2023-06-19 DIAGNOSIS — G89.29 CHRONIC PAIN OF RIGHT KNEE: ICD-10-CM

## 2023-06-19 DIAGNOSIS — M25.561 CHRONIC PAIN OF RIGHT KNEE: ICD-10-CM

## 2023-06-19 NOTE — TELEPHONE ENCOUNTER
Please Review. Protocol Failed or has no protocol. Requested Prescriptions   Pending Prescriptions Disp Refills    traMADol 50 MG Oral Tab 120 tablet 0     Sig: Take 1 tablet (50 mg total) by mouth every 6 (six) hours as needed for Pain.        There is no refill protocol information for this order        Recent Outpatient Visits              5 months ago Degenerative tear of medial meniscus of right knee    Davion Jones MD    Office Visit    8 months ago Chronic pain of right knee    [facility] [place] [facility]    Telemedicine    1 year ago Acute non intractable tension-type headache    [facility], [place] Elizabethann Chi, [employer]    Office Visit    1 year ago Degenerative tear of medial meniscus of right knee    Davion Jones MD    Office Visit    1 year ago Essential hypertension    [address] [facility], [place] Elizabethann Chi, [employer]    Office Visit

## 2023-06-20 DIAGNOSIS — M25.561 CHRONIC PAIN OF RIGHT KNEE: ICD-10-CM

## 2023-06-20 DIAGNOSIS — G89.29 CHRONIC PAIN OF RIGHT KNEE: ICD-10-CM

## 2023-06-20 RX ORDER — TRAMADOL HYDROCHLORIDE 50 MG/1
50 TABLET ORAL EVERY 6 HOURS PRN
Qty: 120 TABLET | Refills: 0 | Status: SHIPPED | OUTPATIENT
Start: 2023-06-20

## 2023-06-20 RX ORDER — TRAMADOL HYDROCHLORIDE 50 MG/1
50 TABLET ORAL EVERY 6 HOURS PRN
Qty: 120 TABLET | Refills: 0 | OUTPATIENT
Start: 2023-06-20

## 2023-07-14 DIAGNOSIS — G89.29 CHRONIC PAIN OF RIGHT KNEE: ICD-10-CM

## 2023-07-14 DIAGNOSIS — M25.561 CHRONIC PAIN OF RIGHT KNEE: ICD-10-CM

## 2023-07-15 DIAGNOSIS — G89.29 CHRONIC PAIN OF RIGHT KNEE: ICD-10-CM

## 2023-07-15 DIAGNOSIS — M25.561 CHRONIC PAIN OF RIGHT KNEE: ICD-10-CM

## 2023-07-15 NOTE — TELEPHONE ENCOUNTER
Please review. Protocol failed / No protocol. Requested Prescriptions   Pending Prescriptions Disp Refills    TRAMADOL 50 MG Oral Tab [Pharmacy Med Name: Tramadol Hydrochloride 50 Mg Tab Unic] 120 tablet 0     Sig: Take 1 tablet (50 mg total) by mouth every 6 (six) hours as needed for Pain.        There is no refill protocol information for this order              Recent Outpatient Visits              6 months ago Degenerative tear of medial meniscus of right knee    [address] [facility]    Office Visit    9 months ago Chronic pain of right knee    [facility], [address], [place] Adelina Redmond PA-C    Telemedicine    1 year ago Acute non intractable tension-type headache    [facility], [address], [place] Adelina Halo, [employer]    Office Visit    1 year ago Degenerative tear of medial meniscus of right knee    [address] [facility]    Office Visit    1 year ago Essential hypertension    [address], [address], [place] Adelina Redmond, [employer]    Office Visit

## 2023-07-16 RX ORDER — TRAMADOL HYDROCHLORIDE 50 MG/1
50 TABLET ORAL EVERY 6 HOURS PRN
Qty: 120 TABLET | Refills: 0 | OUTPATIENT
Start: 2023-07-16

## 2023-07-17 RX ORDER — TRAMADOL HYDROCHLORIDE 50 MG/1
50 TABLET ORAL EVERY 6 HOURS PRN
Qty: 120 TABLET | Refills: 0 | Status: SHIPPED | OUTPATIENT
Start: 2023-07-17

## 2023-07-17 NOTE — TELEPHONE ENCOUNTER
Pt called back, stated she do not have INS and won't have till November , Pain management will not see her w/o INS, Dr Bushra Solano will do surgery once she have INS willing to pay out of pocket , stated you are aware she has no INS, asking if you will call her

## 2023-07-23 NOTE — TELEPHONE ENCOUNTER
Lmtcb, appt needed with Min PA-C Pt alert and oriented. VSS. NSR on tele. SpO2 mid 90s on RA. Afebrile. No c/o pain. Anxiety treated with scheduled atarax and prn xanax. Pt found in bathroom unsteady with IV ripped out; see other progress note for more detail. Pt receiving IVF and hep gtt; maintained per protocol. Pt will d/c home once medically stable. Bed alarm on, call light within reach. Will monitor.        Problem: Discharge Planning  Goal: Discharge to home or other facility with appropriate resources  7/23/2023 0629 by Christopher Burnett RN  Outcome: Progressing  7/23/2023 0433 by Radha Arnold RN  Outcome: Progressing  Flowsheets (Taken 7/21/2023 2300 by Krista Harris RN)  Discharge to home or other facility with appropriate resources:   Identify barriers to discharge with patient and caregiver   Arrange for needed discharge resources and transportation as appropriate   Identify discharge learning needs (meds, wound care, etc)  7/22/2023 1824 [facility]  Outcome: Progressing     Problem: Safety - Adult  Goal: Free from fall injury  7/23/2023 0629 by Christopher Burnett RN  Outcome: Progressing  7/23/2023 0433 by Radha Arnold RN  Outcome: Progressing  Flowsheets (Taken 7/23/2023 0433)  Free From Fall Injury:   Instruct family/caregiver on patient safety   Based on caregiver fall risk screen, instruct family/caregiver to ask for assistance with transferring infant if caregiver noted to have fall risk factors  7/22/2023 1824 by [facility]  Outcome: Progressing     Problem: Respiratory - Adult  Goal: Achieves optimal ventilation and oxygenation  Outcome: Progressing

## 2023-08-13 DIAGNOSIS — M25.561 CHRONIC PAIN OF RIGHT KNEE: ICD-10-CM

## 2023-08-13 DIAGNOSIS — G89.29 CHRONIC PAIN OF RIGHT KNEE: ICD-10-CM

## 2023-08-14 RX ORDER — TRAMADOL HYDROCHLORIDE 50 MG/1
50 TABLET ORAL EVERY 6 HOURS PRN
Qty: 120 TABLET | Refills: 0 | Status: SHIPPED | OUTPATIENT
Start: 2023-08-14

## 2023-08-14 NOTE — TELEPHONE ENCOUNTER
Tried calling patient, unable to leave vm    Please help patient schedule Video visit, ok per Harman Shi.

## 2023-08-14 NOTE — TELEPHONE ENCOUNTER
Bill RUSS ---- Tramadol refill request and asking if a Virtual Visit would suffice if it is cheaper, so that she can continue to get pain medication. Patient called office. Date of birth and full name both confirmed. Following up, needs refill for tramadol. Advised patient, will be reviewed and processed by our team within 48-72 business hours. Patient verbalizes understanding of all information, and agreeable to plan. Also asking if a virtual visit would be sufficient. No insurance yet. Insurance goes into effect in November   Advised there will still be a charge for the virtual visit. Warm transferred to appointment desk to discuss cost of in-person and virtual visit.

## 2023-09-15 DIAGNOSIS — M25.561 CHRONIC PAIN OF RIGHT KNEE: ICD-10-CM

## 2023-09-15 DIAGNOSIS — G89.29 CHRONIC PAIN OF RIGHT KNEE: ICD-10-CM

## 2023-09-15 RX ORDER — TRAMADOL HYDROCHLORIDE 50 MG/1
50 TABLET ORAL EVERY 6 HOURS PRN
Qty: 120 TABLET | Refills: 0 | Status: SHIPPED | OUTPATIENT
Start: 2023-09-15 | End: 2023-10-10

## 2023-09-15 NOTE — TELEPHONE ENCOUNTER
Please review; protocol failed. Or has no protocol    Requested Prescriptions   Pending Prescriptions Disp Refills    traMADol 50 MG Oral Tab 120 tablet 0     Sig: Take 1 tablet (50 mg total) by mouth every 6 (six) hours as needed for Pain.        There is no refill protocol information for this order           Recent Outpatient Visits              8 months ago Degenerative tear of medial meniscus of right knee    Yoselyn Schaefer MD    Office Visit    11 months ago Chronic pain of right knee    [address],[place] [address], [address], [place] Leim SimsMARGI    Telemedicine    1 year ago Acute non intractable tension-type headache    [facility], [address], [place] Leim Sims, [employer]    Office Visit    1 year ago Degenerative tear of medial meniscus of right knee    Yoselyn Schaefer MD    Office Visit    1 year ago Essential hypertension    [address], [address], [place] Le[place] Sims, [employer]    Office Visit

## 2023-10-09 ENCOUNTER — TELEPHONE (OUTPATIENT)
Dept: FAMILY MEDICINE CLINIC | Facility: CLINIC | Age: 43
End: 2023-10-09

## 2023-10-09 DIAGNOSIS — M25.561 CHRONIC PAIN OF RIGHT KNEE: ICD-10-CM

## 2023-10-09 DIAGNOSIS — G89.29 CHRONIC PAIN OF RIGHT KNEE: ICD-10-CM

## 2023-10-09 RX ORDER — TRAMADOL HYDROCHLORIDE 50 MG/1
50 TABLET ORAL EVERY 6 HOURS PRN
Qty: 120 TABLET | Refills: 0 | OUTPATIENT
Start: 2023-10-09

## 2023-10-09 NOTE — TELEPHONE ENCOUNTER
Last visit 10/18/22    No future appointments. Left message on dedicated voicemail that early refill was declined. To call back with questions.

## 2023-10-09 NOTE — TELEPHONE ENCOUNTER
Verified name and . [facility] of [facility]. She states that she will be leaving to [place] to help take care of family on 10/11/23 and that is why she is requesting that she be able to  her Tramadol prescription before she leaves. She is asking that prescription be approved for early refill on 10/10/23 so that she has her medication when she goes to [place]. She is asking that message be sent to [facility] again to request this again.

## 2023-10-09 NOTE — TELEPHONE ENCOUNTER
Drew RUSS  Patient seeking early refill for tramadol and if pharmacy can be informed she can  Wednesday morning. She is leaving town Wednesday for 18 days. Patient Has no insurance. Hasn't been able to come into office. Next enrollment for her is November.

## 2023-10-10 ENCOUNTER — TELEMEDICINE (OUTPATIENT)
Dept: FAMILY MEDICINE CLINIC | Facility: CLINIC | Age: 43
End: 2023-10-10

## 2023-10-10 DIAGNOSIS — M25.561 CHRONIC PAIN OF RIGHT KNEE: ICD-10-CM

## 2023-10-10 DIAGNOSIS — G89.29 CHRONIC PAIN OF RIGHT KNEE: ICD-10-CM

## 2023-10-10 PROCEDURE — 99441 PHONE E/M BY PHYS 5-10 MIN: CPT | Performed by: PHYSICIAN ASSISTANT

## 2023-10-10 RX ORDER — TRAMADOL HYDROCHLORIDE 50 MG/1
50 TABLET ORAL EVERY 6 HOURS PRN
Qty: 120 TABLET | Refills: 0 | Status: SHIPPED | OUTPATIENT
Start: 2023-10-10

## 2023-10-10 NOTE — TELEPHONE ENCOUNTER
Patient calling to follow-up on explanation below regarding request for early refill, confirmed name and . She explains again that she has a family emergency requiring her to leave for [place] on 10/11 for 2 weeks. Reminded patient that she has not seen Beka Smoke since January. She states that she has no insurance until next month and that Beka Smoke is aware of this.     Assisted patient to schedule an appointment:  Future Appointments   Date Time Provider Ricco Patel   10/10/2023  2:30 PM Cindy Ramirez PA-C [facility] [place]

## 2023-11-26 DIAGNOSIS — M25.561 CHRONIC PAIN OF RIGHT KNEE: ICD-10-CM

## 2023-11-26 DIAGNOSIS — G89.29 CHRONIC PAIN OF RIGHT KNEE: ICD-10-CM

## 2023-11-27 RX ORDER — TRAMADOL HYDROCHLORIDE 50 MG/1
50 TABLET ORAL EVERY 6 HOURS PRN
Qty: 120 TABLET | Refills: 0 | Status: SHIPPED | OUTPATIENT
Start: 2023-11-27

## 2023-11-27 NOTE — TELEPHONE ENCOUNTER
Please review; protocol failed. Requested Prescriptions   Pending Prescriptions Disp Refills    traMADol 50 MG Oral Tab 120 tablet 0     Sig: Take 1 tablet (50 mg total) by mouth every 6 (six) hours as needed for Pain.        There is no refill protocol information for this order        Recent Outpatient Visits              1 month ago Chronic pain of right knee    [address], [place], [place]    Telemedicine    10 months ago Degenerative tear of medial meniscus of right knee    [facility]aren Conway, [address] [facility],[address], [place] Rucker MD    Office Visit    1 year ago Chronic pain of right knee    [employer] [facility], [address], [place] Alena EvesMARGI    Telemedicine    1 year ago Acute non intractable tension-type headache    [facility], [address], [place], [place]    Office Visit    1 year ago Degenerative tear of medial meniscus of right knee    Jaren Conway, [address] [facility],[address], Remington Rucker MD    Office Visit

## 2023-12-22 DIAGNOSIS — G89.29 CHRONIC PAIN OF RIGHT KNEE: ICD-10-CM

## 2023-12-22 DIAGNOSIS — M25.561 CHRONIC PAIN OF RIGHT KNEE: ICD-10-CM

## 2023-12-23 RX ORDER — TRAMADOL HYDROCHLORIDE 50 MG/1
50 TABLET ORAL EVERY 6 HOURS PRN
Qty: 120 TABLET | Refills: 0 | Status: SHIPPED | OUTPATIENT
Start: 2023-12-23

## 2023-12-23 NOTE — TELEPHONE ENCOUNTER
Please review. Protocol failed or has no protocol. Requested Prescriptions   Pending Prescriptions Disp Refills    traMADol 50 MG Oral Tab 120 tablet 0     Sig: Take 1 tablet (50 mg total) by mouth every 6 (six) hours as needed for Pain.        There is no refill protocol information for this order          Recent Outpatient Visits              2 months ago Chronic pain of right knee    [facility], [address], [place], [place]    Telemedicine    11 months ago Degenerative tear of medial meniscus of right knee    [facility], [address] [place] [address],[address], Ivelisse Barry MD    Office Visit    1 year ago Chronic pain of right knee    [facility], [address], [place] [facility]    [facility]    1 year ago Acute non intractable tension-type headache    [facility], [address], [place] PA-C    Office Visit    2 years ago Degenerative tear of medial meniscus of right knee    [address] [place] [address], Ivelisse Barry MD    Office Visit

## 2023-12-23 NOTE — TELEPHONE ENCOUNTER
Patient calling to follow up on refill request. Will be contacting her orthopedic to get her knee surgery rescheduled since has new insurance now through her new job. Will call back to schedule pre-op exam with Steffany Quinteros, but needs a refill on her tramadol in the meantime. Please advise.

## 2024-01-02 ENCOUNTER — TELEPHONE (OUTPATIENT)
Dept: ORTHOPEDICS CLINIC | Facility: CLINIC | Age: 44
End: 2024-01-02

## 2024-01-02 NOTE — TELEPHONE ENCOUNTER
Pt last seen Jan 2023. Called pt and advised she would need to f/u in office again prior to scheduling sx. Appt scheduled on 1/30 at 4:30pm with Dr Duffy. She had no further concerns.

## 2024-01-02 NOTE — TELEPHONE ENCOUNTER
Patient calling would like to know if she can reschedule her knee surgery that she had to cancelled In 1/2023  Please advse

## 2024-01-23 DIAGNOSIS — G89.29 CHRONIC PAIN OF RIGHT KNEE: ICD-10-CM

## 2024-01-23 DIAGNOSIS — M25.561 CHRONIC PAIN OF RIGHT KNEE: ICD-10-CM

## 2024-01-24 DIAGNOSIS — M25.561 CHRONIC PAIN OF RIGHT KNEE: ICD-10-CM

## 2024-01-24 DIAGNOSIS — G89.29 CHRONIC PAIN OF RIGHT KNEE: ICD-10-CM

## 2024-01-24 RX ORDER — TRAMADOL HYDROCHLORIDE 50 MG/1
50 TABLET ORAL EVERY 6 HOURS PRN
Qty: 120 TABLET | Refills: 0 | Status: SHIPPED | OUTPATIENT
Start: 2024-01-24

## 2024-01-24 NOTE — TELEPHONE ENCOUNTER
Please review; protocol failed/ has no protocol    Requested Prescriptions   Pending Prescriptions Disp Refills    traMADol 50 MG Oral Tab 120 tablet 0     Sig: Take 1 tablet (50 mg total) by mouth every 6 (six) hours as needed for Pain.       There is no refill protocol information for this order        Recent Outpatient Visits              3 months ago Chronic pain of right knee    [facility], [place] PA-C    Telemedicine    1 year ago [facility]ener[facility] medial meniscus [facility]    Office Visit    1 year ago Chronic pain of right knee    [facility], [address], [place] PA-C    Telemedicine    1 year ago [facility] non int[facility]    [facility] Visit    2 years ago Degenerative tear of medial meniscus of right knee    [facility]    [facility]          Future Appointments         Provider Department Appt Notes    In 6 days Bran Duffy MD [facility], [place] discuss sx

## 2024-01-25 RX ORDER — TRAMADOL HYDROCHLORIDE 50 MG/1
50 TABLET ORAL EVERY 6 HOURS PRN
Qty: 120 TABLET | Refills: 0 | OUTPATIENT
Start: 2024-01-25

## 2024-01-25 NOTE — TELEPHONE ENCOUNTER
Pharmacy    [id number] - [address] [phone], [phone]      Disp Refills Start End    traMADol 50 MG Oral Tab 120 tablet 0 1/24/2024 --    Sig - Route: Take 1 tablet (50 mg total) by mouth every 6 (six) hours as needed for Pain. - Oral    Sent to pharmacy as: traMADol HCl 50 MG Oral Tablet (Ultram)    Notes to Pharmacy: OK to fill early. Patient is going out of town. Patient will  tomorrow.    E-Prescribing Status: Receipt confirmed by pharmacy (1/24/2024  5:07 PM CST)

## 2024-01-30 ENCOUNTER — HOSPITAL ENCOUNTER (OUTPATIENT)
Dept: GENERAL RADIOLOGY | Facility: HOSPITAL | Age: 44
Discharge: HOME | End: 2024-01-30
Attending: ORTHOPAEDIC SURGERY
Payer: COMMERCIAL

## 2024-01-30 ENCOUNTER — OFFICE VISIT (OUTPATIENT)
Dept: ORTHOPEDICS CLINIC | Facility: CLINIC | Age: 44
End: 2024-01-30

## 2024-01-30 VITALS
HEART RATE: 86 BPM | SYSTOLIC BLOOD PRESSURE: 155 MMHG | DIASTOLIC BLOOD PRESSURE: 89 MMHG | HEIGHT: 65.14 IN | BODY MASS INDEX: 37.99 KG/M2 | WEIGHT: 228 LBS

## 2024-01-30 DIAGNOSIS — Z47.89 ORTHOPEDIC AFTERCARE: ICD-10-CM

## 2024-01-30 DIAGNOSIS — E66.01 CLASS 2 SEVERE OBESITY DUE TO EXCESS CALORIES WITH SERIOUS COMORBIDITY AND BODY MASS INDEX (BMI) OF 37.0 TO 37.9 IN ADULT  (HCC): ICD-10-CM

## 2024-01-30 DIAGNOSIS — M25.561 CHRONIC PAIN OF RIGHT KNEE: ICD-10-CM

## 2024-01-30 DIAGNOSIS — M23.203 DEGENERATIVE TEAR OF MEDIAL MENISCUS OF RIGHT KNEE: Primary | ICD-10-CM

## 2024-01-30 DIAGNOSIS — G89.29 CHRONIC PAIN OF RIGHT KNEE: ICD-10-CM

## 2024-01-30 PROCEDURE — 3008F BODY MASS INDEX DOCD: CPT | Performed by: ORTHOPAEDIC SURGERY

## 2024-01-30 PROCEDURE — 73562 X-RAY EXAM OF KNEE 3: CPT | Performed by: ORTHOPAEDIC SURGERY

## 2024-01-30 PROCEDURE — 3077F SYST BP >= 140 MM HG: CPT | Performed by: ORTHOPAEDIC SURGERY

## 2024-01-30 PROCEDURE — 3079F DIAST BP 80-89 MM HG: CPT | Performed by: ORTHOPAEDIC SURGERY

## 2024-01-30 PROCEDURE — 99213 OFFICE O/P EST LOW 20 MIN: CPT | Performed by: ORTHOPAEDIC SURGERY

## 2024-01-30 PROCEDURE — 20610 DRAIN/INJ JOINT/BURSA W/O US: CPT | Performed by: ORTHOPAEDIC SURGERY

## 2024-01-30 RX ORDER — TRIAMCINOLONE ACETONIDE 40 MG/ML
40 INJECTION, SUSPENSION INTRA-ARTICULAR; INTRAMUSCULAR ONCE
Status: COMPLETED | OUTPATIENT
Start: 2024-01-30 | End: 2024-01-30

## 2024-01-30 RX ADMIN — TRIAMCINOLONE ACETONIDE 40 MG: 40 INJECTION, SUSPENSION INTRA-ARTICULAR; INTRAMUSCULAR at 18:38:00

## 2024-01-30 NOTE — PROGRESS NOTES
NURSING INTAKE COMMENTS:   Chief Complaint   Patient presents with    Knee Pain     Right f/u - states the pain got worse she fell on 12/31/23 - rates pain as 8/10 on and off - states the knee is giving out - she wants to discuss sx        HPI: This 43 year old female presents today since my last visit in January 2023.  At that time we talked about arthroscopy for degenerative medial meniscus.  We also talked about weight loss.  Since her last visit, she has gained weight.  She had a fall in December 2023 and the right knee pain became worse.  Last MRI was August 2022.  She has had therapy in the past.  She still points to the medial joint line right knee.  Today's x-rays show mild medial joint space narrowing but not greater than 50%.    Past Medical History:   Diagnosis Date    Essential hypertension      Past Surgical History:   Procedure Laterality Date    ARTHROTOMY,OPEN REPAIR MENISCUS Right     2015     Current Outpatient Medications   Medication Sig Dispense Refill    traMADol 50 MG Oral Tab Take 1 tablet (50 mg total) by mouth every 6 (six) hours as needed for Pain. 120 tablet 0     Allergies   Allergen Reactions    Other UNKNOWN     Muscle relaxers cause shaking     Family History   Problem Relation Age of Onset    Ovarian Cancer Mother 49     No family Hx of DVT/PE    Social History     Occupational History    Not on file   Tobacco Use    Smoking status: Some Days     Packs/day: 0.15     Years: 24.00     Additional pack years: 0.00     Total pack years: 3.60     Types: Cigarettes    Smokeless tobacco: Never   Vaping Use    Vaping Use: Never used   Substance and Sexual Activity    Alcohol use: Yes    Drug use: Never    Sexual activity: Not on file        Review of Systems:  GENERAL: feels generally well, no significant weight loss or weight gain  SKIN: no ulcerated or worrisome skin lesions  EYES:denies blurred vision or double vision  HEENT: denies new nasal congestion, sinus pain or ST  LUNGS: denies  shortness of breath  CARDIOVASCULAR: denies chest pain  GI: no hematemesis, no worsening heartburn, no diarrhea  : no dysuria, no blood in urine, no difficulty urinating, no incontinence  MUSCULOSKELETAL: no other musculoskeletal complaints other than in HPI  NEURO: no numbness or tingling, no weakness or balance disorder  PSYCHE: no depression or anxiety  HEMATOLOGIC: no hx of blood dyscrasia, no Hx DVT/PE  ENDOCRINE: no thyroid or diabetes issues  ALL/ASTHMA: no new hx of severe allergy or asthma    Physical Examination:    BP (!) 161/93   Pulse 78   Ht 5' 5.14\" (1.655 m)   Wt 228 lb (103.4 kg)   BMI 37.78 kg/m²   Constitutional: appears well hydrated, alert and responsive, no acute distress noted  Extremities:  Right knee without asymmetric warmth or effusion.  Right leg without pitting edema or calf tenderness.  Musculoskeletal: Motion 0 to 105 degrees limited a little bit the size of her thigh.  No instability.  Patellofemoral joint without crepitus or pain and lateral side nontender.  Neurological: Normal motor and sensory right lower extremity.    Imaging: X-rays in the office today show greater than 50% joint space remaining but some narrowing in the medial compartment.  Other compartments appeared normal.  MRI August 2022 shows a degenerative medial meniscus tear.      No results found.     Lab Results   Component Value Date    WBC 9.8 03/05/2021    HGB 13.6 03/05/2021    .0 03/05/2021      Lab Results   Component Value Date     (H) 03/05/2021    BUN 10 03/05/2021    CREATSERUM 0.70 03/05/2021    GFRNAA 108 03/05/2021    GFRAA 124 03/05/2021        Assessment and Plan:  Diagnoses and all orders for this visit:    Degenerative tear of medial meniscus of right knee  -     MRI KNEE, RIGHT (VAL=49943); Future  -     Arthrocentesis aspiration and injection major Right joint bursa w/o US  -     triamcinolone acetonide (Kenalog-40) 40 MG/ML injection 40 mg    Orthopedic aftercare  -     XR KNEE  (3 VIEWS), RIGHT (CPT=73562); Future    Class 2 severe obesity due to excess calories with serious comorbidity and body mass index (BMI) of 37.0 to 37.9 in adult  ([facility])  -     DIETITIAN EDUCATION INITIAL, DIET (INTERNAL)  -     BARIATRICS - INTERNAL    Chronic pain of right knee  -     MRI KNEE, RIGHT (IFG=74061); Future  -     Arthrocentesis aspiration and injection major Right joint bursa w/o US  -     triamcinolone acetonide (Kenalog-40) 40 MG/ML injection 40 mg        Assessment: Above diagnoses.    Plan: At past visit she said she was going to try to lose weight on her own.  I told her at this point that is clearly not working.  She agreed to dietitian and bariatric consults.  Referrals were placed in her chart.  We talked about calorie counting and portion control.    For the right knee I told her eventually she will probably have a knee replacement.  I let her know we can talk about the knee arthroscopy however resecting some of the degenerative meniscus can cause the need to progress the arthritis more quickly instead of helping her pain.  I told her there is no way to tell which way she would go.  Today offered cortisone injections at a surgery.  We will get a new MRI since the last one was August 2022.  Will see her back in 6 weeks and we will see how she is doing as well as get a height and weight.    Follow Up: No follow-ups on file.    Bran Duffy MD

## 2024-01-31 NOTE — PROGRESS NOTES
Per verbal order from Dr. Duffy, draw up 5ml of 0.5% Marcaine and 1ml of Kenalog 40 for cortisone injection to right knee. Amaris PALM MA  Patient provided education handout for cortisone injection.

## 2024-02-20 DIAGNOSIS — G89.29 CHRONIC PAIN OF RIGHT KNEE: ICD-10-CM

## 2024-02-20 DIAGNOSIS — M25.561 CHRONIC PAIN OF RIGHT KNEE: ICD-10-CM

## 2024-02-20 RX ORDER — TRAMADOL HYDROCHLORIDE 50 MG/1
50 TABLET ORAL EVERY 6 HOURS PRN
Qty: 120 TABLET | Refills: 0 | Status: CANCELLED | OUTPATIENT
Start: 2024-02-20

## 2024-02-21 ENCOUNTER — OFFICE VISIT (OUTPATIENT)
Dept: FAMILY MEDICINE CLINIC | Facility: CLINIC | Age: 44
End: 2024-02-21
Payer: COMMERCIAL

## 2024-02-21 ENCOUNTER — TELEPHONE (OUTPATIENT)
Dept: FAMILY MEDICINE CLINIC | Facility: CLINIC | Age: 44
End: 2024-02-21

## 2024-02-21 VITALS
WEIGHT: 228 LBS | SYSTOLIC BLOOD PRESSURE: 166 MMHG | HEIGHT: 65 IN | DIASTOLIC BLOOD PRESSURE: 102 MMHG | BODY MASS INDEX: 37.99 KG/M2 | HEART RATE: 118 BPM

## 2024-02-21 DIAGNOSIS — G89.29 CHRONIC PAIN OF RIGHT KNEE: ICD-10-CM

## 2024-02-21 DIAGNOSIS — Z12.31 ENCOUNTER FOR SCREENING MAMMOGRAM FOR BREAST CANCER: ICD-10-CM

## 2024-02-21 DIAGNOSIS — L02.211 ABSCESS OF ABDOMINAL WALL: Primary | ICD-10-CM

## 2024-02-21 DIAGNOSIS — M25.561 CHRONIC PAIN OF RIGHT KNEE: ICD-10-CM

## 2024-02-21 DIAGNOSIS — I10 ESSENTIAL HYPERTENSION: ICD-10-CM

## 2024-02-21 PROCEDURE — 99213 OFFICE O/P EST LOW 20 MIN: CPT | Performed by: PHYSICIAN ASSISTANT

## 2024-02-21 RX ORDER — NAPROXEN 500 MG/1
500 TABLET ORAL 2 TIMES DAILY WITH MEALS
Qty: 60 TABLET | Refills: 0 | Status: SHIPPED
Start: 2024-02-21

## 2024-02-21 RX ORDER — TRAMADOL HYDROCHLORIDE 50 MG/1
50 TABLET ORAL EVERY 6 HOURS PRN
Qty: 120 TABLET | Refills: 0 | Status: SHIPPED | OUTPATIENT
Start: 2024-02-21

## 2024-02-21 RX ORDER — CEPHALEXIN 500 MG/1
500 CAPSULE ORAL 4 TIMES DAILY
Qty: 28 CAPSULE | Refills: 0 | Status: SHIPPED
Start: 2024-02-21 | End: 2024-02-28

## 2024-02-21 RX ORDER — LOSARTAN POTASSIUM 50 MG/1
50 TABLET ORAL DAILY
Qty: 90 TABLET | Refills: 1 | Status: SHIPPED
Start: 2024-02-21 | End: 2024-05-21

## 2024-02-21 NOTE — PROGRESS NOTES
HPI:     HPI  43 year-old female is here in the office complaining of a lesion on the lower abdomen for the past week. The lesion is red and painful. The lesion is the same size, not getting bigger.  Patient denies of fever, N/V, headache.    Medications:     Current Outpatient Medications   Medication Sig Dispense Refill    losartan 50 MG Oral Tab Take 1 tablet (50 mg total) by mouth daily. 90 tablet 1    mupirocin 2 % External Ointment Apply to affect lesions tid 30 g 0    cephalexin 500 MG Oral Cap Take 1 capsule (500 mg total) by mouth 4 (four) times daily for 7 days. 28 capsule 0    naproxen 500 MG Oral Tab Take 1 tablet (500 mg total) by mouth 2 (two) times daily with meals. 60 tablet 0    traMADol 50 MG Oral Tab Take 1 tablet (50 mg total) by mouth every 6 (six) hours as needed for Pain. 120 tablet 0       Allergies:     Allergies   Allergen Reactions    Other UNKNOWN     Muscle relaxers cause shaking       History:     Health Maintenance   Topic Date Due    Diabetes Care Foot Exam  Never done    Diabetes Care Dilated Eye Exam  Never done    Diabetes Care: Microalb/Creat Ratio  Never done    Annual Physical  Never done    Pneumococcal Vaccine: Birth to 64yrs (1 of 2 - PCV) Never done    Tobacco Cessation Counseling 1 Year  Never done    Diabetes Care A1C  01/27/2022    Diabetes Care: GFR  03/05/2022    Pap Smear  10/01/2022    Mammogram  10/21/2022    COVID-19 Vaccine (3 - 2023-24 season) 09/01/2023    Influenza Vaccine (1) 10/01/2023    Annual Depression Screening  Never done    DTaP,Tdap,and Td Vaccines (3 - Td or Tdap) 11/03/2026       No LMP recorded. (Menstrual status: Irregular Periods).   Past Medical History:     Past Medical History:   Diagnosis Date    Essential hypertension        Past Surgical History:     Past Surgical History:   Procedure Laterality Date    Arthrotomy,open repair meniscus Right     2015       Family History:     Family History   Problem Relation Age of Onset    Ovarian Cancer  Mother 49       Social History:     Social History     Socioeconomic History    Marital status: Single     Spouse name: Not on file    Number of children: Not on file    Years of education: Not on file    Highest education level: Not on file   Occupational History    Not on file   Tobacco Use    Smoking status: Some Days     Packs/day: 0.15     Years: 24.00     Additional pack years: 0.00     Total pack years: 3.60     Types: Cigarettes    Smokeless tobacco: Never   Vaping Use    Vaping Use: Never used   Substance and Sexual Activity    Alcohol use: Yes    Drug use: Never    Sexual activity: Not on file   Other Topics Concern    Not on file   Social History Narrative    Not on file     Social Determinants of Health     Financial Resource Strain: Not on file   Food Insecurity: Not on file   Transportation Needs: Not on file   Physical Activity: Not on file   Stress: Not on file   Social Connections: Not on file   Housing Stability: Not on file       Review of Systems:   Review of Systems   Constitutional:  Negative for activity change, chills, fatigue and fever.   HENT:  Negative for congestion, ear discharge, ear pain, postnasal drip, rhinorrhea, sinus pressure, sinus pain and sore throat.    Respiratory:  Negative for cough, chest tightness, shortness of breath and wheezing.    Cardiovascular:  Negative for chest pain and palpitations.   Gastrointestinal:  Negative for abdominal distention, abdominal pain, blood in stool, constipation, diarrhea, nausea and vomiting.   Skin:  Positive for rash.        Vitals:    02/21/24 1507 02/21/24 1523   BP: (!) 178/108 (!) 166/102   Pulse: 118    Weight: 228 lb (103.4 kg)    Height: 5' 5\" (1.651 m)      Body mass index is 37.94 kg/m².    Physical Exam:   Physical Exam  Vitals reviewed.      There is an abscess 2 inches x 1 inch on the right lower abdomen.    Assessment and Plan::     Problem List Items Addressed This Visit          Cardiac and Vasculature    Essential  hypertension    Relevant Medications    losartan 50 MG Oral Tab    Other Relevant Orders    MyChart Blood Pressure Flowsheet       Musculoskeletal and Injuries    Chronic pain of right knee    Relevant Medications    naproxen 500 MG Oral Tab    traMADol 50 MG Oral Tab     Other Visit Diagnoses       Abscess of abdominal wall    -  Primary    Relevant Medications    mupirocin 2 % External Ointment    cephalexin 500 MG Oral Cap    naproxen 500 MG Oral Tab    Encounter for screening mammogram for breast cancer        Relevant Medications            Other Relevant Orders    [facility] GREY 2D+3D SCREENING BILAT (CPT=77067/95663)        - Advise patient to proceed to ER if the abscess worsen.    Discussed plan of care with pt and pt is in agreement.All questions answered. Pt to call with questions or concerns.

## 2024-02-21 NOTE — TELEPHONE ENCOUNTER
HISTORY OF PRESENT ILLNESS  Marni Powell is a 38 y.o. female. HPI   ESTABLISHED patient here today for post op follow up. S/p LEFT ductal excision on 11/16/17. Her LEFT nipple remains tender. Denies any other breast problems at this time.      Breast history-  2001- bilateral breast reduction  11/16/17- LEFT ductal excision    ROS    Physical Exam    ASSESSMENT and PLAN  {ASSESSMENT/PLAN:98846} Received page, patient saw PETRONA Khan in office today.  Was prescribed for different medications following her visit.  Pharmacy system is down this medication called into pharmacy.  Unable to appreciate PA's note at this time, able to visualize prescriptions ordered.    Spoke with patient and she will herself at 1711.  Confirmed preferred pharmacy  Spoke with Holly at [facility] in [facility], prescriptions called in for:  Keflex 500 mg 4 times daily x 7 days  Losartan 50 mg daily 90 pills with 1 refill  Mupirocin 2% 30 g tube apply 3 times daily to affected lesions  Naproxen 500 mg twice daily x 60 tablets no refills    Kedar Shaffer, APRN

## 2024-02-21 NOTE — PATIENT INSTRUCTIONS
Controlling High Blood Pressure   High blood pressure (hypertension) is often called the silent killer. This is because many people who have it, don’t know it. It can be very dangerous. High blood pressure can raise your risk of heart attack, stroke, heart disease, and heart failure. Controlling your blood pressure can lower your risk of these problems. It's important to check yourblood pressure regularly. It can save your life.   Blood pressure measurements are given as 2 numbers. Systolic blood pressure is the upper number. This is the pressure when the heart contracts. Diastolic blood pressure is the lower number. This is the pressure when the heartrelaxes between beats.   Blood pressure is grouped like this:   Normal blood pressure. This is systolic of less than 120 and diastolic of less than 80 (120/80).  Elevated blood pressure.  This is systolic of 120 to 129 and diastolic less than 80.  Stage 1 high blood pressure.  This is systolic of 130 to 139 or diastolic between 80 to 89.  Stage 2 high blood pressure.  This is systolic of 140 or higher or diastolic of 90 or higher.  A heart-healthy lifestyle can help you control your blood pressure withoutmedicines. Below are some things you can do to have a heart-healthy lifestyle.     Eat heart-healthy foods   Choose low-salt, low-fat foods. Limit your sodium to 2,300 mg per day or the amount advised by your healthcare provider.  Limit canned, dried, cured, packaged, and fast foods. These can contain a lot of salt.  Eat 8 to 10 servings of fruits and vegetables every day.  Choose lean meats, fish, or chicken.  Eat whole-grain pasta, brown rice, and beans.  Eat 2 to 3 servings of low-fat or fat-free dairy products.  Ask your doctor about the DASH eating plan. This plan helps reduce blood pressure.  When you go to a restaurant, ask that your meal be made with no added salt.    Stay at a healthy weight   Ask your healthcare provider how many calories to eat a day. Then  stick to that number.  Ask your provider what weight range is healthiest for you. If you are overweight, a weight loss of only 3% to 5% of your body weight can help lower blood pressure. A good weight loss goal is to lose 10% of your body weight in a year.  Limit snacks and sweets.  Get regular exercise.    Get more active   Find activities you enjoy. They can be done alone or with friends or family. Try bicycling, dancing, walking, or jogging.  Park farther away from building entrances to walk more.  Use stairs instead of the elevator.  When you can, walk or bike instead of driving.  [place] leaves, garden, or do household repairs.  Be active at a moderate to vigorous level of physical activity for at least 30 minutes a day for at least 5 days a week.     Manage stress   Make time to relax and enjoy life. Find time to laugh.  Talk about your concerns with your loved ones and your healthcare provider.  Visit with family and friends, and keep up with hobbies.    Limit alcohol and quit smoking   Men should have no more than 2 drinks per day.  Women should have no more than 1 drink per day.  If you smoke, make a plan to stop. Talk with your healthcare provider for help. Smoking greatly raises your risk for heart disease and stroke. Ask your provider about stop-smoking programs and other support.    Blood pressure medicines  If your lifestyle changes aren’t enough, your healthcare provider may prescribe high blood pressure medicine. Take all medicines as prescribed. If you have any questions about yourmedicines, ask your provider before stopping or changing them.   [employer] last reviewed this educational content on12/1/2021 © 2000-2022 [employer]. All rights reserved. This information is not intended as a substitute for professional medical care. Always follow your[facility] Cent[facility] professional's instruction    [employer]™  What is High Blood Pressure?  Understand what blood pressure is, the health risks  of having high blood pressure, the factors that put you at risk for having high blood pressure, and the importance of working with your healthcare provider to control it.  To watch the video:  Scan the QR code  Using your mobile device, scan the following code:  OR  Go to the website:  Mems-ID  Enter the prescription code:  3414E    © 2000-2022 [employer]. All rights reserved. This information is not intended as a substitute for professional medical care. Always follow your healthcare professional's instructions.    [employer]  Eating Well with High Blood Pressure  Certain foods can make your blood pressure go too high. Watch and learn how easy it is to have delicious meals without harming your health.     To watch the video:  Scan the QR code  Using your mobile device, scan the following code:  OR  Go to the website:  www.kramesvideo.com  Enter the prescription code:   QIX       © 2000-2022 [employer]. All rights reserved. This information is not intended as a substitute for professional medical care. Always follow your healthcare professional's instructions.    Taking Your Blood Pressure  Blood pressure is the force of blood against the artery wall as it moves from the heart through the blood vessels. You can take your own blood pressure reading using a digital monitor. Take your readings the same each time, usingthe same arm. Take readings as often as your healthcare provider advises.   About blood pressure monitors  Blood pressure monitors are designed for certain ages and cases. You can find monitors for older adults, for pregnant women, and for children. Make sure theone you choose is the right one for your age and situation.   Experts advise an automatic cuff monitor that fits on your upper arm (bicep). The cuff should fit your arm size. A cuff that’s too large or too small won't give an accurate reading. Measure around yourupper arm to find your  size.   Monitors that attach to your finger or wrist are not as accurate as monitorsfor your upper arm.   Ask your healthcare provider for help in choosing a monitor. Bring your monitorto your next provider visit if you need help in using it the correct way.   The steps below are general instructions for using an automatic digitalmonitor.   Step 1. Relax    Take your blood pressure at the same time every day, such as in the morning or evening. Or take it at the time your healthcare provider advises.  Wait at least 30 minutes after smoking, eating, or exercising. Don't drink coffee, tea, soda, or other caffeinated drinks before checking your blood pressure. Use the restroom beforehand.  Sit comfortably at a table with both feet on the floor. Don't cross your legs or feet. Place the monitor near you.  Rest for at least 5 minutes before you begin. Make sure there are no distractions. This includes TV, cell phones, and other electronics. Wait to have conversations with others until after you measure you blood pressure.  Step 2. Wrap the cuff    Place your arm on the table, palm up. Your arm should be at the level of your heart. Wrap the cuff around your upper arm, just above your elbow. It’s best done on bare skin, not over clothing. Most cuffs will show you where the blood vessel in the middle of the arm at the inner side of the elbow (the brachial artery) should line up with the cuff. Look in your monitor's instruction booklet for an illustration. You can also bring your cuff to your healthcare provider and have them show you how to correctly place the cuff.  Step 3. Inflate the cuff    Push the button that starts the pump.  The cuff will tighten, then loosen.  The numbers will change. When they stop changing, your blood pressure reading will appear.  Take 2 or 3 readings 1 minute apart, or as advised by your provider.  Step 4. Write down the results of each reading    Write down your blood pressure numbers for each  reading. Note the date and time. Keep your results in 1 place, such as a notebook. Even if your monitor has a built-in memory, keep a hard copy of the readings.  Remove the cuff from your arm. Turn off the machine.  Bring your blood pressure records with you to each provider visit.  If you start a new blood pressure medicine, note the day you started the new medicine. Also note the day if you change the dose of your medicine. Measure your blood pressure before your take your medicine. This information goes on your blood pressure recording sheet. This will help your provider check how well the medicine changes are working.  Ask your provider what numbers mean that you should call them. Also ask what numbers mean that you should get help right away.  Preet last reviewed this educational content on12/1/2021 © 2000-2022 [employer]. All rights reserved. This information is not intended as a substitute for professional medical care. Always follow yourhealthcare professional's instructions.

## 2024-02-23 ENCOUNTER — TELEPHONE (OUTPATIENT)
Dept: FAMILY MEDICINE CLINIC | Facility: CLINIC | Age: 44
End: 2024-02-23

## 2024-02-23 NOTE — TELEPHONE ENCOUNTER
Bill, patient started medication yesterday, has a lot of pain with movement. 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 Please complete note with length of restrictions. Thank you.    Spoke to patient (name and  of patient verified). She is calling to report abscess is very painful when working. She went to  medication on Wednesday, system was down so unable to charge insurance, started medication yesterday.  Requesting letter with work restrictions: lifting, pulling, pushing patients (works as a PCT)

## 2024-02-23 NOTE — TELEPHONE ENCOUNTER
Per AVIS, pt was informed via voice mail, letter placed via [employer] and to call back with question.

## 2024-03-12 ENCOUNTER — HOSPITAL ENCOUNTER (OUTPATIENT)
Dept: MRI IMAGING | Age: 44
Discharge: HOME | End: 2024-03-12
Attending: ORTHOPAEDIC SURGERY
Payer: COMMERCIAL

## 2024-03-12 DIAGNOSIS — M25.561 CHRONIC PAIN OF RIGHT KNEE: ICD-10-CM

## 2024-03-12 DIAGNOSIS — G89.29 CHRONIC PAIN OF RIGHT KNEE: ICD-10-CM

## 2024-03-12 DIAGNOSIS — M23.203 DEGENERATIVE TEAR OF MEDIAL MENISCUS OF RIGHT KNEE: ICD-10-CM

## 2024-03-12 PROCEDURE — 73721 MRI JNT OF LWR EXTRE W/O DYE: CPT | Performed by: ORTHOPAEDIC SURGERY

## 2024-03-14 ENCOUNTER — PATIENT MESSAGE (OUTPATIENT)
Dept: FAMILY MEDICINE CLINIC | Facility: CLINIC | Age: 44
End: 2024-03-14

## 2024-03-17 NOTE — TELEPHONE ENCOUNTER
Reading recorded:        3-15-24                 10:15am  B/P 154/85  Sitting  Feet down      3-  9:45  B/P 139/74  Laying down

## 2024-03-18 DIAGNOSIS — G89.29 CHRONIC PAIN OF RIGHT KNEE: ICD-10-CM

## 2024-03-18 DIAGNOSIS — M25.561 CHRONIC PAIN OF RIGHT KNEE: ICD-10-CM

## 2024-03-18 NOTE — TELEPHONE ENCOUNTER
Updated BP.   Chart updated to reflect today's reading.              Tasia GIBSON Em Triage Support (supporting Oliva Yeager RN)2 hours ago (11:22 AM)     MG  3-  B/P 143/56  Sitting   Feet down

## 2024-03-19 NOTE — TELEPHONE ENCOUNTER
Please review. Protocol failed or has no protocol.     Requested Prescriptions   Pending Prescriptions Disp Refills    traMADol 50 MG Oral Tab 120 tablet 0     Sig: Take 1 tablet (50 mg total) by mouth every 6 (six) hours as needed for Pain.       Controlled Substance Medication Failed - 3/18/2024  2:13 PM        Failed - This medication is a controlled substance - forward to provider to refill             Recent Outpatient Visits              3 weeks ago Abscess of abdominal wall    [facility], [address], [place] Davina Angeles PA-C    Office Visit    1 month ago [facility]ener[facility]    [facility]    5 months ago Chronic pain of right knee    [facility], [address], [place] Davina Angeles PA-C    Telemedicine    1 year ago Degenerative tear of medial meniscus of right knee    [facility] Bran Duffy MD    Office Visit    1 year ago Chronic pain of right knee    [facility], [address], [place] Davina Angeles PA-C    Telemedicine            Future Appointments         Provider Department Appt Notes    In 1 month Bran Duffy MD [facility] follow up right knee, mri results, policy informed    In 1 month L[facility] DEXA RM1; [facility] JOAN RM1 [facility] - [place]     In 4 months Андрей Carpio MD [facility] Weight loss

## 2024-03-20 RX ORDER — TRAMADOL HYDROCHLORIDE 50 MG/1
50 TABLET ORAL EVERY 6 HOURS PRN
Qty: 120 TABLET | Refills: 0 | Status: SHIPPED | OUTPATIENT
Start: 2024-03-20

## 2024-04-17 DIAGNOSIS — M25.561 CHRONIC PAIN OF RIGHT KNEE: ICD-10-CM

## 2024-04-17 DIAGNOSIS — G89.29 CHRONIC PAIN OF RIGHT KNEE: ICD-10-CM

## 2024-04-18 DIAGNOSIS — M25.561 CHRONIC PAIN OF RIGHT KNEE: ICD-10-CM

## 2024-04-18 DIAGNOSIS — G89.29 CHRONIC PAIN OF RIGHT KNEE: ICD-10-CM

## 2024-04-18 RX ORDER — TRAMADOL HYDROCHLORIDE 50 MG/1
50 TABLET ORAL EVERY 6 HOURS PRN
Qty: 120 TABLET | Refills: 0 | OUTPATIENT
Start: 2024-04-18

## 2024-04-18 NOTE — TELEPHONE ENCOUNTER
Please review. Rx failed/no protocol.    Patient's dispense hx: 03/20/2024, 02/22/2024, and 01/24/2024.    Requested Prescriptions   Pending Prescriptions Disp Refills    TRAMADOL 50 MG Oral Tab [Pharmacy Med Name: Tramadol Hydrochloride 50 Mg Tab Unic] 120 tablet 0     Sig: Take 1 tablet (50 mg total) by mouth every 6 (six) hours as needed for Pain.       Controlled Substance Medication Failed - 4/18/2024  9:42 AM        Failed - This medication is a controlled substance - forward to provider to refill             Future Appointments         Provider Department Appt Notes    In 5 days Bran Duffy MD [facility] follow up right knee, mri results, policy informed    In 1 week LMB DEXA RM1; [facility] JOAN RM1 [facility] - [place]     In 3 months Андрей Carpio MD [facility] Weight loss          Recent Outpatient Visits              1 month ago Abscess of abdominal wall    [facility], [address], [place] Davina Angeles PA-C    Office Visit    2 months ago Degenerative tear of medial meniscus of right knee    [facility]    Office Visit    6 months ago Chronic pain of right knee    [facility], [address], [place] Davina Angeles PA-C    Telemedicine    1 year ago Degenerative tear of medial meniscus of right knee    [facility]    [facility] Visit    1 year ago Chronic pain of right knee    [facility], [address], [place] Davina Angeles PA-C    Telemedicine

## 2024-04-19 ENCOUNTER — LAB ENCOUNTER (OUTPATIENT)
Dept: LAB | Age: 44
End: 2024-04-19
Attending: PHYSICIAN ASSISTANT
Payer: COMMERCIAL

## 2024-04-19 ENCOUNTER — OFFICE VISIT (OUTPATIENT)
Dept: FAMILY MEDICINE CLINIC | Facility: CLINIC | Age: 44
End: 2024-04-19
Payer: COMMERCIAL

## 2024-04-19 VITALS
HEART RATE: 101 BPM | SYSTOLIC BLOOD PRESSURE: 168 MMHG | HEIGHT: 65 IN | BODY MASS INDEX: 38.65 KG/M2 | DIASTOLIC BLOOD PRESSURE: 94 MMHG | WEIGHT: 232 LBS

## 2024-04-19 DIAGNOSIS — Z00.00 ROUTINE GENERAL MEDICAL EXAMINATION AT A HEALTH CARE FACILITY: ICD-10-CM

## 2024-04-19 DIAGNOSIS — I10 ESSENTIAL HYPERTENSION: ICD-10-CM

## 2024-04-19 DIAGNOSIS — Z01.419 ENCOUNTER FOR ROUTINE GYNECOLOGICAL EXAMINATION WITH PAPANICOLAOU SMEAR OF CERVIX: ICD-10-CM

## 2024-04-19 DIAGNOSIS — E11.9 TYPE 2 DIABETES MELLITUS WITHOUT COMPLICATION, WITHOUT LONG-TERM CURRENT USE OF INSULIN (HCC): ICD-10-CM

## 2024-04-19 DIAGNOSIS — Z00.00 ROUTINE GENERAL MEDICAL EXAMINATION AT A HEALTH CARE FACILITY: Primary | ICD-10-CM

## 2024-04-19 LAB
ALBUMIN SERPL-MCNC: 4.2 G/DL (ref 3.2–4.8)
ALBUMIN/GLOB SERPL: 1.2 {RATIO} (ref 1–2)
ALP LIVER SERPL-CCNC: 118 U/L
ALT SERPL-CCNC: 32 U/L
ANION GAP SERPL CALC-SCNC: 7 MMOL/L (ref 0–18)
AST SERPL-CCNC: 23 U/L (ref ?–34)
BASOPHILS # BLD AUTO: 0.06 X10(3) UL (ref 0–0.2)
BASOPHILS NFR BLD AUTO: 0.6 %
BILIRUB SERPL-MCNC: 0.2 MG/DL (ref 0.3–1.2)
BUN BLD-MCNC: 11 MG/DL (ref 9–23)
BUN/CREAT SERPL: 14.9 (ref 10–20)
CALCIUM BLD-MCNC: 9.3 MG/DL (ref 8.7–10.4)
CHLORIDE SERPL-SCNC: 108 MMOL/L (ref 98–112)
CHOLEST SERPL-MCNC: 145 MG/DL (ref ?–200)
CO2 SERPL-SCNC: 27 MMOL/L (ref 21–32)
CREAT BLD-MCNC: 0.74 MG/DL
CREAT UR-SCNC: 53 MG/DL
DEPRECATED RDW RBC AUTO: 42.7 FL (ref 35.1–46.3)
EGFRCR SERPLBLD CKD-EPI 2021: 102 ML/MIN/1.73M2 (ref 60–?)
EOSINOPHIL # BLD AUTO: 0.2 X10(3) UL (ref 0–0.7)
EOSINOPHIL NFR BLD AUTO: 1.9 %
ERYTHROCYTE [DISTWIDTH] IN BLOOD BY AUTOMATED COUNT: 13.1 % (ref 11–15)
FASTING PATIENT LIPID ANSWER: NO
FASTING STATUS PATIENT QL REPORTED: NO
GLOBULIN PLAS-MCNC: 3.4 G/DL (ref 2.8–4.4)
GLUCOSE BLD-MCNC: 270 MG/DL (ref 70–99)
HCT VFR BLD AUTO: 40.1 %
HDLC SERPL-MCNC: 44 MG/DL (ref 40–59)
HEMOGLOBIN A1C: 8.7 % (ref 4.3–5.6)
HGB BLD-MCNC: 13.9 G/DL
IMM GRANULOCYTES # BLD AUTO: 0.04 X10(3) UL (ref 0–1)
IMM GRANULOCYTES NFR BLD: 0.4 %
LDLC SERPL CALC-MCNC: 79 MG/DL (ref ?–100)
LYMPHOCYTES # BLD AUTO: 3.56 X10(3) UL (ref 1–4)
LYMPHOCYTES NFR BLD AUTO: 33.4 %
MCH RBC QN AUTO: 32.1 PG (ref 26–34)
MCHC RBC AUTO-ENTMCNC: 34.7 G/DL (ref 31–37)
MCV RBC AUTO: 92.6 FL
MICROALBUMIN UR-MCNC: 2.7 MG/DL
MICROALBUMIN/CREAT 24H UR-RTO: 50.9 UG/MG (ref ?–30)
MONOCYTES # BLD AUTO: 0.76 X10(3) UL (ref 0.1–1)
MONOCYTES NFR BLD AUTO: 7.1 %
NEUTROPHILS # BLD AUTO: 6.04 X10 (3) UL (ref 1.5–7.7)
NEUTROPHILS # BLD AUTO: 6.04 X10(3) UL (ref 1.5–7.7)
NEUTROPHILS NFR BLD AUTO: 56.6 %
NONHDLC SERPL-MCNC: 101 MG/DL (ref ?–130)
OSMOLALITY SERPL CALC.SUM OF ELEC: 303 MOSM/KG (ref 275–295)
PLATELET # BLD AUTO: 302 10(3)UL (ref 150–450)
POTASSIUM SERPL-SCNC: 4.1 MMOL/L (ref 3.5–5.1)
PROT SERPL-MCNC: 7.6 G/DL (ref 5.7–8.2)
RBC # BLD AUTO: 4.33 X10(6)UL
SODIUM SERPL-SCNC: 142 MMOL/L (ref 136–145)
TRIGL SERPL-MCNC: 126 MG/DL (ref 30–149)
TSI SER-ACNC: 1.7 MIU/ML (ref 0.55–4.78)
VIT B12 SERPL-MCNC: 798 PG/ML (ref 211–911)
VLDLC SERPL CALC-MCNC: 20 MG/DL (ref 0–30)
WBC # BLD AUTO: 10.7 X10(3) UL (ref 4–11)

## 2024-04-19 PROCEDURE — 85025 COMPLETE CBC W/AUTO DIFF WBC: CPT

## 2024-04-19 PROCEDURE — 80053 COMPREHEN METABOLIC PANEL: CPT

## 2024-04-19 PROCEDURE — 36415 COLL VENOUS BLD VENIPUNCTURE: CPT

## 2024-04-19 PROCEDURE — 83036 HEMOGLOBIN GLYCOSYLATED A1C: CPT | Performed by: PHYSICIAN ASSISTANT

## 2024-04-19 PROCEDURE — 82607 VITAMIN B-12: CPT

## 2024-04-19 PROCEDURE — 99396 PREV VISIT EST AGE 40-64: CPT | Performed by: PHYSICIAN ASSISTANT

## 2024-04-19 PROCEDURE — 84443 ASSAY THYROID STIM HORMONE: CPT

## 2024-04-19 PROCEDURE — 90471 IMMUNIZATION ADMIN: CPT | Performed by: PHYSICIAN ASSISTANT

## 2024-04-19 PROCEDURE — 90677 PCV20 VACCINE IM: CPT | Performed by: PHYSICIAN ASSISTANT

## 2024-04-19 PROCEDURE — 80061 LIPID PANEL: CPT

## 2024-04-19 RX ORDER — METFORMIN HYDROCHLORIDE 500 MG/1
500 TABLET, EXTENDED RELEASE ORAL 2 TIMES DAILY WITH MEALS
Qty: 180 TABLET | Refills: 3 | Status: SHIPPED | OUTPATIENT
Start: 2024-04-19 | End: 2024-07-18

## 2024-04-19 RX ORDER — SEMAGLUTIDE 0.68 MG/ML
0.25 INJECTION, SOLUTION SUBCUTANEOUS WEEKLY
Qty: 3 ML | Refills: 0 | Status: SHIPPED | OUTPATIENT
Start: 2024-04-19 | End: 2024-05-19

## 2024-04-19 RX ORDER — TRAMADOL HYDROCHLORIDE 50 MG/1
50 TABLET ORAL EVERY 6 HOURS PRN
Qty: 120 TABLET | Refills: 0 | Status: SHIPPED | OUTPATIENT
Start: 2024-04-19

## 2024-04-19 RX ORDER — HYDROCHLOROTHIAZIDE 25 MG/1
25 TABLET ORAL DAILY
Qty: 90 TABLET | Refills: 1 | Status: SHIPPED | OUTPATIENT
Start: 2024-04-19 | End: 2024-07-18

## 2024-04-19 NOTE — TELEPHONE ENCOUNTER
Patient calling ( name and  of patient verified )  asking to have refill today for Tramadol , she is out medication    Allergies reviewed and pharmacy confirmed    Medication has no protocol, med pended for your review/ approval
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
## 2024-04-23 ENCOUNTER — TELEPHONE (OUTPATIENT)
Dept: ORTHOPEDICS CLINIC | Facility: CLINIC | Age: 44
End: 2024-04-23

## 2024-04-23 ENCOUNTER — OFFICE VISIT (OUTPATIENT)
Dept: ORTHOPEDICS CLINIC | Facility: CLINIC | Age: 44
End: 2024-04-23
Payer: COMMERCIAL

## 2024-04-23 DIAGNOSIS — M23.203 DEGENERATIVE TEAR OF MEDIAL MENISCUS OF RIGHT KNEE: Primary | ICD-10-CM

## 2024-04-23 DIAGNOSIS — M25.561 CHRONIC PAIN OF RIGHT KNEE: ICD-10-CM

## 2024-04-23 DIAGNOSIS — G89.29 CHRONIC PAIN OF RIGHT KNEE: ICD-10-CM

## 2024-04-23 DIAGNOSIS — E66.01 CLASS 2 SEVERE OBESITY DUE TO EXCESS CALORIES WITH SERIOUS COMORBIDITY AND BODY MASS INDEX (BMI) OF 37.0 TO 37.9 IN ADULT (HCC): ICD-10-CM

## 2024-04-23 PROCEDURE — 99213 OFFICE O/P EST LOW 20 MIN: CPT | Performed by: ORTHOPAEDIC SURGERY

## 2024-04-23 NOTE — TELEPHONE ENCOUNTER
Ortho Surgery Request  Surgery: Arthroscopy right knee with partial medial meniscectomy      Surgical Assistant: Lakeisha Burns PA-C  Surgery Day Request:   Estimated Procedure Length: 30 minutes  Anesthesia type: General   Position: Supine   Special Needs:[]Mini C-Arm  []Large C-arm  []Nurse Assist []SCD's []Surgical Assistant []Ultrasound []Ultrasound Arnol  Equipment:    Location:[facility]  Post Op Visit Date: 10 to 12 days postop  CPT Code: 42821     ICD Code:  Medical Clearance Needed: Medical  Preadmission Testing Orders:  Anesthesia testing protocols and anesthesia pre op orders will be used  Additional PAT orders:  Pre OP Orders:  Use the prophylactic antibiotic protocol  Additional Pre Op Orders:  PCN Allergy:  ____  If Yes:   Proceed with PCN/Cephalosporin recommend antibiotic as benefit outweighs risk  Admit:  Hospital outpatient surgery  [facility]  No

## 2024-04-23 NOTE — PROGRESS NOTES
NURSING INTAKE COMMENTS:   Chief Complaint   Patient presents with    Test Results     3/12, RT knee MRI. Pain has progressively worse. Pain scale 5/10.        HPI: This 43 year old female presents today for right knee arthroscopy.  Repeat MRI showed the right medial meniscus tear.  The radiologist read it as worse since Less MRI in 2022.  I thought the articular cartilage was in pretty good condition to the medial side of the radiologist felt there was about 50% narrowing.  I agree that the lateral and patellofemoral cartilage was normal.  Her knee was otherwise normal except for the medial meniscus tear in my opinion.    She made appointments with the bariatric center but her first appointment is not until August.  In the meantime we total calorie counting and portion control.  I congratulated her on making that first step.  Her last hemoglobin A1c was in the eights.  Told her she needs to get that down into the sixes and sevens.  I told her the diet would help that as well.    Past Medical History:    Essential hypertension     Past Surgical History:   Procedure Laterality Date    Arthrotomy,open repair meniscus Right     2015     Current Outpatient Medications   Medication Sig Dispense Refill    traMADol 50 MG Oral Tab Take 1 tablet (50 mg total) by mouth every 6 (six) hours as needed for Pain. 120 tablet 0    hydroCHLOROthiazide 25 MG Oral Tab Take 1 tablet (25 mg total) by mouth daily. 90 tablet 1    semaglutide (OZEMPIC, 0.25 OR 0.5 MG/DOSE,) 2 MG/3ML Subcutaneous Solution Pen-injector Inject 0.25 mg into the skin once a week. 3 mL 0    metFORMIN  MG Oral Tablet 24 Hr Take 1 tablet (500 mg total) by mouth 2 (two) times daily with meals. 180 tablet 3    losartan 100 MG Oral Tab Take 1 tablet (100 mg total) by mouth daily. 90 tablet 1     Allergies   Allergen Reactions    Cyclobenzaprine OTHER (SEE COMMENTS)     Pt stated it makes her \"shaky.\"     Family History   Problem Relation Age of Onset    Ovarian  Cancer Mother 49     No family Hx of DVT/PE    Social History     Occupational History    Not on file   Tobacco Use    Smoking status: Some Days     Current packs/day: 0.15     Average packs/day: 0.2 packs/day for 24.0 years (3.6 ttl pk-yrs)     Types: Cigarettes     Passive exposure: Never    Smokeless tobacco: Never   Vaping Use    Vaping status: Never Used   Substance and Sexual Activity    Alcohol use: Yes    Drug use: Never    Sexual activity: Not on file        Review of Systems:  GENERAL: feels generally well, no significant weight loss or weight gain  SKIN: no ulcerated or worrisome skin lesions  EYES:denies blurred vision or double vision  HEENT: denies new nasal congestion, sinus pain or ST  LUNGS: denies shortness of breath  CARDIOVASCULAR: denies chest pain  GI: no hematemesis, no worsening heartburn, no diarrhea  : no dysuria, no blood in urine, no difficulty urinating, no incontinence  MUSCULOSKELETAL: no other musculoskeletal complaints other than in HPI  NEURO: no numbness or tingling, no weakness or balance disorder  PSYCHE: no depression or anxiety  HEMATOLOGIC: no hx of blood dyscrasia, no Hx DVT/PE  ENDOCRINE: no thyroid or diabetes issues  ALL/ASTHMA: no new hx of severe allergy or asthma    Physical Examination:    LMP 03/26/2024 (Approximate)   Constitutional: appears well hydrated, alert and responsive, no acute distress noted  Extremities: Neither knee had asymmetric warmth or effusion.  The skin on the legs was healthy and there was no pitting edema or calf tenderness.  Musculoskeletal: Motion of the right knee was 0 to 125 degrees.  Tenderness on the medial joint line only and not the past or MCL.  Valgus stressing did not bother her.  Patella grind was negative for crepitus and pain at the lateral compartment nontender.  Neurological: Normal motor and sensory bilateral lower extremities.  She denies neuropathy.    Imaging: MRI right knee shows worsening medial meniscus tear.   Radiologist read some chondromalacia to the medial femoral condyle.  There was no bony edema.  The rest of the knee was pretty normal.      No results found.     Lab Results   Component Value Date    WBC 10.7 04/19/2024    HGB 13.9 04/19/2024    .0 04/19/2024      Lab Results   Component Value Date     (H) 04/19/2024    BUN 11 04/19/2024    CREATSERUM 0.74 04/19/2024    GFRNAA 108 03/05/2021    GFRAA 124 03/05/2021        Assessment and Plan:  Diagnoses and all orders for this visit:    Degenerative tear of medial meniscus of right knee    Chronic pain of right knee    Class 2 severe obesity due to excess calories with serious comorbidity and body mass index (BMI) of 37.0 to 37.9 in adult ([facility])        Assessment: Above diagnoses.    Plan: She will continue trying to lose weight through calorie counting portion control pending her first bariatric appointment.    With regard to arthroscopy of the right knee, again we talked about the risk of premature articular cartilage wear after meniscectomy.  She said she needs to do something however and is willing to take that risk.  We talked about the usual risks of surgery such as death, heart attack, stroke, bleeding, infection, blood clot, nerve injury, artery injury, persistent pain despite surgery, and the potential need for partial or full knee replacement after surgery.  She understands that arthroscopy cannot fix arthritis.    She lives with her family so she will not go home to an empty house after surgery.  The plan will be arthroscopy right knee with partial medial meniscectomy.  She will need medical clearance.    Follow Up: No follow-ups on file.    Bran Duffy MD

## 2024-04-26 ENCOUNTER — TELEPHONE (OUTPATIENT)
Dept: ORTHOPEDICS CLINIC | Facility: CLINIC | Age: 44
End: 2024-04-26

## 2024-04-26 NOTE — TELEPHONE ENCOUNTER
Type of surgery:  Arthroscopy Right Knee with Partial Medial Meniscectomy  Date: 5/16/24  Location: [facility]  Medical Clearance:      *Medical: Yes      *Dental:      *Other:  Prior Authorization Status: Pending   Workers Comp:   Medacta/Philip:   [place]: Yes  POV: 5/28/24

## 2024-04-26 NOTE — TELEPHONE ENCOUNTER
Spoke with patient to offer surgery dates. She chose 5/16. She was informed that she must have medical clearance for this surgery,

## 2024-04-30 ENCOUNTER — TELEPHONE (OUTPATIENT)
Dept: FAMILY MEDICINE CLINIC | Facility: CLINIC | Age: 44
End: 2024-04-30

## 2024-04-30 NOTE — TELEPHONE ENCOUNTER
Prior auth for [employer] scripts, no address    CHAO [id number]  Name Dawson  [facility] 1980

## 2024-04-30 NOTE — TELEPHONE ENCOUNTER
Tasia Dawson (Key: [id number])  Rx #: [id number]  Ozempic (0.25 or 0.5 MG/DOSE) 2MG/3ML pen-injectors    Completed on covermymeds  Information regarding your request  This request has been approved using information available on the patient's profile. CaseId:[id number];    Status:Approved;Review Type:Prior Auth;Coverage Start Date:03/31/2024;Coverage End Date:04/30/2025;

## 2024-05-02 ENCOUNTER — OFFICE VISIT (OUTPATIENT)
Dept: FAMILY MEDICINE CLINIC | Facility: CLINIC | Age: 44
End: 2024-05-02

## 2024-05-02 VITALS
HEIGHT: 65 IN | HEART RATE: 80 BPM | WEIGHT: 233 LBS | BODY MASS INDEX: 38.82 KG/M2 | SYSTOLIC BLOOD PRESSURE: 132 MMHG | DIASTOLIC BLOOD PRESSURE: 81 MMHG

## 2024-05-02 DIAGNOSIS — Z01.818 PRE-OP EXAMINATION: Primary | ICD-10-CM

## 2024-05-02 DIAGNOSIS — M25.561 CHRONIC PAIN OF RIGHT KNEE: ICD-10-CM

## 2024-05-02 DIAGNOSIS — G89.29 CHRONIC PAIN OF RIGHT KNEE: ICD-10-CM

## 2024-05-02 PROCEDURE — 99213 OFFICE O/P EST LOW 20 MIN: CPT | Performed by: PHYSICIAN ASSISTANT

## 2024-05-02 RX ORDER — HYDROCODONE BITARTRATE AND ACETAMINOPHEN 5; 325 MG/1; MG/1
TABLET ORAL
COMMUNITY
Start: 2024-03-10 | End: 2024-05-02

## 2024-05-02 NOTE — PROGRESS NOTES
HPI:     HPI  A 43-year-old female is here in the office for Pre-Op. The patient is scheduled for Arthroscopy right knee with partial medial meniscectomy on 5/16/2024.   The patient denies chest pain, SOB, N/V/C/D, fever, dizziness, syncope, and abdominal pain. There are no other concerns today.    Medications:     Current Outpatient Medications   Medication Sig Dispense Refill    traMADol 50 MG Oral Tab Take 1 tablet (50 mg total) by mouth every 6 (six) hours as needed for Pain. 120 tablet 0    hydroCHLOROthiazide 25 MG Oral Tab Take 1 tablet (25 mg total) by mouth daily. 90 tablet 1    semaglutide (OZEMPIC, 0.25 OR 0.5 MG/DOSE,) 2 MG/3ML Subcutaneous Solution Pen-injector Inject 0.25 mg into the skin once a week. 3 mL 0    metFORMIN  MG Oral Tablet 24 Hr Take 1 tablet (500 mg total) by mouth 2 (two) times daily with meals. 180 tablet 3    losartan 100 MG Oral Tab Take 1 tablet (100 mg total) by mouth daily. 90 tablet 1       Allergies:     Allergies   Allergen Reactions    Cyclobenzaprine OTHER (SEE COMMENTS)     Pt stated it makes her \"shaky.\"       History:     Health Maintenance   Topic Date Due    Diabetes Care Dilated Eye Exam  Never done    Mammogram  10/21/2022    COVID-19 Vaccine (4 - 2023-24 season) 09/01/2023    Annual Depression Screening  Never done    Tobacco Cessation Counseling 1 Year  04/19/2025 (Originally 7/16/1992)    Diabetes Care A1C  07/19/2024    Diabetes Care Foot Exam  04/19/2025    Diabetes Care: GFR  04/19/2025    Diabetes Care: Microalb/Creat Ratio  04/19/2025    Annual Physical  04/19/2025    DTaP,Tdap,and Td Vaccines (3 - Td or Tdap) 11/03/2026    Pap Smear  04/19/2027    Influenza Vaccine  Completed    Pneumococcal Vaccine: Birth to 64yrs  Completed       Patient's last menstrual period was 03/26/2024 (approximate).   Past Medical History:     Past Medical History:    Essential hypertension       Past Surgical History:     Past Surgical History:   Procedure Laterality Date     Arthrotomy,open repair meniscus Right     2015       Family History:     Family History   Problem Relation Age of Onset    Ovarian Cancer Mother 49       Social History:     Social History     Socioeconomic History    Marital status: Single     Spouse name: Not on file    Number of children: Not on file    Years of education: Not on file    Highest education level: Not on file   Occupational History    Not on file   Tobacco Use    Smoking status: Some Days     Current packs/day: 0.15     Average packs/day: 0.2 packs/day for 24.0 years (3.6 ttl pk-yrs)     Types: Cigarettes     Passive exposure: Never    Smokeless tobacco: Never   Vaping Use    Vaping status: Never Used   Substance and Sexual Activity    Alcohol use: Yes    Drug use: Never    Sexual activity: Not on file   Other Topics Concern    Not on file   Social History Narrative    Not on file     Social Determinants of Health     Financial Resource Strain: Not on file   Food Insecurity: No Food Insecurity (2/24/2024)    Received from [facility]    Hunger Vital Sign     Worried About Running Out of Food in the Last Year: Never true     Ran Out of Food in the Last Year: Never true   Transportation Needs: No Transportation Needs (2/24/2024)    Received from [facility]    PRAPARE - Transportation     Lack of Transportation (Medical): No     Lack of Transportation (Non-Medical): No   Physical Activity: Not on file   Stress: Not on file   Social Connections: Not on file   Housing Stability: Unknown (2/24/2024)    Received from [facility]    Housing Stability Vital Sign     Unable to Pay for Housing in the Last Year: No     Number of Places Lived in the Last Year: Not on file     In the last 12 months, was there a time when you did not have a steady place to sleep or slept in a shelter (including now)?: No       Review of  Systems:   Review of Systems   Constitutional: Negative.    HENT: Negative.     Eyes: Negative.    Respiratory: Negative.     Cardiovascular: Negative.    Gastrointestinal: Negative.    Genitourinary: Negative.    Musculoskeletal: Negative.    Skin: Negative.    Neurological: Negative.    Psychiatric/Behavioral: Negative.          Vitals:    05/02/24 1710   BP: 132/81   Pulse: 80   Weight: 233 lb (105.7 kg)   Height: 5' 5\" (1.651 m)     Body mass index is 38.77 kg/m².    Physical Exam:   Physical Exam  Vitals reviewed.   Constitutional:       General: She is not in acute distress.     Appearance: She is well-developed.   HENT:      Head: Normocephalic and atraumatic.      Right Ear: Tympanic membrane, ear canal and external ear normal. There is no impacted cerumen.      Left Ear: Tympanic membrane, ear canal and external ear normal. There is no impacted cerumen.      Nose: Nose normal.      Mouth/Throat:      Mouth: Mucous membranes are moist.      Pharynx: Oropharynx is clear. No oropharyngeal exudate or posterior oropharyngeal erythema.   Eyes:      General:         Right eye: No discharge.         Left eye: No discharge.      Conjunctiva/sclera: Conjunctivae normal.   Cardiovascular:      Rate and Rhythm: Normal rate and regular rhythm.      Heart sounds: S1 normal and S2 normal. Murmur heard.   Pulmonary:      Effort: Pulmonary effort is normal.      Breath sounds: Normal breath sounds. No wheezing or rales.   Chest:      Chest wall: No tenderness.   Abdominal:      General: Abdomen is flat. Bowel sounds are normal. There is no distension.      Palpations: Abdomen is soft.      Tenderness: There is no abdominal tenderness. There is no right CVA tenderness or left CVA tenderness.   Lymphadenopathy:      Cervical: No cervical adenopathy.   Skin:     Findings: No rash.   Neurological:      Mental Status: She is alert and oriented to person, place, and time.   Psychiatric:         Behavior: Behavior is cooperative.           Assessment and Plan::     Problem List Items Addressed This Visit          Musculoskeletal and Injuries    Chronic pain of right knee     Other Visit Diagnoses       Pre-op examination    -  Primary    Relevant Orders    CBC With Differential With Platelet    Comp Metabolic Panel (14)    Hemoglobin A1C    Urinalysis with Culture Reflex    EKG 12 Lead    Cardio Referral - Internal          The patient is scheduled for Arthroscopy right knee with partial medial meniscectomy on 5/16/2024.

## 2024-05-08 ENCOUNTER — EKG ENCOUNTER (OUTPATIENT)
Dept: LAB | Age: 44
End: 2024-05-08
Attending: PHYSICIAN ASSISTANT
Payer: COMMERCIAL

## 2024-05-08 DIAGNOSIS — Z01.818 PRE-OP EXAMINATION: ICD-10-CM

## 2024-05-08 PROCEDURE — 93010 ELECTROCARDIOGRAM REPORT: CPT | Performed by: INTERNAL MEDICINE

## 2024-05-08 PROCEDURE — 93005 ELECTROCARDIOGRAM TRACING: CPT

## 2024-05-09 LAB
ATRIAL RATE: 99 BPM
P AXIS: 20 DEGREES
P-R INTERVAL: 190 MS
Q-T INTERVAL: 342 MS
QRS DURATION: 72 MS
QTC CALCULATION (BEZET): 438 MS
R AXIS: -10 DEGREES
T AXIS: 73 DEGREES
VENTRICULAR RATE: 99 BPM

## 2024-05-10 LAB — HPV I/H RISK 1 DNA SPEC QL NAA+PROBE: NEGATIVE

## 2024-05-13 ENCOUNTER — TELEPHONE (OUTPATIENT)
Dept: ORTHOPEDICS CLINIC | Facility: CLINIC | Age: 44
End: 2024-05-13

## 2024-05-13 NOTE — TELEPHONE ENCOUNTER
Spoke with patient to inform her that we would have to reschedule her surgery if she can't get cardiac clearance in time. I will request for new surgery dates and give her a call back.

## 2024-05-13 NOTE — TELEPHONE ENCOUNTER
Per patient scheduled for cardiology clearance appointment tomorrow, was advised to contact office of Dr. Duffy, was told that results would not be available by 5/15. Please call thank you.

## 2024-05-16 ENCOUNTER — PATIENT MESSAGE (OUTPATIENT)
Dept: FAMILY MEDICINE CLINIC | Facility: CLINIC | Age: 44
End: 2024-05-16

## 2024-05-16 DIAGNOSIS — M25.561 CHRONIC PAIN OF RIGHT KNEE: ICD-10-CM

## 2024-05-16 DIAGNOSIS — G89.29 CHRONIC PAIN OF RIGHT KNEE: ICD-10-CM

## 2024-05-17 ENCOUNTER — TELEPHONE (OUTPATIENT)
Dept: FAMILY MEDICINE CLINIC | Facility: CLINIC | Age: 44
End: 2024-05-17

## 2024-05-17 NOTE — TELEPHONE ENCOUNTER
Routed to Triage support for assistance, thanks.      Requested Prescriptions     Pending Prescriptions Disp Refills    traMADol 50 MG Oral Tab 120 tablet 0     Sig: Take 1 tablet (50 mg total) by mouth every 6 (six) hours as needed for Pain.       Last Office Visit with PCP: 5/2/2024

## 2024-05-17 NOTE — TELEPHONE ENCOUNTER
Spoke with patient and states she received a phone call from Davina Angeles PA-C but did not understand the whole message but to do labs 3 days prior to having a visit. Informed patient since her surgery was rescheduled due to cardiac clearance not being completed in time, she will have to completed another pre-op examination by Bill between 6/11-7/11/24 as it needs to be completed within a 30 day time frame. Patient verbalized understanding and will re-schedule on [facility] as she has her echo scheduled for 6/24/24.

## 2024-05-18 ENCOUNTER — TELEPHONE (OUTPATIENT)
Dept: FAMILY MEDICINE CLINIC | Facility: CLINIC | Age: 44
End: 2024-05-18

## 2024-05-18 DIAGNOSIS — M25.561 CHRONIC PAIN OF RIGHT KNEE: ICD-10-CM

## 2024-05-18 DIAGNOSIS — G89.29 CHRONIC PAIN OF RIGHT KNEE: ICD-10-CM

## 2024-05-18 RX ORDER — TRAMADOL HYDROCHLORIDE 50 MG/1
50 TABLET ORAL EVERY 6 HOURS PRN
Qty: 120 TABLET | Refills: 0 | Status: SHIPPED | OUTPATIENT
Start: 2024-05-18

## 2024-05-18 NOTE — TELEPHONE ENCOUNTER
Patient states she needs refill for Tramadol only 2 pills remaining. Pharmacy [facility] in [place].

## 2024-05-18 NOTE — TELEPHONE ENCOUNTER
Name:SATURDAY TRIAGE RN                      2024 9:46:43 AM  ProfileId:    [id number]                   [facility]  Department:[place] ANSWERING SERVICE  ======================================================================  Text Messages    Message # [id number]         2024 09:45a   [TERRANCE]  To:  SATURDAY TRIAGE RN  From:  FOZIA OCONNOR 78  Primary MD:  Phone#:  [phone]  ----------------------------------------------------------------------  PT NEEDS REFILLE AND STATED IT'S AN EMERGENCY.

## 2024-05-18 NOTE — TELEPHONE ENCOUNTER
Patient would like a refill on Rx tramadol 50MG for 120 tablets. Patient has 2 tablets left. Please advise     Verified pharmacy [facility]       Current Outpatient Medications   Medication Sig Dispense Refill    traMADol 50 MG Oral Tab Take 1 tablet (50 mg total) by mouth every 6 (six) hours as needed for Pain. 120 tablet 0

## 2024-05-20 RX ORDER — TRAMADOL HYDROCHLORIDE 50 MG/1
50 TABLET ORAL EVERY 6 HOURS PRN
Qty: 120 TABLET | Refills: 0
Start: 2024-05-20

## 2024-05-29 DIAGNOSIS — E11.9 TYPE 2 DIABETES MELLITUS WITHOUT COMPLICATION, WITHOUT LONG-TERM CURRENT USE OF INSULIN (HCC): ICD-10-CM

## 2024-05-29 NOTE — TELEPHONE ENCOUNTER
Patient called, verified Name and . States she was told to call if she will be needing refill of Ozempic and also the needles. Medication pended to run through protocol. Verified pharmacy.

## 2024-05-31 RX ORDER — PEN NEEDLE, DIABETIC 30 GX3/16"
1 NEEDLE, DISPOSABLE MISCELLANEOUS WEEKLY
Qty: 30 EACH | Refills: 0 | Status: SHIPPED | OUTPATIENT
Start: 2024-05-31

## 2024-05-31 RX ORDER — SEMAGLUTIDE 0.68 MG/ML
0.5 INJECTION, SOLUTION SUBCUTANEOUS WEEKLY
Qty: 9 ML | Refills: 1 | Status: SHIPPED | OUTPATIENT
Start: 2024-05-31 | End: 2024-08-29

## 2024-05-31 NOTE — TELEPHONE ENCOUNTER
Please review. Rx failed/no protocol.    Patient in need of Ozempic refill and is requesting pen needles. New prescription for pen needles pended for review and approval.    Requested Prescriptions   Pending Prescriptions Disp Refills    semaglutide (OZEMPIC, 0.25 OR 0.5 MG/DOSE,) 2 MG/3ML Subcutaneous Solution Pen-injector 3 mL 0     Sig: Inject 0.25 mg into the skin once a week.       Diabetes Medication Protocol Failed - 5/29/2024  9:42 AM        Failed - Last A1C < 7.5 and within past 6 months     Lab Results   Component Value Date    A1C 8.7 (A) 04/19/2024             Passed - In person appointment or virtual visit in the past 6 mos or appointment in next 3 mos     Recent Outpatient Visits              4 weeks ago Pre-op examination    [facility], [address], [place] Davina Angeles PA-C    Office Visit    1 month ago Degenerative tear of medial meniscus of right knee    [facility] Bran Duffy MD    Office Visit    1 month ago Routine general medical examination at a health care facility    [facility], [address], [place] Davina Angeles PA-C    Office Visit    3 months ago Abscess of abdominal wall    [facility], [address], [place] Davina Angeles PA-C    Office Visit    4 months ago Degenerative tear of medial meniscus of right knee    [facility] Bran Duffy MD    Office Visit          Future Appointments         Provider Department Appt Notes    In 2 weeks Davina Angeles PA-C [facility], [address], [place] Planning for Surgery 7/11    In 3 weeks [facility] DEXA RM1; [facility] JOAN RM1 [facility] - [place]     In 1 month Lakiesha Burns PA [facility] 1st POV Right knee artroscopy 5/16                    Passed - Microalbumin procedure in past 12 months or taking ACE/ARB         Passed - EGFRCR or GFRNAA > 50     GFR Evaluation  EGFRCR: 102 , resulted on 2024          Passed - GFR in the past 12 months          Insulin Pen Needle (PEN NEEDLES) 32G X 4 MM Does not apply Misc 10 each 0     Si Needle once a week. with ozempic       Diabetic Supplies Protocol Passed - 2024  9:42 AM        Passed - In person appointment or virtual visit in the past 12 mos or appointment in next 3 mos     Recent Outpatient Visits              4 weeks ago Pre-op examination    [facility], [address], [place] Davina Angeles PA-C    Office Visit    1 month ago [facility]iscus [facility]    [facility]    1 month ago Routine general medical examination at a health care facility    [facility], [address], [place] Davina Angeles PA-C    Office Visit    3 months ago Abscess of abdominal wall    [facility] [address], [place] Davina Angeles PA-C    Office Visit    4 months ago Degenerative tear of medial meniscus of right knee    [facility]    Office Visit          Future Appointments         Provider Department Appt Notes    In 2 weeks Davina Angeles PA-C [facility], [address], [place] Planning for Surgery     In 3 weeks LMB DEXA RM1; LMB JOAN RM1 [facility] - [place]     In 1 month Lakeisha Burns PA [facility] 1st POV Right knee artroscopy                          Future Appointments         Provider Department Appt Notes    In 2 weeks Davina Angeles PA-C [facility], [address], [place] Planning for Surgery     In 3 weeks LMB DEXA RM1; L[facility] JOAN RM1 [facility] - [place]     In 1 month Lakeisha Burns PA [facility]  [place], [place] 1st POV Right knee artroscopy 5/16          Recent Outpatient Visits              4 weeks ago Pre-op examination    [facility], [place] Nguyen, Minhxuyen, PA-C    Office Visit    1 month ago Degenerative tear of medial meniscus of right knee    [facility], Bran Balderrama MD    Office Visit    1 month ago Routine general medical examination at a health care facility    [facility], [place] Nguyen, Minhxuyen, PA-C    Office Visit    3 months ago Abscess of abdominal wall    [facility], [place] Nguyen, Minhxuyen, PA-C    Office Visit    4 months ago Degenerative tear of medial meniscus of right knee    [facility], Bran Balderrama MD    Office Visit

## 2024-06-17 DIAGNOSIS — G89.29 CHRONIC PAIN OF RIGHT KNEE: ICD-10-CM

## 2024-06-17 DIAGNOSIS — M25.561 CHRONIC PAIN OF RIGHT KNEE: ICD-10-CM

## 2024-06-18 RX ORDER — TRAMADOL HYDROCHLORIDE 50 MG/1
50 TABLET ORAL EVERY 6 HOURS PRN
Qty: 120 TABLET | Refills: 0 | Status: SHIPPED | OUTPATIENT
Start: 2024-06-18

## 2024-06-18 NOTE — TELEPHONE ENCOUNTER
Please review.Protocol failed/ No protocol      Requested Prescriptions   Pending Prescriptions Disp Refills    traMADol 50 MG Oral Tab 120 tablet 0     Sig: Take 1 tablet (50 mg total) by mouth every 6 (six) hours as needed for Pain.       Controlled Substance Medication Failed - 6/18/2024  2:36 PM        Failed - This medication is a controlled substance - forward to provider to refill             Future Appointments         Provider Department Appt Notes    Tomorrow Davina Angeles PA-C [facility], [address], [place] Planning for Surgery 7/11    In 6 days LMB DEXA RM1; L[facility] JOAN RM1 [facility] [place]     In 1 month Lakeisha Burns PA [facility] 1st POV Right knee artroscopy 5/16             Recent Outpatient Visits              1 month ago Pre-op examination    [facility], [address], [place] Davina Angeles PA-C    Office Visit    1 month ago Degenerative tear of medial meniscus of right knee    [facility]    Office Visit    2 months ago Routine general medical examination at a health care facility    [facility], [address], [place] Davina Angeles PA-C    Office Visit    3 months ago Abscess of abdominal wall    [facility] [address], [place] Davina Angeles PA-C    Office Visit    4 months ago Degenerative tear of medial meniscus of right knee    [facility]    Office Visit

## 2024-06-19 ENCOUNTER — OFFICE VISIT (OUTPATIENT)
Dept: FAMILY MEDICINE CLINIC | Facility: CLINIC | Age: 44
End: 2024-06-19

## 2024-06-19 VITALS
SYSTOLIC BLOOD PRESSURE: 124 MMHG | HEART RATE: 99 BPM | BODY MASS INDEX: 36.26 KG/M2 | DIASTOLIC BLOOD PRESSURE: 81 MMHG | WEIGHT: 225.63 LBS | RESPIRATION RATE: 17 BRPM | HEIGHT: 66 IN

## 2024-06-19 DIAGNOSIS — E11.9 TYPE 2 DIABETES MELLITUS WITHOUT COMPLICATION, WITHOUT LONG-TERM CURRENT USE OF INSULIN (HCC): ICD-10-CM

## 2024-06-19 DIAGNOSIS — Z01.818 PRE-OPERATIVE EXAMINATION: Primary | ICD-10-CM

## 2024-06-19 LAB — HEMOGLOBIN A1C: 7.5 % (ref 4.3–5.6)

## 2024-06-19 PROCEDURE — 83036 HEMOGLOBIN GLYCOSYLATED A1C: CPT | Performed by: PHYSICIAN ASSISTANT

## 2024-06-19 PROCEDURE — 99212 OFFICE O/P EST SF 10 MIN: CPT | Performed by: PHYSICIAN ASSISTANT

## 2024-06-19 RX ORDER — TRAMADOL HYDROCHLORIDE 50 MG/1
50 TABLET ORAL EVERY 6 HOURS PRN
Qty: 120 TABLET | Refills: 0 | OUTPATIENT
Start: 2024-06-19

## 2024-06-19 RX ORDER — BLOOD SUGAR DIAGNOSTIC
1 STRIP MISCELLANEOUS DAILY
COMMUNITY
Start: 2024-04-22

## 2024-06-19 RX ORDER — LANCETS
EACH MISCELLANEOUS DAILY
COMMUNITY
Start: 2024-04-22

## 2024-06-19 RX ORDER — BLOOD-GLUCOSE METER
1 EACH MISCELLANEOUS AS DIRECTED
COMMUNITY
Start: 2024-04-22

## 2024-06-19 NOTE — PROGRESS NOTES
HPI:     HPI  A 43-year-old female is in the office for Pre-Op. The patient is scheduled for Arthroscopy right knee with partial medial meniscectomy on 07/11/2024.   The patient denies chest pain, SOB, N/V/C/D, fever, dizziness, syncope, and abdominal pain. There are no other concerns today.    Medications:     Current Outpatient Medications   Medication Sig Dispense Refill    Accu-Chek Softclix Lancets Does not apply Misc daily.      Blood Glucose Monitoring Suppl (ACCU-CHEK GUIDE) w/Device Does not apply Kit Take 1 Bottle by mouth As Directed.      Glucose Blood (ACCU-CHEK GUIDE) In Vitro Strip 1 Lancet by Finger stick route daily.      traMADol 50 MG Oral Tab Take 1 tablet (50 mg total) by mouth every 6 (six) hours as needed for Pain. 120 tablet 0    semaglutide (OZEMPIC, 0.25 OR 0.5 MG/DOSE,) 2 MG/3ML Subcutaneous Solution Pen-injector Inject 0.5 mg into the skin once a week. 9 mL 1    Insulin Pen Needle (PEN NEEDLES) 32G X 4 MM Does not apply Misc 1 Needle once a week. with ozempic 30 each 0    hydroCHLOROthiazide 25 MG Oral Tab Take 1 tablet (25 mg total) by mouth daily. 90 tablet 1    metFORMIN  MG Oral Tablet 24 Hr Take 1 tablet (500 mg total) by mouth 2 (two) times daily with meals. (Patient taking differently: Take 2 tablets (1,000 mg total) by mouth 2 (two) times daily with meals.) 180 tablet 3       Allergies:     Allergies   Allergen Reactions    Cyclobenzaprine OTHER (SEE COMMENTS)     Pt stated it makes her \"shaky.\"    Raisins RASH       History:     Health Maintenance   Topic Date Due    Diabetes Care Dilated Eye Exam  Never done    Mammogram  10/21/2022    COVID-19 Vaccine (4 - 2023-24 season) 09/01/2023    Annual Depression Screening  Never done    Tobacco Cessation Counseling  Never done    Diabetes Care A1C  07/19/2024    Diabetes Care Foot Exam  04/19/2025    Diabetes Care: GFR  04/19/2025    Diabetes Care: Microalb/Creat Ratio  04/19/2025    Annual Physical  04/19/2025    DTaP,Tdap,and Td  Vaccines (3 - Td or Tdap) 11/03/2026    Pap Smear  04/19/2027    Influenza Vaccine  Completed    Pneumococcal Vaccine: Birth to 64yrs  Completed       Patient's last menstrual period was 04/26/2024 (approximate).   Past Medical History:     Past Medical History:    Diabetes (HCC)    Essential hypertension    High blood pressure    Osteoarthritis       Past Surgical History:     Past Surgical History:   Procedure Laterality Date    Arthrotomy,open repair meniscus Right     2015       Family History:     Family History   Problem Relation Age of Onset    Hypertension Father     Lipids Father     Diabetes Father     Hypertension Mother     Lipids Mother     Diabetes Mother     Ovarian Cancer Mother 49       Social History:     Social History     Socioeconomic History    Marital status: Single     Spouse name: Not on file    Number of children: Not on file    Years of education: Not on file    Highest education level: Not on file   Occupational History    Not on file   Tobacco Use    Smoking status: Some Days     Current packs/day: 0.15     Average packs/day: 0.2 packs/day for 24.0 years (3.6 ttl pk-yrs)     Types: Cigarettes     Passive exposure: Never    Smokeless tobacco: Never   Vaping Use    Vaping status: Never Used   Substance and Sexual Activity    Alcohol use: Not Currently    Drug use: Never    Sexual activity: Not on file   Other Topics Concern    Not on file   Social History Narrative    Not on file     Social Determinants of Health     Financial Resource Strain: Not on file   Food Insecurity: No Food Insecurity (2/24/2024)    Received from [facility]    Hunger Vital Sign     Worried About Running Out of Food in the Last Year: Never true     Ran Out of Food in the Last Year: Never true   Transportation Needs: No Transportation Needs (2/24/2024)    Received from [facility]    PRAPARE - Transportation     Lack of  Transportation (Medical): No     Lack of Transportation (Non-Medical): No   Physical Activity: Not on file   Stress: Not on file   Social Connections: Not on file   Housing Stability: Unknown (2/24/2024)    [facility]    Housing Stability Vital Sign     Unable to Pay for Housing in the Last Year: No     Number of Places Lived in the Last Year: Not on file     Unstable Housing in the Last Year: No       Review of Systems:   Review of Systems   Constitutional:  Negative for activity change, chills, fatigue and fever.   HENT:  Negative for congestion, ear discharge, ear pain, postnasal drip, rhinorrhea, sinus pressure, sinus pain and sore throat.    Respiratory:  Negative for cough, chest tightness, shortness of breath and wheezing.    Cardiovascular:  Negative for chest pain and palpitations.   Gastrointestinal:  Negative for abdominal distention, abdominal pain, blood in stool, constipation, diarrhea, nausea and vomiting.   Skin:  Negative for rash.        Vitals:    06/19/24 1149   BP: 124/81   Pulse: 99   Resp: 17   Weight: 225 lb 9.6 oz (102.3 kg)   Height: 5' 6\" (1.676 m)     Body mass index is 36.41 kg/m².    Physical Exam:   Physical Exam  Vitals reviewed.   Constitutional:       General: She is not in acute distress.     Appearance: She is well-developed.   HENT:      Head: Normocephalic and atraumatic.      Right Ear: Tympanic membrane, ear canal and external ear normal. There is no impacted cerumen.      Left Ear: Tympanic membrane, ear canal and external ear normal. There is no impacted cerumen.      Nose: Nose normal.      Mouth/Throat:      Mouth: Mucous membranes are moist.      Pharynx: Oropharynx is clear. No oropharyngeal exudate or posterior oropharyngeal erythema.   Eyes:      General:         Right eye: No discharge.         Left eye: No discharge.      Conjunctiva/sclera: Conjunctivae normal.   Cardiovascular:      Rate and Rhythm:  Normal rate and regular rhythm.      Heart sounds: Normal heart sounds, S1 normal and S2 normal. No murmur heard.  Pulmonary:      Effort: Pulmonary effort is normal.      Breath sounds: Normal breath sounds. No wheezing or rales.   Chest:      Chest wall: No tenderness.   Abdominal:      General: Abdomen is flat. Bowel sounds are normal. There is no distension.      Palpations: Abdomen is soft.      Tenderness: There is no abdominal tenderness. There is no right CVA tenderness or left CVA tenderness.   Lymphadenopathy:      Cervical: No cervical adenopathy.   Skin:     Findings: No rash.   Neurological:      Mental Status: She is alert and oriented to person, place, and time.   Psychiatric:         Mood and Affect: Mood normal.         Behavior: Behavior normal. Behavior is cooperative.         Thought Content: Thought content normal.         Judgment: Judgment normal.        Assessment and Plan::     Problem List Items Addressed This Visit          HCC Problems    Type 2 diabetes mellitus without complication, without long-term current use of insulin (HCC)    Relevant Orders    POC Glycohemoglobin [09807] (Completed)     Other Visit Diagnoses       Pre-operative examination    -  Primary        The patient is scheduled for Arthroscopy right knee with partial medial meniscectomy on 07/11/2024.   Await for cardio clearance.

## 2024-06-20 ENCOUNTER — TELEPHONE (OUTPATIENT)
Dept: ORTHOPEDICS CLINIC | Facility: CLINIC | Age: 44
End: 2024-06-20

## 2024-06-20 NOTE — TELEPHONE ENCOUNTER
Received FMLA forms via fax from [employer]. Forms sent to forms department via email  and mail.

## 2024-06-25 NOTE — TELEPHONE ENCOUNTER
Disability forms received. [employer] message sent for Authorization/Release of Information. Logged for processing.

## 2024-06-28 NOTE — TELEPHONE ENCOUNTER
Dr. Duffy     *The ACKNOWLEDGE button has been moved to the top right ribbon*    Please sign off on form if you agree to: Disability   Start 07/11/24 End 09/11/24  (place your signature on the first page only)    -From your Inbasket, Highlight the patient and click Chart   -Double click the 06/20/2024 Forms Completion telephone encounter  -Scroll down to the Media section   -Click the blue Hyperlink: Karson Duffy 06/28/24  -Click Acknowledge located in the top right ribbon/menu   -Drag the mouse into the blank space of the document and a + sign will appear. Left click to   electronically sign the document.     Thank you,     Anastasiya

## 2024-07-08 ENCOUNTER — LAB ENCOUNTER (OUTPATIENT)
Dept: LAB | Age: 44
End: 2024-07-08
Attending: PHYSICIAN ASSISTANT
Payer: COMMERCIAL

## 2024-07-08 ENCOUNTER — MED REC SCAN ONLY (OUTPATIENT)
Dept: FAMILY MEDICINE CLINIC | Facility: CLINIC | Age: 44
End: 2024-07-08

## 2024-07-08 ENCOUNTER — TELEPHONE (OUTPATIENT)
Dept: FAMILY MEDICINE CLINIC | Facility: CLINIC | Age: 44
End: 2024-07-08

## 2024-07-08 DIAGNOSIS — Z01.818 PRE-OP EXAMINATION: ICD-10-CM

## 2024-07-08 LAB
ALBUMIN SERPL-MCNC: 4.4 G/DL (ref 3.2–4.8)
ALBUMIN/GLOB SERPL: 1.4 {RATIO} (ref 1–2)
ALP LIVER SERPL-CCNC: 81 U/L
ALT SERPL-CCNC: 18 U/L
ANION GAP SERPL CALC-SCNC: 5 MMOL/L (ref 0–18)
AST SERPL-CCNC: 19 U/L (ref ?–34)
BASOPHILS # BLD AUTO: 0.05 X10(3) UL (ref 0–0.2)
BASOPHILS NFR BLD AUTO: 0.5 %
BILIRUB SERPL-MCNC: 0.3 MG/DL (ref 0.3–1.2)
BILIRUB UR QL: NEGATIVE
BUN BLD-MCNC: 10 MG/DL (ref 9–23)
BUN/CREAT SERPL: 15.4 (ref 10–20)
CALCIUM BLD-MCNC: 9 MG/DL (ref 8.7–10.4)
CHLORIDE SERPL-SCNC: 103 MMOL/L (ref 98–112)
CLARITY UR: CLEAR
CO2 SERPL-SCNC: 31 MMOL/L (ref 21–32)
CREAT BLD-MCNC: 0.65 MG/DL
DEPRECATED RDW RBC AUTO: 43.4 FL (ref 35.1–46.3)
EGFRCR SERPLBLD CKD-EPI 2021: 111 ML/MIN/1.73M2 (ref 60–?)
EOSINOPHIL # BLD AUTO: 0.17 X10(3) UL (ref 0–0.7)
EOSINOPHIL NFR BLD AUTO: 1.6 %
ERYTHROCYTE [DISTWIDTH] IN BLOOD BY AUTOMATED COUNT: 13.7 % (ref 11–15)
EST. AVERAGE GLUCOSE BLD GHB EST-MCNC: 171 MG/DL (ref 68–126)
FASTING STATUS PATIENT QL REPORTED: YES
GLOBULIN PLAS-MCNC: 3.1 G/DL (ref 2–3.5)
GLUCOSE BLD-MCNC: 147 MG/DL (ref 70–99)
GLUCOSE UR-MCNC: NORMAL MG/DL
HBA1C MFR BLD: 7.6 % (ref ?–5.7)
HCT VFR BLD AUTO: 36.3 %
HGB BLD-MCNC: 13.2 G/DL
HGB UR QL STRIP.AUTO: NEGATIVE
IMM GRANULOCYTES # BLD AUTO: 0.05 X10(3) UL (ref 0–1)
IMM GRANULOCYTES NFR BLD: 0.5 %
KETONES UR-MCNC: NEGATIVE MG/DL
LEUKOCYTE ESTERASE UR QL STRIP.AUTO: 75
LYMPHOCYTES # BLD AUTO: 3.66 X10(3) UL (ref 1–4)
LYMPHOCYTES NFR BLD AUTO: 34.7 %
MCH RBC QN AUTO: 34.2 PG (ref 26–34)
MCHC RBC AUTO-ENTMCNC: 36.4 G/DL (ref 31–37)
MCV RBC AUTO: 94 FL
MONOCYTES # BLD AUTO: 0.68 X10(3) UL (ref 0.1–1)
MONOCYTES NFR BLD AUTO: 6.4 %
NEUTROPHILS # BLD AUTO: 5.95 X10 (3) UL (ref 1.5–7.7)
NEUTROPHILS # BLD AUTO: 5.95 X10(3) UL (ref 1.5–7.7)
NEUTROPHILS NFR BLD AUTO: 56.3 %
NITRITE UR QL STRIP.AUTO: NEGATIVE
OSMOLALITY SERPL CALC.SUM OF ELEC: 290 MOSM/KG (ref 275–295)
PH UR: 6 [PH] (ref 5–8)
PLATELET # BLD AUTO: 323 10(3)UL (ref 150–450)
POTASSIUM SERPL-SCNC: 4 MMOL/L (ref 3.5–5.1)
PROT SERPL-MCNC: 7.5 G/DL (ref 5.7–8.2)
PROT UR-MCNC: NEGATIVE MG/DL
RBC # BLD AUTO: 3.86 X10(6)UL
SODIUM SERPL-SCNC: 139 MMOL/L (ref 136–145)
SP GR UR STRIP: 1.01 (ref 1–1.03)
UROBILINOGEN UR STRIP-ACNC: NORMAL
WBC # BLD AUTO: 10.6 X10(3) UL (ref 4–11)

## 2024-07-08 PROCEDURE — 87186 SC STD MICRODIL/AGAR DIL: CPT

## 2024-07-08 PROCEDURE — 87088 URINE BACTERIA CULTURE: CPT

## 2024-07-08 PROCEDURE — 81001 URINALYSIS AUTO W/SCOPE: CPT

## 2024-07-08 PROCEDURE — 80053 COMPREHEN METABOLIC PANEL: CPT

## 2024-07-08 PROCEDURE — 36415 COLL VENOUS BLD VENIPUNCTURE: CPT

## 2024-07-08 PROCEDURE — 83036 HEMOGLOBIN GLYCOSYLATED A1C: CPT

## 2024-07-08 PROCEDURE — 85025 COMPLETE CBC W/AUTO DIFF WBC: CPT

## 2024-07-08 PROCEDURE — 87086 URINE CULTURE/COLONY COUNT: CPT

## 2024-07-08 NOTE — TELEPHONE ENCOUNTER
Patient came into [place] office and completed Release of Information.   Form sent to Forms Dept.

## 2024-07-09 ENCOUNTER — TELEPHONE (OUTPATIENT)
Dept: FAMILY MEDICINE CLINIC | Facility: CLINIC | Age: 44
End: 2024-07-09

## 2024-07-09 NOTE — TELEPHONE ENCOUNTER
Called patient advised her of message and instructions below:        Urine culture showed UTI. Await for final urine culture. The patient needs to rechedule for surgery.

## 2024-07-17 ENCOUNTER — TELEPHONE (OUTPATIENT)
Dept: FAMILY MEDICINE CLINIC | Facility: CLINIC | Age: 44
End: 2024-07-17

## 2024-07-17 DIAGNOSIS — M25.561 CHRONIC PAIN OF RIGHT KNEE: ICD-10-CM

## 2024-07-17 DIAGNOSIS — G89.29 CHRONIC PAIN OF RIGHT KNEE: ICD-10-CM

## 2024-07-17 RX ORDER — TRAMADOL HYDROCHLORIDE 50 MG/1
50 TABLET ORAL EVERY 6 HOURS PRN
Qty: 120 TABLET | Refills: 0 | Status: SHIPPED | OUTPATIENT
Start: 2024-07-17

## 2024-07-17 NOTE — TELEPHONE ENCOUNTER
Please review refill failed/no protocol - patient will be out of medication by tomorrow morning     Requested Prescriptions     Pending Prescriptions Disp Refills    TRAMADOL 50 MG Oral Tab [Pharmacy Med Name: Tramadol Hydrochloride 50 Mg Tab Unic] 120 tablet 0     Sig: Take 1 tablet (50 mg total) by mouth every 6 (six) hours as needed for Pain.         Recent Visits  Date Type Provider Dept   06/19/24 Office Visit Davina Angeles PA-C [facility]   05/02/24 Office Visit Davina Angeles PA-C [facility]   04/19/24 Office Visit Davina Angeles PA-C [facility]   02/21/24 Office Visit Davina Angeles PA-C [facility]   Showing recent visits within past 540 days with a meds authorizing provider and meeting all other requirements  Future Appointments  No visits were found meeting these conditions.  Showing future appointments within next 150 days with a meds authorizing provider and meeting all other requirements    Requested Prescriptions   Pending Prescriptions Disp Refills    TRAMADOL 50 MG Oral Tab [Pharmacy Med Name: Tramadol Hydrochloride 50 Mg Tab Unic] 120 tablet 0     Sig: Take 1 tablet (50 mg total) by mouth every 6 (six) hours as needed for Pain.       Controlled Substance Medication Failed - 7/17/2024  9:38 AM        Failed - This medication is a controlled substance - forward to provider to refill

## 2024-07-17 NOTE — TELEPHONE ENCOUNTER
Patient asking about follow up urine test after completing antibiotic. Order is in system, patient will complete tomorrow.

## 2024-07-18 ENCOUNTER — OFFICE VISIT (OUTPATIENT)
Dept: FAMILY MEDICINE CLINIC | Facility: CLINIC | Age: 44
End: 2024-07-18

## 2024-07-18 VITALS
HEART RATE: 86 BPM | HEIGHT: 66 IN | DIASTOLIC BLOOD PRESSURE: 84 MMHG | SYSTOLIC BLOOD PRESSURE: 135 MMHG | WEIGHT: 228 LBS | BODY MASS INDEX: 36.64 KG/M2

## 2024-07-18 DIAGNOSIS — M25.561 CHRONIC PAIN OF RIGHT KNEE: ICD-10-CM

## 2024-07-18 DIAGNOSIS — Z01.818 PRE-OPERATIVE EXAMINATION: Primary | ICD-10-CM

## 2024-07-18 DIAGNOSIS — E11.9 TYPE 2 DIABETES MELLITUS WITHOUT COMPLICATION, WITHOUT LONG-TERM CURRENT USE OF INSULIN (HCC): ICD-10-CM

## 2024-07-18 DIAGNOSIS — G89.29 CHRONIC PAIN OF RIGHT KNEE: ICD-10-CM

## 2024-07-18 NOTE — PROGRESS NOTES
HPI:     HPI  A 43-year-old female is in the office for Pre-Op. The patient is scheduled for Arthroscopy right knee with partial medial meniscectomy on 08/09/2024.   The patient denies chest pain, SOB, N/V/C/D, fever, dizziness, syncope, and abdominal pain. There are no other concerns today.  Medications:     Current Outpatient Medications   Medication Sig Dispense Refill    TRAMADOL 50 MG Oral Tab Take 1 tablet (50 mg total) by mouth every 6 (six) hours as needed for Pain. 120 tablet 0    semaglutide (OZEMPIC, 0.25 OR 0.5 MG/DOSE,) 2 MG/3ML Subcutaneous Solution Pen-injector Inject 0.5 mg into the skin once a week. 9 mL 1    hydroCHLOROthiazide 25 MG Oral Tab Take 1 tablet (25 mg total) by mouth daily. 90 tablet 1    metFORMIN  MG Oral Tablet 24 Hr Take 1 tablet (500 mg total) by mouth 2 (two) times daily with meals. (Patient taking differently: Take 2 tablets (1,000 mg total) by mouth 2 (two) times daily with meals.) 180 tablet 3       Allergies:     Allergies   Allergen Reactions    Cyclobenzaprine OTHER (SEE COMMENTS)     Pt stated it makes her \"shaky.\"    Raisins RASH       History:     Health Maintenance   Topic Date Due    Diabetes Care Dilated Eye Exam  Never done    Colorectal Cancer Screening  Never done    Mammogram  10/21/2022    COVID-19 Vaccine (4 - 2023-24 season) 09/01/2023    Annual Depression Screening  Never done    Tobacco Cessation Counseling  Never done    Influenza Vaccine (1) 10/01/2024    Diabetes Care A1C  01/08/2025    Diabetes Care Foot Exam  04/19/2025    Diabetes Care: Microalb/Creat Ratio  04/19/2025    Annual Physical  04/19/2025    Diabetes Care: GFR  07/08/2025    DTaP,Tdap,and Td Vaccines (3 - Td or Tdap) 11/03/2026    Pap Smear  04/19/2027    Pneumococcal Vaccine: Birth to 64yrs  Completed       Patient's last menstrual period was 06/30/2024 (approximate).   Past Medical History:     Past Medical History:    Diabetes (HCC)    Essential hypertension    High blood pressure     Osteoarthritis       Past Surgical History:     Past Surgical History:   Procedure Laterality Date    Arthrotomy,open repair meniscus Right     2015    Other surgical history      Abdominal Abscess surgery       Family History:     Family History   Problem Relation Age of Onset    Hypertension Father     Lipids Father     Diabetes Father     Hypertension Mother     Lipids Mother     Diabetes Mother     Ovarian Cancer Mother 49       Social History:     Social History     Socioeconomic History    Marital status: Single     Spouse name: Not on file    Number of children: Not on file    Years of education: Not on file    Highest education level: Not on file   Occupational History    Not on file   Tobacco Use    Smoking status: Some Days     Current packs/day: 0.15     Average packs/day: 0.2 packs/day for 24.0 years (3.6 ttl pk-yrs)     Types: Cigarettes     Passive exposure: Never    Smokeless tobacco: Never   Vaping Use    Vaping status: Never Used   Substance and Sexual Activity    Alcohol use: Not Currently     Comment: occassion    Drug use: Never    Sexual activity: Not on file   Other Topics Concern    Not on file   Social History Narrative    Not on file     Social Determinants of Health     Financial Resource Strain: Not on file   Food Insecurity: No Food Insecurity (2/24/2024)    [facility]    Hunger Vital Sign     Worried About Running Out of Food in the Last Year: Never true     Ran Out of Food in the Last Year: Never true   Transportation Needs: No Transportation Needs (2/24/2024)    Received from [facility], [facility]    PRAPARE - Transportation     Lack of Transportation (Medical): No     Lack of Transportation (Non-Medical): No   Physical Activity: Not on file   Stress: Not on file   Social Connections: Not on file   Housing Stability: Unknown (2/24/2024)    Received from [facility],  [facility]    Housing Stability Vital Sign     Unable to Pay for Housing in the Last Year: No     Number of Places Lived in the Last Year: Not on file     Unstable Housing in the Last Year: No       Review of Systems:   Review of Systems   Constitutional: Negative.  Negative for activity change, chills, fatigue and fever.   HENT: Negative.  Negative for congestion, ear discharge, ear pain, postnasal drip, rhinorrhea, sinus pressure, sinus pain and sore throat.    Eyes: Negative.    Respiratory: Negative.  Negative for cough, chest tightness, shortness of breath and wheezing.    Cardiovascular: Negative.  Negative for chest pain and palpitations.   Gastrointestinal: Negative.  Negative for abdominal distention, abdominal pain, blood in stool, constipation, diarrhea, nausea and vomiting.   Genitourinary: Negative.    Musculoskeletal: Negative.    Skin: Negative.  Negative for rash.   Neurological: Negative.    Psychiatric/Behavioral: Negative.          Vitals:    07/18/24 1558   BP: 135/84   Pulse: 86   Weight: 228 lb (103.4 kg)   Height: 5' 6\" (1.676 m)     Body mass index is 36.8 kg/m².    Physical Exam:   Physical Exam  Vitals reviewed.   Constitutional:       General: She is not in acute distress.     Appearance: She is well-developed.   HENT:      Head: Normocephalic and atraumatic.      Right Ear: Tympanic membrane, ear canal and external ear normal. There is no impacted cerumen.      Left Ear: Tympanic membrane, ear canal and external ear normal. There is no impacted cerumen.      Nose: Nose normal.      Mouth/Throat:      Mouth: Mucous membranes are moist.      Pharynx: Oropharynx is clear. No oropharyngeal exudate or posterior oropharyngeal erythema.   Eyes:      General:         Right eye: No discharge.         Left eye: No discharge.      Conjunctiva/sclera: Conjunctivae normal.   Cardiovascular:      Rate and Rhythm: Normal rate and regular rhythm.      Heart sounds: S1 normal and S2  normal. Murmur heard.   Pulmonary:      Effort: Pulmonary effort is normal.      Breath sounds: Normal breath sounds. No wheezing or rales.   Chest:      Chest wall: No tenderness.   Abdominal:      General: Abdomen is flat. Bowel sounds are normal. There is no distension.      Tenderness: There is no abdominal tenderness. There is no right CVA tenderness or left CVA tenderness.   Lymphadenopathy:      Cervical: No cervical adenopathy.   Skin:     Findings: No rash.   Neurological:      Mental Status: She is alert and oriented to person, place, and time.   Psychiatric:         Behavior: Behavior is cooperative.          Assessment and Plan::     Problem List Items Addressed This Visit          HCC Problems    Type 2 diabetes mellitus without complication, without long-term current use of insulin (HCC)    Relevant Orders    Hemoglobin A1C       Musculoskeletal and Injuries    Chronic pain of right knee     Other Visit Diagnoses       Pre-operative examination    -  Primary    Relevant Orders    CBC With Differential With Platelet    Comp Metabolic Panel (14)    Prothrombin Time (PT)    PTT, Activated [E]    Urinalysis with Culture Reflex          The patient is scheduled for Arthroscopy right knee with partial medial meniscectomy on 08/09/2024.     The patient is cleared for surgery.

## 2024-07-21 ENCOUNTER — TELEPHONE (OUTPATIENT)
Dept: ORTHOPEDICS CLINIC | Facility: CLINIC | Age: 44
End: 2024-07-21

## 2024-07-21 NOTE — TELEPHONE ENCOUNTER
Disability received, sent patient [email or web address] message for Release of Information / Form Completion Request completion

## 2024-07-26 NOTE — TELEPHONE ENCOUNTER
Hi Dr. Duffy,     *The ACKNOWLEDGE button has been moved to the top right ribbon*    Please sign off on form if you agree to: disability from 8/7/24 to 4-6 weeks pending post-op due to right knee arthroscopy w/partial medial meniscectomy sx 8/9/24  (place your signature on the first page only)    -From your Inbasket, Highlight the patient and click Chart   -Double click the 7/21/24 Forms Completion telephone encounter  -Scroll down to the Media section   -Click the blue Hyperlink: Disability, Dr. Duffy, 7/26/24    -Click Acknowledge located in the top right ribbon/menu   -Drag the mouse into the blank space of the document and a + sign will appear. Left click to   electronically sign the document.     Thank you,  Josy ARMIJO

## 2024-07-26 NOTE — TELEPHONE ENCOUNTER
Type of Leave: Continuous  Reason for Leave: right knee arthroscopy sx   Start date of leave: 8/7/24  How much time needed?: 4-6 weeks  Forms Due Date :asap  Was Fee and Turnaround info Given?: yes

## 2024-07-29 NOTE — TELEPHONE ENCOUNTER
Forms completed and uploaded to [employer] due to incomplete Release of Information. Sent [employer] message again for patient to complete.

## 2024-08-01 ENCOUNTER — LAB ENCOUNTER (OUTPATIENT)
Dept: LAB | Age: 44
End: 2024-08-01
Attending: PHYSICIAN ASSISTANT
Payer: COMMERCIAL

## 2024-08-01 DIAGNOSIS — Z01.818 PRE-OPERATIVE EXAMINATION: ICD-10-CM

## 2024-08-01 DIAGNOSIS — R30.0 DYSURIA: ICD-10-CM

## 2024-08-01 DIAGNOSIS — E11.9 TYPE 2 DIABETES MELLITUS WITHOUT COMPLICATION, WITHOUT LONG-TERM CURRENT USE OF INSULIN (HCC): ICD-10-CM

## 2024-08-01 LAB
ALBUMIN SERPL-MCNC: 4.4 G/DL (ref 3.2–4.8)
ALBUMIN/GLOB SERPL: 1.3 {RATIO} (ref 1–2)
ALP LIVER SERPL-CCNC: 79 U/L
ALT SERPL-CCNC: 21 U/L
ANION GAP SERPL CALC-SCNC: 6 MMOL/L (ref 0–18)
APTT PPP: 33.9 SECONDS (ref 23–36)
AST SERPL-CCNC: 19 U/L (ref ?–34)
BASOPHILS # BLD AUTO: 0.05 X10(3) UL (ref 0–0.2)
BASOPHILS NFR BLD AUTO: 0.4 %
BILIRUB SERPL-MCNC: 0.3 MG/DL (ref 0.3–1.2)
BILIRUB UR QL: NEGATIVE
BUN BLD-MCNC: 9 MG/DL (ref 9–23)
BUN/CREAT SERPL: 14.1 (ref 10–20)
CALCIUM BLD-MCNC: 9.4 MG/DL (ref 8.7–10.4)
CHLORIDE SERPL-SCNC: 102 MMOL/L (ref 98–112)
CO2 SERPL-SCNC: 30 MMOL/L (ref 21–32)
CREAT BLD-MCNC: 0.64 MG/DL
DEPRECATED RDW RBC AUTO: 43.8 FL (ref 35.1–46.3)
EGFRCR SERPLBLD CKD-EPI 2021: 111 ML/MIN/1.73M2 (ref 60–?)
EOSINOPHIL # BLD AUTO: 0.23 X10(3) UL (ref 0–0.7)
EOSINOPHIL NFR BLD AUTO: 1.9 %
ERYTHROCYTE [DISTWIDTH] IN BLOOD BY AUTOMATED COUNT: 13.4 % (ref 11–15)
EST. AVERAGE GLUCOSE BLD GHB EST-MCNC: 160 MG/DL (ref 68–126)
FASTING STATUS PATIENT QL REPORTED: YES
GLOBULIN PLAS-MCNC: 3.5 G/DL (ref 2–3.5)
GLUCOSE BLD-MCNC: 123 MG/DL (ref 70–99)
GLUCOSE UR-MCNC: NORMAL MG/DL
HBA1C MFR BLD: 7.2 % (ref ?–5.7)
HCT VFR BLD AUTO: 39.1 %
HGB BLD-MCNC: 13.7 G/DL
HGB UR QL STRIP.AUTO: NEGATIVE
IMM GRANULOCYTES # BLD AUTO: 0.08 X10(3) UL (ref 0–1)
IMM GRANULOCYTES NFR BLD: 0.6 %
INR BLD: 1 (ref 0.8–1.2)
KETONES UR-MCNC: NEGATIVE MG/DL
LEUKOCYTE ESTERASE UR QL STRIP.AUTO: 250
LYMPHOCYTES # BLD AUTO: 3.87 X10(3) UL (ref 1–4)
LYMPHOCYTES NFR BLD AUTO: 31.3 %
MCH RBC QN AUTO: 33.3 PG (ref 26–34)
MCHC RBC AUTO-ENTMCNC: 35 G/DL (ref 31–37)
MCV RBC AUTO: 94.9 FL
MONOCYTES # BLD AUTO: 0.69 X10(3) UL (ref 0.1–1)
MONOCYTES NFR BLD AUTO: 5.6 %
NEUTROPHILS # BLD AUTO: 7.44 X10 (3) UL (ref 1.5–7.7)
NEUTROPHILS # BLD AUTO: 7.44 X10(3) UL (ref 1.5–7.7)
NEUTROPHILS NFR BLD AUTO: 60.2 %
OSMOLALITY SERPL CALC.SUM OF ELEC: 286 MOSM/KG (ref 275–295)
PH UR: 5.5 [PH] (ref 5–8)
PLATELET # BLD AUTO: 317 10(3)UL (ref 150–450)
POTASSIUM SERPL-SCNC: 3.8 MMOL/L (ref 3.5–5.1)
PROT SERPL-MCNC: 7.9 G/DL (ref 5.7–8.2)
PROT UR-MCNC: NEGATIVE MG/DL
PROTHROMBIN TIME: 13.8 SECONDS (ref 11.6–14.8)
RBC # BLD AUTO: 4.12 X10(6)UL
SODIUM SERPL-SCNC: 138 MMOL/L (ref 136–145)
SP GR UR STRIP: 1.02 (ref 1–1.03)
UROBILINOGEN UR STRIP-ACNC: NORMAL
WBC # BLD AUTO: 12.4 X10(3) UL (ref 4–11)

## 2024-08-01 PROCEDURE — 85730 THROMBOPLASTIN TIME PARTIAL: CPT

## 2024-08-01 PROCEDURE — 87186 SC STD MICRODIL/AGAR DIL: CPT

## 2024-08-01 PROCEDURE — 83036 HEMOGLOBIN GLYCOSYLATED A1C: CPT

## 2024-08-01 PROCEDURE — 85025 COMPLETE CBC W/AUTO DIFF WBC: CPT

## 2024-08-01 PROCEDURE — 87077 CULTURE AEROBIC IDENTIFY: CPT

## 2024-08-01 PROCEDURE — 36415 COLL VENOUS BLD VENIPUNCTURE: CPT

## 2024-08-01 PROCEDURE — 81001 URINALYSIS AUTO W/SCOPE: CPT

## 2024-08-01 PROCEDURE — 85610 PROTHROMBIN TIME: CPT

## 2024-08-01 PROCEDURE — 80053 COMPREHEN METABOLIC PANEL: CPT

## 2024-08-01 PROCEDURE — 87086 URINE CULTURE/COLONY COUNT: CPT

## 2024-08-02 ENCOUNTER — TELEPHONE (OUTPATIENT)
Dept: FAMILY MEDICINE CLINIC | Facility: CLINIC | Age: 44
End: 2024-08-02

## 2024-08-02 DIAGNOSIS — R82.90 ABNORMAL URINALYSIS: Primary | ICD-10-CM

## 2024-08-02 NOTE — TELEPHONE ENCOUNTER
Urinalysis abnormal patient to come to lab to submit urine for culture today.  Order entered.

## 2024-08-02 NOTE — TELEPHONE ENCOUNTER
Patient called, verified Name and . States she saw MARGI Bower a few weeks ago for pre-op visit. Surgery on  for right knee arthroscopy. Labs were ordered and she completed it yesterday, and wants to know the result. Relayed that test results post immediately to [employer] account. However, provider has not reviewed it yet and we are awaiting for provider review and recommendations. Patient verbalized understanding.     Dr. Escalante (for MARGI Bower - not in the office until ) please advise.

## 2024-08-07 ENCOUNTER — LAB ENCOUNTER (OUTPATIENT)
Dept: LAB | Age: 44
End: 2024-08-07
Attending: PHYSICIAN ASSISTANT
Payer: COMMERCIAL

## 2024-08-07 ENCOUNTER — TELEPHONE (OUTPATIENT)
Dept: FAMILY MEDICINE CLINIC | Facility: CLINIC | Age: 44
End: 2024-08-07

## 2024-08-07 DIAGNOSIS — Z01.818 PRE-OPERATIVE EXAMINATION: ICD-10-CM

## 2024-08-07 LAB
BILIRUB UR QL: NEGATIVE
CLARITY UR: CLEAR
GLUCOSE UR-MCNC: NORMAL MG/DL
HGB UR QL STRIP.AUTO: NEGATIVE
KETONES UR-MCNC: 10 MG/DL
LEUKOCYTE ESTERASE UR QL STRIP.AUTO: NEGATIVE
NITRITE UR QL STRIP.AUTO: NEGATIVE
PH UR: 6 [PH] (ref 5–8)
PROT UR-MCNC: NEGATIVE MG/DL
SP GR UR STRIP: 1.02 (ref 1–1.03)
UROBILINOGEN UR STRIP-ACNC: NORMAL

## 2024-08-07 PROCEDURE — 81003 URINALYSIS AUTO W/O SCOPE: CPT

## 2024-08-07 NOTE — DISCHARGE INSTRUCTIONS
Starting today, work on bending and straightening the knee.  Pump your feet often to try to prevent blood clots.    You can bear full weight on the right leg with your crutches.  You can stop crutches when you feel comfortable and safe without them.  You can go up and down the stairs when you feel safe on them.    No driving unless you have almost no pain and it is not distracting and have stopped taking tramadol.    When you are not walking, you should have your leg elevated.     Leave bandage on and dry for 2 days. May remove on Sunday .   Sponge bath only until dressing is removed.    On Sunday once dressing is removed you may take a regular shower.    Wash wounds gently with soap and water using your hands. Pat dry.  Do not soak.  Apply bacitracin or triple antibiotic ointment and cover with 3 clean Band-Aids.    Ice for 20 minutes at a time.  Once the big bandage is off, use a small washcloth or towel between your skin and the ice.    Take medications as prescribed on discharge instructions.      Call to make an appointment with Dr. Duffy's office for suture removal in 10-14 days.       HOME INSTRUCTIONS  [facility] HOME CARE INSTRUCTIONS: POST-OP ANESTHESIA  The medication that you received for sedation or general anesthesia can last up to 24 hours. Your judgment and reflexes may be altered, even if you feel like your normal self.      We Recommend:   Do not drive any motor vehicle or bicycle   Avoid mowing the lawn, playing sports, or working with power tools/applicances (power saws, electric knives or mixers)   That you have someone stay with you on your first night home   Do not drink alcohol or take sleeping pills or tranquilizers   Do not sign legal documents within 24 hours of your procedure   If you had a nerve block for your surgery, take extra care not to put any pressure on your arm or hand for 24 hours    It is normal:  For you to have a sore throat if you had a breathing tube during surgery (while  you were asleep!). The sore throat should get better within 48 hours. You can gargle with warm salt water (1/2 tsp in 4 oz warm water) or use a throat lozenge for comfort  To feel muscle aches or soreness especially in the abdomen, chest or neck. The achy feeling should go away in the next 24 hours  To feel weak, sleepy or \"wiped out\". Your should start feeling better in the next 24 hours.   To experience mild discomforts such as sore lip or tongue, headache, cramps, gas pains or a bloated feeling in your abdomen.   To experience mild back pain or soreness for a day or two if you had spinal or epidural anesthesia.   If you had laparoscopic surgery, to feel shoulder pain or discomfort on the day of surgery.   For some patients to have nausea after surgery/anesthesia    If you feel nausea or experience vomiting:   Try to move around less.   Eat less than usual or drink only liquids until the next morning   Nausea should resolve in about 24 hours    If you have a problem when you are at home:    Call your surgeons office   Discharge Instructions: After Your Surgery  You’ve just had surgery. During surgery, you were given medicine called anesthesia to keep you relaxed and free of pain. After surgery, you may have some pain or nausea. This is common. Here are some tips for feeling better and getting well after surgery.   Going home  Your healthcare provider will show you how to take care of yourself when you go home. They'll also answer your questions. Have an adult family member or friend drive you home. For the first 24 hours after your surgery:   Don't drive or use heavy equipment.  Don't make important decisions or sign legal papers.  Take medicines as directed.  Don't drink alcohol.  Have someone stay with you, if needed. They can watch for problems and help keep you safe.  Be sure to go to all follow-up visits with your healthcare provider. And rest after your surgery for as long as your provider tells you to.    Coping with pain  If you have pain after surgery, pain medicine will help you feel better. Take it as directed, before pain becomes severe. Also, ask your healthcare provider or pharmacist about other ways to control pain. This might be with heat, ice, or relaxation. And follow any other instructions your surgeon or nurse gives you.      Stay on schedule with your medicine.     Tips for taking pain medicine  To get the best relief possible, remember these points:   Pain medicines can upset your stomach. Taking them with a little food may help.  Most pain relievers taken by mouth need at least 20 to 30 minutes to start to work.  Don't wait till your pain becomes severe before you take your medicine. Try to time your medicine so that you can take it before starting an activity. This might be before you get dressed, go for a walk, or sit down for dinner.  Constipation is a common side effect of some pain medicines. Call your healthcare provider before taking any medicines such as laxatives or stool softeners to help ease constipation. Also ask if you should skip any foods. Drinking lots of fluids and eating foods such as fruits and vegetables that are high in fiber can also help. Remember, don't take laxatives unless your surgeon has prescribed them.  Drinking alcohol and taking pain medicine can cause dizziness and slow your breathing. It can even be deadly. Don't drink alcohol while taking pain medicine.  Pain medicine can make you react more slowly to things. Don't drive or run machinery while taking pain medicine.  Your healthcare provider may tell you to take acetaminophen to help ease your pain. Ask them how much you're supposed to take each day. Acetaminophen or other pain relievers may interact with your prescription medicines or other over-the-counter (OTC) medicines. Some prescription medicines have acetaminophen and other ingredients in them. Using both prescription and OTC acetaminophen for pain can cause  you to accidentally overdose. Read the labels on your OTC medicines with care. This will help you to clearly know the list of ingredients, how much to take, and any warnings. It may also help you not take too much acetaminophen. If you have questions or don't understand the information, ask your pharmacist or healthcare provider to explain it to you before you take the OTC medicine.   Managing nausea  Some people have an upset stomach (nausea) after surgery. This is often because of anesthesia, pain, or pain medicine, less movement of food in the stomach, or the stress of surgery. These tips will help you handle nausea and eat healthy foods as you get better. If you were on a special food plan before surgery, ask your healthcare provider if you should follow it while you get better. Check with your provider on how your eating should progress. It may depend on the surgery you had. These general tips may help:   Don't push yourself to eat. Your body will tell you when to eat and how much.  Start off with clear liquids and soup. They're easier to digest.  Next try semi-solid foods as you feel ready. These include mashed potatoes, applesauce, and gelatin.  Slowly move to solid foods. Don’t eat fatty, rich, or spicy foods at first.  Don't force yourself to have 3 large meals a day. Instead eat smaller amounts more often.  Take pain medicines with a small amount of solid food, such as crackers or toast. This helps prevent nausea.  When to call your healthcare provider  Call your healthcare provider right away if you have any of these:   You still have too much pain, or the pain gets worse, after taking the medicine. The medicine may not be strong enough. Or there may be a complication from the surgery.  You feel too sleepy, dizzy, or groggy. The medicine may be too strong.  Side effects such as nausea or vomiting. Your healthcare provider may advise taking other medicines to .  Skin changes such as rash, itching, or  hives. This may mean you have an allergic reaction. Your provider may advise taking other medicines.  The incision looks different (for instance, part of it opens up).  Bleeding or fluid leaking from the incision site, and weren't told to expect that.  Fever of 100.4°F (38°C) or higher, or as directed by your provider.  Call 911  Call 911 right away if you have:   Trouble breathing  Facial swelling    If you have obstructive sleep apnea   You were given anesthesia medicine during surgery to keep you comfortable and free of pain. After surgery, you may have more apnea spells because of this medicine and other medicines you were given. The spells may last longer than normal.    At home:  Keep using the continuous positive airway pressure (CPAP) device when you sleep. Unless your healthcare provider tells you not to, use it when you sleep, day or night. CPAP is a common device used to treat obstructive sleep apnea.  Talk with your provider before taking any pain medicine, muscle relaxants, or sedatives. Your provider will tell you about the possible dangers of taking these medicines.  Contact your provider if your sleeping changes a lot even when taking medicines as directed.  Preet last reviewed this educational content on 10/1/2021  © 3486-2611 [employer]. All rights reserved. This information is not intended as a substitute for professional medical care. Always follow your healthcare professional's instructions.

## 2024-08-07 NOTE — TELEPHONE ENCOUNTER
Patient called, verified Name and . Relayed MARGI Bower's message below. Patient verbalized understanding. She will go back to the lab tomorrow to repeat the CBC. No further questions at this time.

## 2024-08-07 NOTE — TELEPHONE ENCOUNTER
----- Message From: Davina Angeles PA-C Sent: 8/7/2024 3:33 PM CDT---    UA is normal. Please advise the patient to go to the lab to repeat CBC.

## 2024-08-07 NOTE — H&P
Davina Angeles PA-C   Physician Assistant  Specialty: Physician Assistant     Progress Notes     Signed     Encounter Date: 7/18/2024     Signed       Expand All Collapse All    HPI:      HPI  A 43-year-old female is in the office for Pre-Op. The patient is scheduled for Arthroscopy right knee with partial medial meniscectomy on 08/09/2024.   The patient denies chest pain, SOB, N/V/C/D, fever, dizziness, syncope, and abdominal pain. There are no other concerns today.  Medications:      Current Medications          Current Outpatient Medications   Medication Sig Dispense Refill    TRAMADOL 50 MG Oral Tab Take 1 tablet (50 mg total) by mouth every 6 (six) hours as needed for Pain. 120 tablet 0    semaglutide (OZEMPIC, 0.25 OR 0.5 MG/DOSE,) 2 MG/3ML Subcutaneous Solution Pen-injector Inject 0.5 mg into the skin once a week. 9 mL 1    hydroCHLOROthiazide 25 MG Oral Tab Take 1 tablet (25 mg total) by mouth daily. 90 tablet 1    metFORMIN  MG Oral Tablet 24 Hr Take 1 tablet (500 mg total) by mouth 2 (two) times daily with meals. (Patient taking differently: Take 2 tablets (1,000 mg total) by mouth 2 (two) times daily with meals.) 180 tablet 3            Allergies:      Allergies         Allergies   Allergen Reactions    Cyclobenzaprine OTHER (SEE COMMENTS)       Pt stated it makes her \"shaky.\"    Raisins RASH            History:           Health Maintenance   Topic Date Due    Diabetes Care Dilated Eye Exam  Never done    Colorectal Cancer Screening  Never done    Mammogram  10/21/2022    COVID-19 Vaccine (4 - 2023-24 season) 09/01/2023    Annual Depression Screening  Never done    Tobacco Cessation Counseling  Never done    Influenza Vaccine (1) 10/01/2024    Diabetes Care A1C  01/08/2025    Diabetes Care Foot Exam  04/19/2025    Diabetes Care: Microalb/Creat Ratio  04/19/2025    Annual Physical  04/19/2025    Diabetes Care: GFR  07/08/2025    DTaP,Tdap,and Td Vaccines (3 - Td or Tdap) 11/03/2026    Pap  Smear  04/19/2027    Pneumococcal Vaccine: Birth to 64yrs  Completed         Patient's last menstrual period was 06/30/2024 (approximate).   Past Medical History:      Past Medical History       Past Medical History:    Diabetes (HCC)    Essential hypertension    High blood pressure    Osteoarthritis            Past Surgical History:      Past Surgical History         Past Surgical History:   Procedure Laterality Date    Arthrotomy,open repair meniscus Right       2015    Other surgical history         Abdominal Abscess surgery            Family History:      Family History         Family History   Problem Relation Age of Onset    Hypertension Father      Lipids Father      Diabetes Father      Hypertension Mother      Lipids Mother      Diabetes Mother      Ovarian Cancer Mother 49            Social History:      Social History               Socioeconomic History    Marital status: Single       Spouse name: Not on file    Number of children: Not on file    Years of education: Not on file    Highest education level: Not on file   Occupational History    Not on file   Tobacco Use    Smoking status: Some Days       Current packs/day: 0.15       Average packs/day: 0.2 packs/day for 24.0 years (3.6 ttl pk-yrs)       Types: Cigarettes       Passive exposure: Never    Smokeless tobacco: Never   Vaping Use    Vaping status: Never Used   Substance and Sexual Activity    Alcohol use: Not Currently       Comment: occassion    Drug use: Never    Sexual activity: Not on file   Other Topics Concern    Not on file   Social History Narrative    Not on file      Social Determinants of Health           Financial Resource Strain: Not on file   Food Insecurity: No Food Insecurity (2/24/2024)     [facility]     Hunger Vital Sign      Worried About Running Out of Food in the Last Year: Never true      Ran Out of Food in the Last Year: Never true   Transportation Needs: No  Transportation Needs (2/24/2024)     [facility], [facility]     PRAPARE - Transportation      Lack of Transportation (Medical): No      Lack of Transportation (Non-Medical): No   Physical Activity: Not on file   Stress: Not on file   Social Connections: Not on file   Housing Stability: Unknown (2/24/2024)     [facility], [facility]     Housing Stability Vital Sign      Unable to Pay for Housing in the Last Year: No      Number of Places Lived in the Last Year: Not on file      Unstable Housing in the Last Year: No            Review of Systems:   Review of Systems   Constitutional: Negative.  Negative for activity change, chills, fatigue and fever.   HENT: Negative.  Negative for congestion, ear discharge, ear pain, postnasal drip, rhinorrhea, sinus pressure, sinus pain and sore throat.    Eyes: Negative.    Respiratory: Negative.  Negative for cough, chest tightness, shortness of breath and wheezing.    Cardiovascular: Negative.  Negative for chest pain and palpitations.   Gastrointestinal: Negative.  Negative for abdominal distention, abdominal pain, blood in stool, constipation, diarrhea, nausea and vomiting.   Genitourinary: Negative.    Musculoskeletal: Negative.    Skin: Negative.  Negative for rash.   Neurological: Negative.    Psychiatric/Behavioral: Negative.               Vitals:     07/18/24 1558   BP: 135/84   Pulse: 86   Weight: 228 lb (103.4 kg)   Height: 5' 6\" (1.676 m)      Body mass index is 36.8 kg/m².     Physical Exam:   Physical Exam  Vitals reviewed.   Constitutional:       General: She is not in acute distress.     Appearance: She is well-developed.   HENT:      Head: Normocephalic and atraumatic.      Right Ear: Tympanic membrane, ear canal and external ear normal. There is no impacted cerumen.      Left Ear: Tympanic membrane, ear canal and external ear normal. There is no impacted cerumen.       Nose: Nose normal.      Mouth/Throat:      Mouth: Mucous membranes are moist.      Pharynx: Oropharynx is clear. No oropharyngeal exudate or posterior oropharyngeal erythema.   Eyes:      General:         Right eye: No discharge.         Left eye: No discharge.      Conjunctiva/sclera: Conjunctivae normal.   Cardiovascular:      Rate and Rhythm: Normal rate and regular rhythm.      Heart sounds: S1 normal and S2 normal. Murmur heard.   Pulmonary:      Effort: Pulmonary effort is normal.      Breath sounds: Normal breath sounds. No wheezing or rales.   Chest:      Chest wall: No tenderness.   Abdominal:      General: Abdomen is flat. Bowel sounds are normal. There is no distension.      Tenderness: There is no abdominal tenderness. There is no right CVA tenderness or left CVA tenderness.   Lymphadenopathy:      Cervical: No cervical adenopathy.   Skin:     Findings: No rash.   Neurological:      Mental Status: She is alert and oriented to person, place, and time.   Psychiatric:         Behavior: Behavior is cooperative.            Assessment and Plan::      Problem List Items Addressed This Visit                  HCC Problems     Type 2 diabetes mellitus without complication, without long-term current use of insulin (HCC)     Relevant Orders     Hemoglobin A1C          Musculoskeletal and Injuries     Chronic pain of right knee      Other Visit Diagnoses         Pre-operative examination    -  Primary     Relevant Orders     CBC With Differential With Platelet     Comp Metabolic Panel (14)     Prothrombin Time (PT)     PTT, Activated [E]     Urinalysis with Culture Reflex             The patient is scheduled for Arthroscopy right knee with partial medial meniscectomy on 08/09/2024.      The patient is cleared for surgery.               Electronically signed by Davina Angeles PA-C at 7/18/2024  5:33 PM

## 2024-08-08 ENCOUNTER — LAB ENCOUNTER (OUTPATIENT)
Dept: LAB | Age: 44
End: 2024-08-08
Attending: PHYSICIAN ASSISTANT
Payer: COMMERCIAL

## 2024-08-08 DIAGNOSIS — R30.0 DYSURIA: ICD-10-CM

## 2024-08-08 DIAGNOSIS — Z01.818 PRE-OPERATIVE EXAMINATION: ICD-10-CM

## 2024-08-08 LAB
BASOPHILS # BLD AUTO: 0.03 X10(3) UL (ref 0–0.2)
BASOPHILS NFR BLD AUTO: 0.3 %
DEPRECATED RDW RBC AUTO: 44.4 FL (ref 35.1–46.3)
EOSINOPHIL # BLD AUTO: 0.31 X10(3) UL (ref 0–0.7)
EOSINOPHIL NFR BLD AUTO: 3.1 %
ERYTHROCYTE [DISTWIDTH] IN BLOOD BY AUTOMATED COUNT: 13.2 % (ref 11–15)
HCT VFR BLD AUTO: 36.3 %
HGB BLD-MCNC: 12.6 G/DL
IMM GRANULOCYTES # BLD AUTO: 0.03 X10(3) UL (ref 0–1)
IMM GRANULOCYTES NFR BLD: 0.3 %
LYMPHOCYTES # BLD AUTO: 3.2 X10(3) UL (ref 1–4)
LYMPHOCYTES NFR BLD AUTO: 31.8 %
MCH RBC QN AUTO: 32.2 PG (ref 26–34)
MCHC RBC AUTO-ENTMCNC: 34.7 G/DL (ref 31–37)
MCV RBC AUTO: 92.8 FL
MONOCYTES # BLD AUTO: 0.67 X10(3) UL (ref 0.1–1)
MONOCYTES NFR BLD AUTO: 6.7 %
NEUTROPHILS # BLD AUTO: 5.81 X10 (3) UL (ref 1.5–7.7)
NEUTROPHILS # BLD AUTO: 5.81 X10(3) UL (ref 1.5–7.7)
NEUTROPHILS NFR BLD AUTO: 57.8 %
PLATELET # BLD AUTO: 317 10(3)UL (ref 150–450)
RBC # BLD AUTO: 3.91 X10(6)UL
WBC # BLD AUTO: 10.1 X10(3) UL (ref 4–11)

## 2024-08-08 PROCEDURE — 36415 COLL VENOUS BLD VENIPUNCTURE: CPT

## 2024-08-08 PROCEDURE — 85025 COMPLETE CBC W/AUTO DIFF WBC: CPT

## 2024-08-09 ENCOUNTER — HOSPITAL ENCOUNTER (OUTPATIENT)
Facility: HOSPITAL | Age: 44
Setting detail: HOSPITAL OUTPATIENT SURGERY
Discharge: HOME | End: 2024-08-09
Attending: ORTHOPAEDIC SURGERY | Admitting: ORTHOPAEDIC SURGERY
Payer: COMMERCIAL

## 2024-08-09 ENCOUNTER — ANESTHESIA EVENT (OUTPATIENT)
Dept: SURGERY | Facility: HOSPITAL | Age: 44
End: 2024-08-09
Payer: COMMERCIAL

## 2024-08-09 ENCOUNTER — ANESTHESIA (OUTPATIENT)
Dept: SURGERY | Facility: HOSPITAL | Age: 44
End: 2024-08-09
Payer: COMMERCIAL

## 2024-08-09 VITALS
OXYGEN SATURATION: 95 % | BODY MASS INDEX: 36.16 KG/M2 | TEMPERATURE: 98 F | DIASTOLIC BLOOD PRESSURE: 74 MMHG | RESPIRATION RATE: 18 BRPM | WEIGHT: 225 LBS | HEIGHT: 66 IN | HEART RATE: 75 BPM | SYSTOLIC BLOOD PRESSURE: 139 MMHG

## 2024-08-09 DIAGNOSIS — S83.231D COMPLEX TEAR OF MEDIAL MENISCUS, CURRENT INJURY, RIGHT KNEE, SUBSEQUENT ENCOUNTER: Primary | ICD-10-CM

## 2024-08-09 LAB
B-HCG UR QL: NEGATIVE
GLUCOSE BLDC GLUCOMTR-MCNC: 156 MG/DL (ref 70–99)
GLUCOSE BLDC GLUCOMTR-MCNC: 163 MG/DL (ref 70–99)

## 2024-08-09 PROCEDURE — 82962 GLUCOSE BLOOD TEST: CPT

## 2024-08-09 PROCEDURE — 81025 URINE PREGNANCY TEST: CPT

## 2024-08-09 PROCEDURE — 0SBC4ZZ EXCISION OF RIGHT KNEE JOINT, PERCUTANEOUS ENDOSCOPIC APPROACH: ICD-10-PCS | Performed by: ORTHOPAEDIC SURGERY

## 2024-08-09 RX ORDER — HYDROMORPHONE HYDROCHLORIDE 1 MG/ML
0.6 INJECTION, SOLUTION INTRAMUSCULAR; INTRAVENOUS; SUBCUTANEOUS EVERY 5 MIN PRN
Status: DISCONTINUED | OUTPATIENT
Start: 2024-08-09 | End: 2024-08-09

## 2024-08-09 RX ORDER — NICOTINE POLACRILEX 4 MG
15 LOZENGE BUCCAL
Status: DISCONTINUED | OUTPATIENT
Start: 2024-08-09 | End: 2024-08-09

## 2024-08-09 RX ORDER — HYDROMORPHONE HYDROCHLORIDE 1 MG/ML
0.4 INJECTION, SOLUTION INTRAMUSCULAR; INTRAVENOUS; SUBCUTANEOUS EVERY 5 MIN PRN
Status: DISCONTINUED | OUTPATIENT
Start: 2024-08-09 | End: 2024-08-09

## 2024-08-09 RX ORDER — ACETAMINOPHEN 500 MG
1000 TABLET ORAL ONCE
Status: COMPLETED | OUTPATIENT
Start: 2024-08-09 | End: 2024-08-09

## 2024-08-09 RX ORDER — METOCLOPRAMIDE 10 MG/1
10 TABLET ORAL ONCE
Status: COMPLETED | OUTPATIENT
Start: 2024-08-09 | End: 2024-08-09

## 2024-08-09 RX ORDER — SODIUM CHLORIDE, SODIUM LACTATE, POTASSIUM CHLORIDE, CALCIUM CHLORIDE 600; 310; 30; 20 MG/100ML; MG/100ML; MG/100ML; MG/100ML
INJECTION, SOLUTION INTRAVENOUS CONTINUOUS
Status: DISCONTINUED | OUTPATIENT
Start: 2024-08-09 | End: 2024-08-09

## 2024-08-09 RX ORDER — ONDANSETRON 2 MG/ML
INJECTION INTRAMUSCULAR; INTRAVENOUS AS NEEDED
Status: DISCONTINUED | OUTPATIENT
Start: 2024-08-09 | End: 2024-08-09 | Stop reason: SURG

## 2024-08-09 RX ORDER — TRAMADOL HYDROCHLORIDE 50 MG/1
50 TABLET ORAL EVERY 8 HOURS PRN
Qty: 15 TABLET | Refills: 0 | Status: SHIPPED | OUTPATIENT
Start: 2024-08-09 | End: 2024-08-19

## 2024-08-09 RX ORDER — HYDROMORPHONE HYDROCHLORIDE 1 MG/ML
0.2 INJECTION, SOLUTION INTRAMUSCULAR; INTRAVENOUS; SUBCUTANEOUS EVERY 5 MIN PRN
Status: DISCONTINUED | OUTPATIENT
Start: 2024-08-09 | End: 2024-08-09

## 2024-08-09 RX ORDER — GLYCOPYRROLATE 0.2 MG/ML
INJECTION, SOLUTION INTRAMUSCULAR; INTRAVENOUS AS NEEDED
Status: DISCONTINUED | OUTPATIENT
Start: 2024-08-09 | End: 2024-08-09 | Stop reason: SURG

## 2024-08-09 RX ORDER — LIDOCAINE HYDROCHLORIDE 10 MG/ML
INJECTION, SOLUTION EPIDURAL; INFILTRATION; INTRACAUDAL; PERINEURAL AS NEEDED
Status: DISCONTINUED | OUTPATIENT
Start: 2024-08-09 | End: 2024-08-09 | Stop reason: SURG

## 2024-08-09 RX ORDER — ONDANSETRON 2 MG/ML
4 INJECTION INTRAMUSCULAR; INTRAVENOUS EVERY 6 HOURS PRN
Status: DISCONTINUED | OUTPATIENT
Start: 2024-08-09 | End: 2024-08-09

## 2024-08-09 RX ORDER — FAMOTIDINE 10 MG/ML
20 INJECTION, SOLUTION INTRAVENOUS ONCE
Status: COMPLETED | OUTPATIENT
Start: 2024-08-09 | End: 2024-08-09

## 2024-08-09 RX ORDER — DEXTROSE MONOHYDRATE 25 G/50ML
50 INJECTION, SOLUTION INTRAVENOUS
Status: DISCONTINUED | OUTPATIENT
Start: 2024-08-09 | End: 2024-08-09

## 2024-08-09 RX ORDER — IBUPROFEN 200 MG
600 TABLET ORAL EVERY 8 HOURS PRN
Qty: 60 TABLET | Refills: 0 | Status: SHIPPED | OUTPATIENT
Start: 2024-08-09

## 2024-08-09 RX ORDER — DEXAMETHASONE SODIUM PHOSPHATE 4 MG/ML
VIAL (ML) INJECTION AS NEEDED
Status: DISCONTINUED | OUTPATIENT
Start: 2024-08-09 | End: 2024-08-09 | Stop reason: SURG

## 2024-08-09 RX ORDER — MORPHINE SULFATE 4 MG/ML
2 INJECTION, SOLUTION INTRAMUSCULAR; INTRAVENOUS EVERY 10 MIN PRN
Status: DISCONTINUED | OUTPATIENT
Start: 2024-08-09 | End: 2024-08-09

## 2024-08-09 RX ORDER — METOCLOPRAMIDE HYDROCHLORIDE 5 MG/ML
10 INJECTION INTRAMUSCULAR; INTRAVENOUS ONCE
Status: COMPLETED | OUTPATIENT
Start: 2024-08-09 | End: 2024-08-09

## 2024-08-09 RX ORDER — ACETAMINOPHEN 500 MG
1000 TABLET ORAL EVERY 8 HOURS PRN
Qty: 60 TABLET | Refills: 0 | Status: SHIPPED | OUTPATIENT
Start: 2024-08-09

## 2024-08-09 RX ORDER — ROPIVACAINE HYDROCHLORIDE 5 MG/ML
INJECTION, SOLUTION EPIDURAL; INFILTRATION; PERINEURAL AS NEEDED
Status: DISCONTINUED | OUTPATIENT
Start: 2024-08-09 | End: 2024-08-09 | Stop reason: HOSPADM

## 2024-08-09 RX ORDER — NICOTINE POLACRILEX 4 MG
30 LOZENGE BUCCAL
Status: DISCONTINUED | OUTPATIENT
Start: 2024-08-09 | End: 2024-08-09

## 2024-08-09 RX ORDER — MORPHINE SULFATE 10 MG/ML
6 INJECTION, SOLUTION INTRAMUSCULAR; INTRAVENOUS EVERY 10 MIN PRN
Status: DISCONTINUED | OUTPATIENT
Start: 2024-08-09 | End: 2024-08-09

## 2024-08-09 RX ORDER — NALOXONE HYDROCHLORIDE 0.4 MG/ML
0.08 INJECTION, SOLUTION INTRAMUSCULAR; INTRAVENOUS; SUBCUTANEOUS AS NEEDED
Status: DISCONTINUED | OUTPATIENT
Start: 2024-08-09 | End: 2024-08-09

## 2024-08-09 RX ORDER — MORPHINE SULFATE 4 MG/ML
4 INJECTION, SOLUTION INTRAMUSCULAR; INTRAVENOUS EVERY 10 MIN PRN
Status: DISCONTINUED | OUTPATIENT
Start: 2024-08-09 | End: 2024-08-09

## 2024-08-09 RX ORDER — FAMOTIDINE 20 MG/1
20 TABLET, FILM COATED ORAL ONCE
Status: COMPLETED | OUTPATIENT
Start: 2024-08-09 | End: 2024-08-09

## 2024-08-09 RX ORDER — ASPIRIN 81 MG/1
81 TABLET ORAL 2 TIMES DAILY WITH MEALS
Qty: 24 TABLET | Refills: 0 | Status: SHIPPED | OUTPATIENT
Start: 2024-08-09

## 2024-08-09 RX ADMIN — LIDOCAINE HYDROCHLORIDE 50 MG: 10 INJECTION, SOLUTION EPIDURAL; INFILTRATION; INTRACAUDAL; PERINEURAL at 12:20:00

## 2024-08-09 RX ADMIN — ONDANSETRON 4 MG: 2 INJECTION INTRAMUSCULAR; INTRAVENOUS at 12:17:00

## 2024-08-09 RX ADMIN — SODIUM CHLORIDE, SODIUM LACTATE, POTASSIUM CHLORIDE, CALCIUM CHLORIDE: 600; 310; 30; 20 INJECTION, SOLUTION INTRAVENOUS at 13:21:00

## 2024-08-09 RX ADMIN — DEXAMETHASONE SODIUM PHOSPHATE 4 MG: 4 MG/ML VIAL (ML) INJECTION at 12:17:00

## 2024-08-09 RX ADMIN — GLYCOPYRROLATE 0.2 MG: 0.2 INJECTION, SOLUTION INTRAMUSCULAR; INTRAVENOUS at 12:17:00
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
## 2024-08-09 NOTE — ANESTHESIA POSTPROCEDURE EVALUATION
Patient: Tasia Dawson    Procedure Summary       Date: 08/09/24 Room / Location: [facility] MAIN OR  / [facility] MAIN OR    Anesthesia Start: 1216 Anesthesia Stop:     Procedure: Right knee arthroscopy with partial medial meniscectomy, medial synovectomy (Right: Knee) Diagnosis:       Degenerative tear of medial meniscus of right knee      (Degenerative tear of medial meniscus of right knee [M23.203])    Surgeons: Bran Duffy MD Anesthesiologist: Arnulfo Frost MD    Anesthesia Type: general ASA Status: 2            Anesthesia Type: general    Vitals Value Taken Time   /78 08/09/24 1321   Temp 97 08/09/24 1321   Pulse 77 08/09/24 1320   Resp 15 08/09/24 1321   SpO2 98 % 08/09/24 1320   Vitals shown include unfiled device data.    [facility] AN Post Evaluation:   Patient Evaluated in PACU  Patient Participation: waiting for patient participation  Level of Consciousness: sleepy but conscious  Pain Management: adequate  Airway Patency:patent  Dental exam unchanged from preop  Yes    Nausea/Vomiting: none  Cardiovascular Status: acceptable and hemodynamically stable  Respiratory Status: acceptable and nasal cannula  Postoperative Hydration acceptable      Sheila Moreau CRNA  8/9/2024 1:21 PM

## 2024-08-09 NOTE — ANESTHESIA PROCEDURE NOTES
Airway  Date/Time: 8/9/2024 12:23 PM  Urgency: Elective    Airway not difficult    General Information and Staff    Patient location during procedure: OR  Resident/CRNA: Sheila Moreau CRNA  Performed: CRNA   Performed by: Sheila Moreau CRNA  Authorized by: Arnulfo Frost MD      Indications and Patient Condition  Indications for airway management: anesthesia  Spontaneous Ventilation: absent  Sedation level: deep  Preoxygenated: yes  Patient position: ramp  MILS maintained throughout  Mask difficulty assessment: 2 - vent by mask + OA or adjuvant +/- NMBA  No planned trial extubation    Final Airway Details  Final airway type: supraglottic airway      Successful airway: classic  Size 4       Number of attempts at approach: 2  Number of other approaches attempted: 0    Additional Comments  Atraumatic LMA placement. Soft bite block placed between left molars

## 2024-08-09 NOTE — BRIEF OP NOTE
Pre-Operative Diagnosis: Degenerative tear of medial meniscus of right knee [M23.203]     Post-Operative Diagnosis: Right knee 1) degenerative tear of medial meniscus of right knee [M23.203], 2) medial synovitis    Procedure Performed:   Arthroscopy right knee with 1) partial medial meniscectomy, 2) medial synovectomy    Surgeons and Role:     * Bran Duffy MD - Primary    Assistant(s):  PA: Lakeisha Burns PA    Anesthesia: Dr. Frost with general laryngeal mask anesthesia     Surgical Findings: ACL intact.  Patellofemoral and lateral compartments essentially normal.  Medial compartment with area of grade II chondromalacia.  Pictures taken.  Posterior medial meniscus tear is on MRI.  Debrided with shaver and ArthroWand.  Majority of meniscus that would be preserved.  Synovitis in the medial compartment photographed and debrided with combination of shaver and ArthroWand.  No loose bodies.  Tourniquet: 20 minutes at 300 mmHg.  Drain: None  Specimen: None  Complications: None  Estimated Blood Loss: 5 cc  Stable to PACU.    Bran Duffy MD  8/9/2024  12:26 PM

## 2024-08-10 NOTE — OPERATIVE REPORT
[facility]    PATIENT'S NAME: FOZIA RODRIGEZ   ATTENDING PHYSICIAN: Bran Duffy MD   OPERATING PHYSICIAN: Bran Duffy MD   PATIENT ACCOUNT#:   [id number]    LOCATION:  [facility] 10  MEDICAL RECORD #:   [id number]       YOB: 1980  ADMISSION DATE:       08/09/2024      OPERATION DATE:  08/09/2024    OPERATIVE REPORT      PREOPERATIVE DIAGNOSIS:  Right knee degenerative tear, medial meniscus.  POSTOPERATIVE DIAGNOSIS:  Right knee:  1.   Degenerative tear, medial meniscus.  2.   Medial synovitis.  PROCEDURE:  Arthroscopy of right knee with:  1.   Partial medial meniscectomy.  2.   Medial synovectomy.    ASSISTANT:  Lakeisha Burns PA-C.    ANESTHESIA:  Dr. Frost, with general laryngeal mask anesthesia.    INDICATIONS:  The patient is a 44-year-old woman with medial joint line pain of the right knee.  She has failed nonoperative care.  The diagnoses have been made with physical exam, x-ray, and MRI.  The risks and complications of surgery were discussed and no guarantees were given.  The consent was signed.    OPERATIVE TECHNIQUE:  Patient was brought to the operating room, placed in supine position.  Appropriate IV access and monitors were placed.  Ancef 2 g IV was given as a prophylaxis.  General laryngeal mask anesthesia was performed by Dr. Frost.  SCD was placed on the left leg.  A tourniquet was placed on the upper right thigh, followed by a Ron leg moeller.  The end of the table was let down, and the left leg was placed on a padded bump.  The right lower extremity was then prepped and draped in sterile fashion with ChloraPrep.  The leg was exsanguinated, and the tourniquet inflated to 300 mmHg.  Tourniquet time for the case was 20 minutes.    The portals were created, and the suprapatellar pouch was unremarkable.  The patella and trochlea had normal cartilage.  No loose bodies were seen.  The medial gutter was unremarkable.  The ACL was intact.  The lateral  compartment was unremarkable.    The medial compartment had synovitis which was photographed and debrided with a combination of shaver and ArthroWand.  The medial cartilage on the femoral and tibial sides was nearly normal except for a fairly large defect area on the medial femoral condyle, grade 2.  It did not go all the way down to the subchondral bone.  Photographs of this were taken as well.     Posterior medial meniscus tear was seen both with probe and visualization consistent with the MRI findings.  This was debrided with a combination of shaver and ArthroWand.  The remainder of the meniscus was able to be preserved and was stable.    A second look through the knee showed no loose fragments of cartilage.  The equipment was removed.  The knee was injected with 30 mL of Naropin 0.5%.  Wounds were closed with nylon suture.  Sterile dressings were applied.  She was awakened, extubated, brought to the recovery room in stable condition.  Postoperative instructions were given in both written and oral form.  She will be placed on aspirin 81 mg for DVT prophylaxis.  She will follow up with Dr. Duffy in 10 to 12 days time in the office.    Blood loss was 5 mL.  No specimens were sent.  No drains used.  No complications were noted.  Patient tolerated the procedure well.    Dictated By Bran Duffy MD  d: 08/09/2024 13:12:22  t: 08/09/2024 23:42:31  Job [id number]  [facility]/    cc: Haroldo Escalante DO

## 2024-08-12 ENCOUNTER — TELEPHONE (OUTPATIENT)
Dept: ORTHOPEDICS CLINIC | Facility: CLINIC | Age: 44
End: 2024-08-12

## 2024-08-12 NOTE — TELEPHONE ENCOUNTER
Patient called regarding faxing forms, informed patient authorization was invalid.  Must state [employer] as authorized to receive with fax number.  Patient acknowledged.

## 2024-08-16 DIAGNOSIS — S83.231D COMPLEX TEAR OF MEDIAL MENISCUS, CURRENT INJURY, RIGHT KNEE, SUBSEQUENT ENCOUNTER: ICD-10-CM

## 2024-08-17 DIAGNOSIS — E11.9 TYPE 2 DIABETES MELLITUS WITHOUT COMPLICATION, WITHOUT LONG-TERM CURRENT USE OF INSULIN (HCC): ICD-10-CM

## 2024-08-19 RX ORDER — TRAMADOL HYDROCHLORIDE 50 MG/1
50 TABLET ORAL EVERY 8 HOURS PRN
Qty: 90 TABLET | Refills: 0 | Status: SHIPPED | OUTPATIENT
Start: 2024-08-19

## 2024-08-19 RX ORDER — TRAMADOL HYDROCHLORIDE 50 MG/1
50 TABLET ORAL EVERY 6 HOURS PRN
Qty: 120 TABLET | Refills: 0 | OUTPATIENT
Start: 2024-08-19

## 2024-08-19 NOTE — TELEPHONE ENCOUNTER
Prescription pended, if appropriate - coverage for Bill RUSS    Spoke to patient, full name and date of birth verified.  Patient asking status of Tramadol refill request.   Patient had knee surgery (8/9/24) and is completely out of the medication.   Confirmed pharmacy is [id number].    Last office visit: 7/18/24    Last refills on Tramadol:   8/13/24, quantity 15, 5 day supply  7/17/24, quantity 120, 30 day supply  6/18/24, quantity 120, 30 day supply

## 2024-08-20 ENCOUNTER — OFFICE VISIT (OUTPATIENT)
Dept: ORTHOPEDICS CLINIC | Facility: CLINIC | Age: 44
End: 2024-08-20
Payer: COMMERCIAL

## 2024-08-20 DIAGNOSIS — M79.661 PAIN AND SWELLING OF RIGHT LOWER LEG: ICD-10-CM

## 2024-08-20 DIAGNOSIS — M25.561 CHRONIC PAIN OF RIGHT KNEE: ICD-10-CM

## 2024-08-20 DIAGNOSIS — Z98.890 S/P RIGHT KNEE ARTHROSCOPY: ICD-10-CM

## 2024-08-20 DIAGNOSIS — M23.203 DEGENERATIVE TEAR OF MEDIAL MENISCUS OF RIGHT KNEE: Primary | ICD-10-CM

## 2024-08-20 DIAGNOSIS — G89.29 CHRONIC PAIN OF RIGHT KNEE: ICD-10-CM

## 2024-08-20 DIAGNOSIS — S83.231D COMPLEX TEAR OF MEDIAL MENISCUS, CURRENT INJURY, RIGHT KNEE, SUBSEQUENT ENCOUNTER: ICD-10-CM

## 2024-08-20 DIAGNOSIS — M79.89 PAIN AND SWELLING OF RIGHT LOWER LEG: ICD-10-CM

## 2024-08-20 PROCEDURE — 99024 POSTOP FOLLOW-UP VISIT: CPT

## 2024-08-20 RX ORDER — TRAMADOL HYDROCHLORIDE 50 MG/1
50 TABLET ORAL EVERY 8 HOURS PRN
Qty: 20 TABLET | Refills: 0 | Status: SHIPPED | OUTPATIENT
Start: 2024-08-20

## 2024-08-20 RX ORDER — SEMAGLUTIDE 0.68 MG/ML
0.5 INJECTION, SOLUTION SUBCUTANEOUS WEEKLY
Qty: 9 ML | Refills: 1 | OUTPATIENT
Start: 2024-08-20 | End: 2024-11-18

## 2024-08-20 NOTE — PROGRESS NOTES
NURSING INTAKE COMMENTS:   Chief Complaint   Patient presents with    Post-Op     Right knee arthroscopy, not in PT, 8/10 pain scale       HPI: This 44 year old female presents today approximately 2 weeks status post right knee arthroscopy with partial medial meniscectomy and medial synovectomy.  Patient's daughter is with her today.  Patient is continue to have pain in her right knee.  For pain she is taking Tylenol as needed.  She reports that the Tylenol which we prescribed after surgery made her stomach upset.  Interestingly, the patient did not have any nausea or stomach upset with over-the-counter Tylenol.  She denies any fever, chills, night sweats.  Denies any numbness or tingling.  She does report that she has some right calf pain.    Past Medical History:    Diabetes (HCC)    Essential hypertension    High blood pressure    High cholesterol    Osteoarthritis     Past Surgical History:   Procedure Laterality Date    Arthroscopy of joint unlisted      Arthrotomy,open repair meniscus Right     2015    Hc epidural anesthesia      labor    Other surgical history      Abdominal Abscess surgery     Current Outpatient Medications   Medication Sig Dispense Refill    traMADol 50 MG Oral Tab Take 1 tablet (50 mg total) by mouth every 8 (eight) hours as needed for Pain. No alcohol or driving on this med. Stop if lethargic or hallucinating. 20 tablet 0    acetaminophen 500 MG Oral Tab Take 2 tablets (1,000 mg total) by mouth every 8 (eight) hours as needed for Pain. Maximum 4000 mg Tylenol/acetaminophen daily from all sources. 60 tablet 0    ibuprofen 200 MG Oral Tab Take 3 tablets (600 mg total) by mouth every 8 (eight) hours as needed. Take with food. Stop if stomach upset. 60 tablet 0    aspirin 81 MG Oral Tab EC Take 1 tablet (81 mg total) by mouth 2 (two) times daily with meals. Start dinner tonight then breakfast/dinner for 12 days. 24 tablet 0    semaglutide (OZEMPIC, 0.25 OR 0.5 MG/DOSE,) 2 MG/3ML Subcutaneous  Solution Pen-injector Inject 0.5 mg into the skin once a week. 9 mL 1     Allergies   Allergen Reactions    Cyclobenzaprine OTHER (SEE COMMENTS)     Pt stated it makes her \"shaky.\"    Raisins RASH     Family History   Problem Relation Age of Onset    Hypertension Father     Lipids Father     Diabetes Father     Hypertension Mother     Lipids Mother     Diabetes Mother     Ovarian Cancer Mother 49     No family Hx of DVT/PE    Social History     Occupational History    Not on file   Tobacco Use    Smoking status: Some Days     Current packs/day: 0.15     Average packs/day: 0.2 packs/day for 24.0 years (3.6 ttl pk-yrs)     Types: Cigarettes     Passive exposure: Never    Smokeless tobacco: Never   Vaping Use    Vaping status: Never Used   Substance and Sexual Activity    Alcohol use: Not Currently     Comment: occassion    Drug use: Never    Sexual activity: Not on file        Review of Systems:  GENERAL: feels generally well, no significant weight loss or weight gain  SKIN: no ulcerated or worrisome skin lesions  EYES:denies blurred vision or double vision  HEENT: denies new nasal congestion, sinus pain or ST  LUNGS: denies shortness of breath  CARDIOVASCULAR: denies chest pain  GI: no hematemesis, no worsening heartburn, no diarrhea  : no dysuria, no blood in urine, no difficulty urinating, no incontinence  MUSCULOSKELETAL: no other musculoskeletal complaints other than in HPI  NEURO: no numbness or tingling, no weakness or balance disorder  PSYCHE: no depression or anxiety  HEMATOLOGIC: no hx of blood dyscrasia, no Hx DVT/PE  ENDOCRINE: no thyroid or diabetes issues  ALL/ASTHMA: no new hx of severe allergy or asthma    Physical Examination:    LMP 07/29/2024 (Approximate)   Constitutional: appears well hydrated, alert and responsive, no acute distress noted  Extremities: Lower extremity skin healthy, no pitting edema.  Right calf tender to palpation.  Incision healing well, sutures removed  today.  Musculoskeletal: Right knee range of motion 3 to 100 degrees, no instability.  Tenderness to medial joint line.  Able to straight leg raise against resistance.  Able to dorsiflex and plantarflex against resistance.  Neurological: Motor and sensory function intact.    Imaging: None taken today.      No results found.     Lab Results   Component Value Date    WBC 10.1 08/08/2024    HGB 12.6 08/08/2024    .0 08/08/2024      Lab Results   Component Value Date     (H) 08/01/2024    BUN 9 08/01/2024    CREATSERUM 0.64 08/01/2024    GFRNAA 108 03/05/2021    GFRAA 124 03/05/2021        Assessment and Plan:  Diagnoses and all orders for this visit:    Degenerative tear of medial meniscus of right knee  -     Physical Therapy Referral - [facility]  -     US VENOUS DOPPLER LEG RIGHT - DIAG IMG (CPT=93971); Future    Chronic pain of right knee  -     Physical Therapy Referral - [facility]  -     US VENOUS DOPPLER LEG RIGHT - DIAG IMG (CPT=93971); Future    S/P right knee arthroscopy  -     Physical Therapy Referral - [facility]  -     US VENOUS DOPPLER LEG RIGHT - DIAG IMG (CPT=93971); Future    Pain and swelling of right lower leg  -     US VENOUS DOPPLER LEG RIGHT - DIAG IMG (CPT=93971); Future    Complex tear of medial meniscus, current injury, right knee, subsequent encounter  -     traMADol 50 MG Oral Tab; Take 1 tablet (50 mg total) by mouth every 8 (eight) hours as needed for Pain. No alcohol or driving on this med. Stop if lethargic or hallucinating.        Assessment: As above    Plan: The patient's pain I sent in a new prescription for tramadol.  Discussed with the patient she could try over-the-counter Tylenol instead of prescription strength, as she has not had any stomach issues with that previously.  Discussed with her if she begins to have any stomach issues to stop the medication.     I gave the patient a prescription for outpatient physical therapy.  She will do the  home exercises and self rehab daily.    For the patient's calf pain, we will order an ultrasound to rule out DVT.    We will see the patient in 4 weeks for reevaluation.    Follow Up: No follow-ups on file.    PETRONA Kramer

## 2024-09-06 ENCOUNTER — HOSPITAL ENCOUNTER (OUTPATIENT)
Dept: ULTRASOUND IMAGING | Facility: HOSPITAL | Age: 44
Discharge: HOME | End: 2024-09-06
Payer: COMMERCIAL

## 2024-09-06 DIAGNOSIS — M23.203 DEGENERATIVE TEAR OF MEDIAL MENISCUS OF RIGHT KNEE: ICD-10-CM

## 2024-09-06 DIAGNOSIS — M25.561 CHRONIC PAIN OF RIGHT KNEE: ICD-10-CM

## 2024-09-06 DIAGNOSIS — Z98.890 S/P RIGHT KNEE ARTHROSCOPY: ICD-10-CM

## 2024-09-06 DIAGNOSIS — G89.29 CHRONIC PAIN OF RIGHT KNEE: ICD-10-CM

## 2024-09-06 DIAGNOSIS — M79.661 PAIN AND SWELLING OF RIGHT LOWER LEG: ICD-10-CM

## 2024-09-06 DIAGNOSIS — M79.89 PAIN AND SWELLING OF RIGHT LOWER LEG: ICD-10-CM

## 2024-09-06 PROCEDURE — 93971 EXTREMITY STUDY: CPT

## 2024-09-07 ENCOUNTER — TELEPHONE (OUTPATIENT)
Dept: ORTHOPEDICS CLINIC | Facility: CLINIC | Age: 44
End: 2024-09-07

## 2024-09-17 ENCOUNTER — OFFICE VISIT (OUTPATIENT)
Dept: ORTHOPEDICS CLINIC | Facility: CLINIC | Age: 44
End: 2024-09-17
Payer: COMMERCIAL

## 2024-09-17 DIAGNOSIS — Z98.890 S/P RIGHT KNEE ARTHROSCOPY: Primary | ICD-10-CM

## 2024-09-17 PROCEDURE — 99024 POSTOP FOLLOW-UP VISIT: CPT

## 2024-09-17 NOTE — PROGRESS NOTES
NURSING INTAKE COMMENTS:   Chief Complaint   Patient presents with    Post-Op     S/p R knee arthroscopy f/u - had sx on 8/9/24 - states she had some swelling - she is walking and has some pain rated as 5-8/10 on and off - states she has a lot of cracking in her knee when she stands up        HPI: This 44 year old female presents today approximately 6 weeks status post right knee arthroscopy  With partial medial meniscectomy and medial synovectomy.  Patient reports overall she feels like she is getting better.  She continues to endorse pain.  She also feels like she sometimes has cracking in her knee.  She is currently in physical therapy, and again feels like she is slowly getting stronger and having less pain.  For pain she is taking tramadol as needed.  She denies any numbness or tingling.  Denies any fever, chills, night sweats.  Patient does endorse she sometimes has lower extremity swelling of her right leg throughout the day.  She is currently not wearing any compression stockings during the day.      Past Medical History:    Diabetes (HCC)    Essential hypertension    High blood pressure    High cholesterol    Osteoarthritis     Past Surgical History:   Procedure Laterality Date    Arthroscopy of joint unlisted      Arthrotomy,open repair meniscus Right     2015    Hc epidural anesthesia      labor    Other surgical history      Abdominal Abscess surgery     Current Outpatient Medications   Medication Sig Dispense Refill    traMADol 50 MG Oral Tab Take 1 tablet (50 mg total) by mouth every 8 (eight) hours as needed for Pain. No alcohol or driving on this med. Stop if lethargic or hallucinating. 20 tablet 0    acetaminophen 500 MG Oral Tab Take 2 tablets (1,000 mg total) by mouth every 8 (eight) hours as needed for Pain. Maximum 4000 mg Tylenol/acetaminophen daily from all sources. 60 tablet 0    ibuprofen 200 MG Oral Tab Take 3 tablets (600 mg total) by mouth every 8 (eight) hours as needed. Take with food.  Stop if stomach upset. 60 tablet 0    aspirin 81 MG Oral Tab EC Take 1 tablet (81 mg total) by mouth 2 (two) times daily with meals. Start dinner tonight then breakfast/dinner for 12 days. 24 tablet 0    semaglutide (OZEMPIC, 0.25 OR 0.5 MG/DOSE,) 2 MG/3ML Subcutaneous Solution Pen-injector Inject 0.5 mg into the skin once a week. 9 mL 1     Allergies   Allergen Reactions    Cyclobenzaprine OTHER (SEE COMMENTS)     Pt stated it makes her \"shaky.\"    Raisins RASH     Family History   Problem Relation Age of Onset    Hypertension Father     Lipids Father     Diabetes Father     Hypertension Mother     Lipids Mother     Diabetes Mother     Ovarian Cancer Mother 49     No family Hx of DVT/PE    Social History     Occupational History    Not on file   Tobacco Use    Smoking status: Some Days     Current packs/day: 0.15     Average packs/day: 0.2 packs/day for 24.0 years (3.6 ttl pk-yrs)     Types: Cigarettes     Passive exposure: Never    Smokeless tobacco: Never   Vaping Use    Vaping status: Never Used   Substance and Sexual Activity    Alcohol use: Not Currently     Comment: occassion    Drug use: Never    Sexual activity: Not on file        Review of Systems:  GENERAL: feels generally well, no significant weight loss or weight gain  SKIN: no ulcerated or worrisome skin lesions  EYES:denies blurred vision or double vision  HEENT: denies new nasal congestion, sinus pain or ST  LUNGS: denies shortness of breath  CARDIOVASCULAR: denies chest pain  GI: no hematemesis, no worsening heartburn, no diarrhea  : no dysuria, no blood in urine, no difficulty urinating, no incontinence  MUSCULOSKELETAL: no other musculoskeletal complaints other than in HPI  NEURO: no numbness or tingling, no weakness or balance disorder  PSYCHE: no depression or anxiety  HEMATOLOGIC: no hx of blood dyscrasia, no Hx DVT/PE  ENDOCRINE: no thyroid or diabetes issues  ALL/ASTHMA: no new hx of severe allergy or asthma    Physical Examination:    LMP  07/29/2024 (Approximate)   Constitutional: appears well hydrated, alert and responsive, no acute distress noted  Extremities: Lower extremity skin healthy, no pitting edema.  Calf soft nontender.  Incisions healed cosmetically.  Musculoskeletal: Right motion 0 to 120 degrees, no instability.  Tenderness to medial joint line.  No tenderness to lateral joint line.  Able to straight leg raise against resistance.  Neurological: Motor and sensory function intact.    Imaging: None taken today.      US VENOUS DOPPLER LEG RIGHT - DIAG IMG (CPT=93971)    Result Date: 9/6/2024  PROCEDURE: US VENOUS DOPPLER LEG RIGHT-DIAG IMG (CPT=93971)  COMPARISON: None.  INDICATIONS: Pain and swelling of right lower leg. Pain and swelling of right lower leg. S/P right knee arthroscopy  TECHNIQUE: Color duplex Doppler venous ultrasound of the right lower extremity was performed in the usual manner.  FINDINGS: The femoral and popliteal veins appear normal.  Normal flow was demonstrated with color and pulsed Doppler.  Visualized portions of the great and small saphenous, posterior tibial, and peroneal veins appear normal.   THROMBI: None visible. COMPRESSIBILITY: Normal. OTHER: Negative.         CONCLUSION: Normal examination.     Dictated by (CST): Rick Rooney MD on 9/06/2024 at 4:03 PM     Finalized by (CST): Rick Rooney MD on 9/06/2024 at 4:04 PM             Lab Results   Component Value Date    WBC 10.1 08/08/2024    HGB 12.6 08/08/2024    .0 08/08/2024      Lab Results   Component Value Date     (H) 08/01/2024    BUN 9 08/01/2024    CREATSERUM 0.64 08/01/2024    GFRNAA 108 03/05/2021    GFRAA 124 03/05/2021        Assessment and Plan:  Diagnoses and all orders for this visit:    S/P right knee arthroscopy  -     Physical Therapy Referral - [facility]        Assessment: As above    Plan: Discussed with patient I will give her a new prescription for physical therapy.  I discussed with her for her swelling she  should use compression stockings during the day, but do not wear them at night.  At her last visit I ordered an ultrasound of her right lower extremity which showed negative for DVT.  Discussed that she can continue taking tramadol as needed, but should slowly transition to Tylenol.    Patient works as a patient care tech.  She wants to return to work on October 5.  But is unsure if she will be ready to lift and push pull patients.  We will give her a return to work note on October 5 with the light duty restrictions no lifting more than 20 pounds.    We will see the patient in 6 weeks.  If she is improving, we will likely release her at full duty.  If she is not improving, we will discuss possibly cortisone injections or a new MRI.    Follow Up: No follow-ups on file.    PETRONA Kramer

## 2024-09-22 ENCOUNTER — TELEPHONE (OUTPATIENT)
Dept: ADMINISTRATIVE | Age: 44
End: 2024-09-22

## 2024-09-22 DIAGNOSIS — S83.231D COMPLEX TEAR OF MEDIAL MENISCUS, CURRENT INJURY, RIGHT KNEE, SUBSEQUENT ENCOUNTER: ICD-10-CM

## 2024-09-23 RX ORDER — TRAMADOL HYDROCHLORIDE 50 MG/1
50 TABLET ORAL EVERY 8 HOURS PRN
Qty: 90 TABLET | Refills: 0 | Status: SHIPPED | OUTPATIENT
Start: 2024-09-23

## 2024-09-23 NOTE — TELEPHONE ENCOUNTER
Please review; protocol failed/ has no protocol        Simran Reddy, RN6 minutes ago (2:34 PM)     AR  Patient called in and states she will be out of medication as of today, marking as high priority           Recent fills: 9/16/2024,08/19/2024,08/13/2024  Last Rx written: 08/20/2024( 7 day supply sent on 08/20/2024 patient picked up 09/16/2024 patient reported )  Last Office Visit: 07/18/2024    Recent Visits  Date Type Provider Dept   07/18/24 Office Visit Davina Angeles PA-C [facility]       Requested Prescriptions   Pending Prescriptions Disp Refills    TRAMADOL 50 MG Oral Tab [Pharmacy Med Name: Tramadol Hydrochloride 50 Mg Tab Unic] 90 tablet 0     Sig: Take 1 tablet (50 mg total) by mouth every 8 (eight) hours as needed for Pain. No alcohol or driving on this med. Stop if lethargic or hallucinating.       Controlled Substance Medication Failed - 9/23/2024  2:34 PM        Failed - This medication is a controlled substance - forward to provider to refill           Recent Outpatient Visits              6 days ago S/P right knee arthroscopy    [facility] Lakeisha Burns PA    Office Visit    1 month ago Degenerative tear of medial meniscus of right knee    [facility] Lakeisha Burns PA    Office Visit    2 months ago Pre-operative examination    [facility] [address], [place] Davina Angeles PA-C    Office Visit    3 months ago Pre-operative examination    [facility], [address], [place] Davina Angeles PA-C    Office Visit    4 months ago Pre-op examination    [facility], [address], [place] Davina Angeles PA-C    Office Visit          Future Appointments         Provider Department Appt Notes    In 1 week [facility] DEXA RM1; [facility] JOAN RM1 [facility] [place]     In 1 month Lakeisha Burns PA [facility]  6 WEEK F/U APPT

## 2024-09-23 NOTE — TELEPHONE ENCOUNTER
Fitness of duty forms received and logged for processing, medical records request also received, sent mychart for dougie and fcr

## 2024-09-23 NOTE — TELEPHONE ENCOUNTER
Patient called in and states she will be out of medication as of today, marking as high priority

## 2024-09-24 NOTE — TELEPHONE ENCOUNTER
Dr. Duffy,    Please sign off on form if you agree to: Fitness for duty on 10/5/24 light duty, no lifting over 20lbs pending re-eval 10/29/24    -Signature page will be the first page scanned  -From your Inbasket, Highlight the patient and click Chart   -Double click the 9/22/24 Forms Completion telephone encounter  -Scroll down to the Media section   -Click the blue Hyperlink:[employer] for duty, Dr. Duffy, 9/24/24    -Click Acknowledge located in the top right ribbon/menu   -Drag the mouse into the blank space of the document and a + sign will appear. Left click to   electronically sign the document.  -Once signed, simply exit out of the screen and you signature will be saved.     Thank you,  Josy ARMIJO

## 2024-10-21 DIAGNOSIS — S83.231D COMPLEX TEAR OF MEDIAL MENISCUS, CURRENT INJURY, RIGHT KNEE, SUBSEQUENT ENCOUNTER: ICD-10-CM

## 2024-10-21 RX ORDER — TRAMADOL HYDROCHLORIDE 50 MG/1
50 TABLET ORAL EVERY 8 HOURS PRN
Qty: 90 TABLET | Refills: 0 | Status: CANCELLED | OUTPATIENT
Start: 2024-10-21

## 2024-10-22 DIAGNOSIS — S83.231D COMPLEX TEAR OF MEDIAL MENISCUS, CURRENT INJURY, RIGHT KNEE, SUBSEQUENT ENCOUNTER: ICD-10-CM

## 2024-10-23 RX ORDER — TRAMADOL HYDROCHLORIDE 50 MG/1
50 TABLET ORAL EVERY 8 HOURS PRN
Qty: 90 TABLET | Refills: 0 | Status: SHIPPED | OUTPATIENT
Start: 2024-10-23

## 2024-10-23 NOTE — TELEPHONE ENCOUNTER
Please review. Protocol Failed; No Protocol      Recent fills: 8/19/2024 quantity 90, 9/16/2024 quantity 20, 9/23/2024 quantity 90  Last Rx written: 9/23/2024  Last office visit: 7/18/2024          Requested Prescriptions   Pending Prescriptions Disp Refills    TRAMADOL 50 MG Oral Tab [Pharmacy Med Name: Tramadol Hydrochloride 50 Mg Tab Unic] 90 tablet 0     Sig: Take 1 tablet (50 mg total) by mouth every 8 (eight) hours as needed for Pain. No alcohol or driving on this med. Stop if lethargic or hallucinating.       Controlled Substance Medication Failed - 10/23/2024  9:20 AM        Failed - This medication is a controlled substance - forward to provider to refill               Future Appointments         Provider Department Appt Notes    In 6 days Lakeisha Burns PA [facility] 6 WEEK F/U APPT    In 3 weeks LMB DEXA RM1; [facility] JOAN RM1 [facility] [place]           Recent Outpatient Visits              [facility]    Office Visit    2 months ago [facility]ener[facility] medial [facility]iscus [facility]    [facility] Visit    3 months ago [facility]    Office Visit    4 months ago [facility]operative examination    [facility]    Office Visit    5 months ago [facility]-op examination    [facility] [place] [facility]    Office Visit

## 2024-10-31 ENCOUNTER — TELEPHONE (OUTPATIENT)
Dept: FAMILY MEDICINE CLINIC | Facility: CLINIC | Age: 44
End: 2024-10-31

## 2024-10-31 DIAGNOSIS — Z12.11 SCREENING FOR MALIGNANT NEOPLASM OF COLON: Primary | ICD-10-CM

## 2024-10-31 NOTE — TELEPHONE ENCOUNTER
Patient sent a message on [employer] requesting an appointment for the following    Colorectal Cancer Screening

## 2024-10-31 NOTE — TELEPHONE ENCOUNTER
Message left referrall is ready    Scheduling Information:  Please call [phone] to schedule a telephone screening appointment prior to your procedure.  _______________________________________________________  Referred to Provider Information:  Provider Address Phone   Gumaro Borrero MD [address] [address] [phone]

## 2024-11-05 ENCOUNTER — HOSPITAL ENCOUNTER (OUTPATIENT)
Dept: GENERAL RADIOLOGY | Facility: HOSPITAL | Age: 44
Discharge: HOME | End: 2024-11-05
Payer: COMMERCIAL

## 2024-11-05 ENCOUNTER — OFFICE VISIT (OUTPATIENT)
Dept: ORTHOPEDICS CLINIC | Facility: CLINIC | Age: 44
End: 2024-11-05
Payer: COMMERCIAL

## 2024-11-05 DIAGNOSIS — Z98.890 S/P RIGHT KNEE ARTHROSCOPY: ICD-10-CM

## 2024-11-05 DIAGNOSIS — M25.561 CHRONIC PAIN OF RIGHT KNEE: ICD-10-CM

## 2024-11-05 DIAGNOSIS — Z98.890 S/P RIGHT KNEE ARTHROSCOPY: Primary | ICD-10-CM

## 2024-11-05 DIAGNOSIS — M23.203 DEGENERATIVE TEAR OF MEDIAL MENISCUS OF RIGHT KNEE: ICD-10-CM

## 2024-11-05 DIAGNOSIS — G89.29 CHRONIC PAIN OF RIGHT KNEE: ICD-10-CM

## 2024-11-05 PROCEDURE — 73564 X-RAY EXAM KNEE 4 OR MORE: CPT

## 2024-11-05 PROCEDURE — 99024 POSTOP FOLLOW-UP VISIT: CPT

## 2024-11-05 NOTE — PROGRESS NOTES
NURSING INTAKE COMMENTS:   Chief Complaint   Patient presents with    Post-Op     S/p R knee arthroscopy sx on 8/09/2024- rates pain 7/10 on and off       HPI: This 44 year old female presents today with her daughter approximately 3 months status post right knee arthroscopy with partial medial meniscectomy and medial synovectomy.  Patient reports since the last time she is seen as she feels like she is gotten worse rather than getting better.  She is returned to work, she is able to view it does hurt her.  She is to continues to feel pain and \"cracks\" in her knee from time to time.  She denies any numbness or tingling.  Denies any fever, chills, night sweats.    Past Medical History:    Diabetes (HCC)    Essential hypertension    High blood pressure    High cholesterol    Osteoarthritis     Past Surgical History:   Procedure Laterality Date    Arthroscopy of joint unlisted      Arthrotomy,open repair meniscus Right     2015    Hc epidural anesthesia      labor    Other surgical history      Abdominal Abscess surgery     Current Outpatient Medications   Medication Sig Dispense Refill    traMADol 50 MG Oral Tab Take 1 tablet (50 mg total) by mouth every 8 (eight) hours as needed for Pain. No alcohol or driving on this med. Stop if lethargic or hallucinating. 90 tablet 0    acetaminophen 500 MG Oral Tab Take 2 tablets (1,000 mg total) by mouth every 8 (eight) hours as needed for Pain. Maximum 4000 mg Tylenol/acetaminophen daily from all sources. 60 tablet 0    ibuprofen 200 MG Oral Tab Take 3 tablets (600 mg total) by mouth every 8 (eight) hours as needed. Take with food. Stop if stomach upset. 60 tablet 0    aspirin 81 MG Oral Tab EC Take 1 tablet (81 mg total) by mouth 2 (two) times daily with meals. Start dinner tonight then breakfast/dinner for 12 days. 24 tablet 0    semaglutide (OZEMPIC, 0.25 OR 0.5 MG/DOSE,) 2 MG/3ML Subcutaneous Solution Pen-injector Inject 0.5 mg into the skin once a week. 9 mL 1      Allergies[1]  Family History   Problem Relation Age of Onset    Hypertension Father     Lipids Father     Diabetes Father     Hypertension Mother     Lipids Mother     Diabetes Mother     Ovarian Cancer Mother 49     No family Hx of DVT/PE    Social History     Occupational History    Not on file   Tobacco Use    Smoking status: Some Days     Current packs/day: 0.15     Average packs/day: 0.2 packs/day for 24.0 years (3.6 ttl pk-yrs)     Types: Cigarettes     Passive exposure: Never    Smokeless tobacco: Never   Vaping Use    Vaping status: Never Used   Substance and Sexual Activity    Alcohol use: Not Currently     Comment: occassion    Drug use: Never    Sexual activity: Not on file        Review of Systems:  GENERAL: feels generally well, no significant weight loss or weight gain  SKIN: no ulcerated or worrisome skin lesions  EYES:denies blurred vision or double vision  HEENT: denies new nasal congestion, sinus pain or ST  LUNGS: denies shortness of breath  CARDIOVASCULAR: denies chest pain  GI: no hematemesis, no worsening heartburn, no diarrhea  : no dysuria, no blood in urine, no difficulty urinating, no incontinence  MUSCULOSKELETAL: no other musculoskeletal complaints other than in HPI  NEURO: no numbness or tingling, no weakness or balance disorder  PSYCHE: no depression or anxiety  HEMATOLOGIC: no hx of blood dyscrasia, no Hx DVT/PE  ENDOCRINE: no thyroid or diabetes issues  ALL/ASTHMA: no new hx of severe allergy or asthma    Physical Examination:    LMP 07/29/2024 (Approximate)   Constitutional: appears well hydrated, alert and responsive, no acute distress noted  Extremities: Lower extremity skin healthy, no pain edema.  Calf soft nontender.  Incision healed cosmetically.  Musculoskeletal: Right knee range of motion 0 to 120 degrees, no instability.  Tenderness to medial joint line.  No tenderness to lateral joint line.  Able to straight leg raise against resistance  Neurological: Motor and  sensory function intact.    Imaging: None taken today.      No results found.     Lab Results   Component Value Date    WBC 10.1 08/08/2024    HGB 12.6 08/08/2024    .0 08/08/2024      Lab Results   Component Value Date     (H) 08/01/2024    BUN 9 08/01/2024    CREATSERUM 0.64 08/01/2024    GFRNAA 108 03/05/2021    GFRAA 124 03/05/2021        Assessment and Plan:  Diagnoses and all orders for this visit:    S/P right knee arthroscopy  -     XR KNEE, COMPLETE (4 OR MORE VIEWS), RIGHT (CPT=73564); Future  -     MRI KNEE, RIGHT (HVS=75898); Future    Degenerative tear of medial meniscus of right knee  -     XR KNEE, COMPLETE (4 OR MORE VIEWS), RIGHT (CPT=73564); Future  -     MRI KNEE, RIGHT (TPA=99755); Future    Chronic pain of right knee  -     XR KNEE, COMPLETE (4 OR MORE VIEWS), RIGHT (CPT=73564); Future  -     MRI KNEE, RIGHT (CIB=07158); Future        Assessment: As above    Plan: For the patient's continued pain we will order x-rays of the right knee with a Scearce view.  Discussed with her this will allow us to see if her cartilage has collapsed after her knee scope.  I discussed with her that after the x-rays I would call her to discuss our next plan of action.  If she still has good cartilage space we will order a right knee MRI.  If she has progressed arthritis and degenerative changes, we will then discuss injections versus possible knee replacement.  She agreed to this plan.    Patient will obtain x-rays of the right knee with Scearce view.  I will call her after on the telephone to discuss next plans.    Addendum: Patient is a patient after office she was able to go downstairs and get immediate x-rays.  I was able to view them and call her.  She does have some mild joint space narrowing, with the series view it still looks like she has good joint space.  Due to this I placed an order for an MRI in the chart.  She will follow-up in office after the MRI for further recommendations.    Follow  Up: No follow-ups on file.    PETRONA Kramer         [1]   Allergies  Allergen Reactions    Cyclobenzaprine OTHER (SEE COMMENTS)     Pt stated it makes her \"shaky.\"    Michele RASH

## 2024-11-20 ENCOUNTER — OFFICE VISIT (OUTPATIENT)
Dept: FAMILY MEDICINE CLINIC | Facility: CLINIC | Age: 44
End: 2024-11-20

## 2024-11-20 ENCOUNTER — LAB ENCOUNTER (OUTPATIENT)
Dept: LAB | Age: 44
End: 2024-11-20
Attending: PHYSICIAN ASSISTANT
Payer: COMMERCIAL

## 2024-11-20 VITALS
HEIGHT: 66 IN | SYSTOLIC BLOOD PRESSURE: 162 MMHG | DIASTOLIC BLOOD PRESSURE: 94 MMHG | HEART RATE: 89 BPM | BODY MASS INDEX: 36.48 KG/M2 | WEIGHT: 227 LBS

## 2024-11-20 DIAGNOSIS — E11.9 TYPE 2 DIABETES MELLITUS WITHOUT COMPLICATION, WITHOUT LONG-TERM CURRENT USE OF INSULIN (HCC): Primary | ICD-10-CM

## 2024-11-20 DIAGNOSIS — R82.90 ABNORMAL URINE ODOR: ICD-10-CM

## 2024-11-20 DIAGNOSIS — E11.9 TYPE 2 DIABETES MELLITUS WITHOUT COMPLICATION, WITHOUT LONG-TERM CURRENT USE OF INSULIN (HCC): ICD-10-CM

## 2024-11-20 DIAGNOSIS — S83.231D COMPLEX TEAR OF MEDIAL MENISCUS, CURRENT INJURY, RIGHT KNEE, SUBSEQUENT ENCOUNTER: ICD-10-CM

## 2024-11-20 DIAGNOSIS — I10 ESSENTIAL HYPERTENSION: ICD-10-CM

## 2024-11-20 LAB
ALBUMIN SERPL-MCNC: 4.8 G/DL (ref 3.2–4.8)
ALBUMIN/GLOB SERPL: 1.4 {RATIO} (ref 1–2)
ALP LIVER SERPL-CCNC: 103 U/L
ALT SERPL-CCNC: 37 U/L
ANION GAP SERPL CALC-SCNC: 9 MMOL/L (ref 0–18)
AST SERPL-CCNC: 36 U/L (ref ?–34)
BILIRUB SERPL-MCNC: 0.5 MG/DL (ref 0.3–1.2)
BILIRUBIN: NEGATIVE
BUN BLD-MCNC: 11 MG/DL (ref 9–23)
BUN/CREAT SERPL: 17.5 (ref 10–20)
CALCIUM BLD-MCNC: 9.5 MG/DL (ref 8.7–10.4)
CHLORIDE SERPL-SCNC: 103 MMOL/L (ref 98–112)
CHOLEST SERPL-MCNC: 153 MG/DL (ref ?–200)
CO2 SERPL-SCNC: 27 MMOL/L (ref 21–32)
CREAT BLD-MCNC: 0.63 MG/DL
EGFRCR SERPLBLD CKD-EPI 2021: 111 ML/MIN/1.73M2 (ref 60–?)
EST. AVERAGE GLUCOSE BLD GHB EST-MCNC: 206 MG/DL (ref 68–126)
FASTING PATIENT LIPID ANSWER: YES
FASTING STATUS PATIENT QL REPORTED: YES
GLOBULIN PLAS-MCNC: 3.5 G/DL (ref 2–3.5)
GLUCOSE (URINE DIPSTICK): NEGATIVE MG/DL
GLUCOSE BLD-MCNC: 166 MG/DL (ref 70–99)
HBA1C MFR BLD: 8.8 % (ref ?–5.7)
HDLC SERPL-MCNC: 42 MG/DL (ref 40–59)
KETONES (URINE DIPSTICK): NEGATIVE MG/DL
LDLC SERPL CALC-MCNC: 89 MG/DL (ref ?–100)
MULTISTIX LOT#: ABNORMAL NUMERIC
NITRITE, URINE: NEGATIVE
NONHDLC SERPL-MCNC: 111 MG/DL (ref ?–130)
OSMOLALITY SERPL CALC.SUM OF ELEC: 291 MOSM/KG (ref 275–295)
PH, URINE: 6 (ref 4.5–8)
POTASSIUM SERPL-SCNC: 3.7 MMOL/L (ref 3.5–5.1)
PROT SERPL-MCNC: 8.3 G/DL (ref 5.7–8.2)
PROTEIN (URINE DIPSTICK): 30 MG/DL
SODIUM SERPL-SCNC: 139 MMOL/L (ref 136–145)
SPECIFIC GRAVITY: 1.03 (ref 1–1.03)
TRIGL SERPL-MCNC: 120 MG/DL (ref 30–149)
UROBILINOGEN,SEMI-QN: 0.2 MG/DL (ref 0–1.9)
VLDLC SERPL CALC-MCNC: 19 MG/DL (ref 0–30)

## 2024-11-20 PROCEDURE — 99213 OFFICE O/P EST LOW 20 MIN: CPT | Performed by: PHYSICIAN ASSISTANT

## 2024-11-20 PROCEDURE — 80061 LIPID PANEL: CPT

## 2024-11-20 PROCEDURE — 81003 URINALYSIS AUTO W/O SCOPE: CPT | Performed by: PHYSICIAN ASSISTANT

## 2024-11-20 PROCEDURE — 36415 COLL VENOUS BLD VENIPUNCTURE: CPT

## 2024-11-20 PROCEDURE — 80053 COMPREHEN METABOLIC PANEL: CPT

## 2024-11-20 PROCEDURE — 83036 HEMOGLOBIN GLYCOSYLATED A1C: CPT

## 2024-11-20 RX ORDER — METFORMIN HYDROCHLORIDE 500 MG/1
500 TABLET, EXTENDED RELEASE ORAL 2 TIMES DAILY WITH MEALS
Qty: 180 TABLET | Refills: 3 | Status: SHIPPED | OUTPATIENT
Start: 2024-11-20 | End: 2025-02-18

## 2024-11-20 RX ORDER — TRAMADOL HYDROCHLORIDE 50 MG/1
50 TABLET ORAL EVERY 8 HOURS PRN
Qty: 90 TABLET | Refills: 0 | Status: SHIPPED | OUTPATIENT
Start: 2024-11-20

## 2024-11-20 RX ORDER — LOSARTAN POTASSIUM 100 MG/1
100 TABLET ORAL DAILY
Qty: 90 TABLET | Refills: 3 | Status: SHIPPED | OUTPATIENT
Start: 2024-11-20 | End: 2025-02-18

## 2024-11-20 RX ORDER — SEMAGLUTIDE 0.68 MG/ML
0.25 INJECTION, SOLUTION SUBCUTANEOUS WEEKLY
Qty: 3 ML | Refills: 0 | Status: SHIPPED | OUTPATIENT
Start: 2024-11-20 | End: 2024-12-20

## 2024-11-20 NOTE — PROGRESS NOTES
HPI:     HPI  A 44-year-old female is in the office complaining of a cloudy color in her urine for the past 2 days. She ran out of HTN medication for the past week. Her right knee pain persists.  She denies fever, urinary frequency, dysuria, blood in urine, or abdominal pain.      Medications:     Current Outpatient Medications   Medication Sig Dispense Refill    losartan 100 MG Oral Tab Take 1 tablet (100 mg total) by mouth daily. 90 tablet 3    metFORMIN  MG Oral Tablet 24 Hr Take 1 tablet (500 mg total) by mouth 2 (two) times daily with meals. 180 tablet 3    semaglutide (OZEMPIC, 0.25 OR 0.5 MG/DOSE,) 2 MG/3ML Subcutaneous Solution Pen-injector Inject 0.25 mg into the skin once a week. 3 mL 0    traMADol 50 MG Oral Tab Take 1 tablet (50 mg total) by mouth every 8 (eight) hours as needed for Pain. No alcohol or driving on this med. Stop if lethargic or hallucinating. 90 tablet 0    acetaminophen 500 MG Oral Tab Take 2 tablets (1,000 mg total) by mouth every 8 (eight) hours as needed for Pain. Maximum 4000 mg Tylenol/acetaminophen daily from all sources. 60 tablet 0    ibuprofen 200 MG Oral Tab Take 3 tablets (600 mg total) by mouth every 8 (eight) hours as needed. Take with food. Stop if stomach upset. 60 tablet 0    aspirin 81 MG Oral Tab EC Take 1 tablet (81 mg total) by mouth 2 (two) times daily with meals. Start dinner tonight then breakfast/dinner for 12 days. 24 tablet 0       Allergies:   Allergies[1]    History:     Health Maintenance   Topic Date Due    Diabetes Care Dilated Eye Exam  Never done    Colorectal Cancer Screening  Never done    Mammogram  10/21/2022    Annual Depression Screening  Never done    Tobacco Cessation Counseling  Never done    COVID-19 Vaccine (4 - 2024-25 season) 09/01/2024    Influenza Vaccine (1) 10/01/2024    Diabetes Care A1C  02/01/2025    Diabetes Care Foot Exam  04/19/2025    Diabetes Care: Microalb/Creat Ratio  04/19/2025    Annual Physical  04/19/2025    Diabetes  Care: GFR  08/01/2025    DTaP,Tdap,and Td Vaccines (3 - Td or Tdap) 11/03/2026    Pap Smear  04/19/2027    Pneumococcal Vaccine: Birth to 64yrs  Completed       Patient's last menstrual period was 10/20/2024 (approximate).   Past Medical History:     Past Medical History:    Diabetes (HCC)    Essential hypertension    High blood pressure    High cholesterol    Osteoarthritis       Past Surgical History:     Past Surgical History:   Procedure Laterality Date    Arthroscopy of joint unlisted      Arthrotomy,open repair meniscus Right     2015     epidural anesthesia      labor    Other surgical history      Abdominal Abscess surgery       Family History:     Family History   Problem Relation Age of Onset    Hypertension Father     Lipids Father     Diabetes Father     Hypertension Mother     Lipids Mother     Diabetes Mother     Ovarian Cancer Mother 49       Social History:     Social History     Socioeconomic History    Marital status: Single     Spouse name: Not on file    Number of children: Not on file    Years of education: Not on file    Highest education level: Not on file   Occupational History    Not on file   Tobacco Use    Smoking status: Some Days     Current packs/day: 0.15     Average packs/day: 0.2 packs/day for 24.0 years (3.6 ttl pk-yrs)     Types: Cigarettes     Passive exposure: Never    Smokeless tobacco: Never   Vaping Use    Vaping status: Never Used   Substance and Sexual Activity    Alcohol use: Not Currently     Comment: occassion    Drug use: Never    Sexual activity: Not on file   Other Topics Concern    Not on file   Social History Narrative    Not on file     Social Drivers of Health     Financial Resource Strain: Not on file   Food Insecurity: No Food Insecurity (2/24/2024)    [facility]    Hunger Vital Sign     Worried About Running Out of Food in the Last Year: Never true     Ran Out of Food in the Last Year: Never true    Transportation Needs: No Transportation Needs (2/24/2024)    [facility]    PRAPARE - Transportation     Lack of Transportation (Medical): No     Lack of Transportation (Non-Medical): No   Physical Activity: Not on file   Stress: Not on file   Social Connections: Not on file   Housing Stability: Unknown (2/24/2024)    Received from [facility]    Housing Stability Vital Sign     Unable to Pay for Housing in the Last Year: No     Number of Places Lived in the Last Year: Not on file     Unstable Housing in the Last Year: No       Review of Systems:   Review of Systems   Genitourinary:  Negative for dysuria, flank pain, frequency and hematuria.        Vitals:    11/20/24 1153 11/20/24 1206   BP: (!) 159/100 (!) 162/94   Pulse: 89    Weight: 227 lb (103 kg)    Height: 5' 6\" (1.676 m)      Body mass index is 36.64 kg/m².    Physical Exam:   Physical Exam  Vitals reviewed.   Cardiovascular:      Rate and Rhythm: Normal rate and regular rhythm.      Heart sounds: Normal heart sounds.   Pulmonary:      Effort: Pulmonary effort is normal.      Breath sounds: Normal breath sounds.   Abdominal:      General: Abdomen is flat. Bowel sounds are normal. There is no distension.      Palpations: Abdomen is soft.      Tenderness: There is no abdominal tenderness. There is no right CVA tenderness or left CVA tenderness.          Assessment and Plan::     Problem List Items Addressed This Visit          HCC Problems    Type 2 diabetes mellitus without complication, without long-term current use of insulin (HCC) - Primary    Relevant Medications        metFORMIN  MG Oral Tablet 24 Hr    semaglutide (OZEMPIC, 0.25 OR 0.5 MG/DOSE,) 2 MG/3ML Subcutaneous Solution Pen-injector    Other Relevant Orders    Comp Metabolic Panel (14) (Completed)    Hemoglobin A1C (Completed)    Lipid Panel (Completed)    Ophthalmology Referral -  In Network    Continue with Metformin  mg BID. Resume Ozempic 0.25 mg Qweek for 4 weeks, then increase to 0.5 mg Qweek.         Cardiac and Vasculature    Essential hypertension    Relevant Medications    losartan 100 MG Oral Tab       Musculoskeletal and Injuries    Complex tear of medial meniscus, current injury, right knee, subsequent encounter    Relevant Medications    traMADol 50 MG Oral Tab     Other Visit Diagnoses       Abnormal urine odor        Relevant Medications        Other Relevant Orders    POC Urinalysis, Automated Dip without microscopy (PCA and EMMG ONLY) [09169] (Completed)    Urine Culture, Routine [E]          Discussed plan of care with pt and pt is in agreement.All questions answered. Pt to call with questions or concerns.         [1]   Allergies  Allergen Reactions    Cyclobenzaprine OTHER (SEE COMMENTS)     Pt stated it makes her \"shaky.\"    Raisins RASH

## 2024-12-09 ENCOUNTER — HOSPITAL ENCOUNTER (OUTPATIENT)
Dept: MRI IMAGING | Age: 44
Discharge: HOME | End: 2024-12-09
Payer: COMMERCIAL

## 2024-12-09 DIAGNOSIS — M25.561 CHRONIC PAIN OF RIGHT KNEE: ICD-10-CM

## 2024-12-09 DIAGNOSIS — M23.203 DEGENERATIVE TEAR OF MEDIAL MENISCUS OF RIGHT KNEE: ICD-10-CM

## 2024-12-09 DIAGNOSIS — G89.29 CHRONIC PAIN OF RIGHT KNEE: ICD-10-CM

## 2024-12-09 DIAGNOSIS — Z98.890 S/P RIGHT KNEE ARTHROSCOPY: ICD-10-CM

## 2024-12-09 PROCEDURE — 73721 MRI JNT OF LWR EXTRE W/O DYE: CPT

## 2024-12-19 ENCOUNTER — TELEPHONE (OUTPATIENT)
Dept: FAMILY MEDICINE CLINIC | Facility: CLINIC | Age: 44
End: 2024-12-19

## 2024-12-19 DIAGNOSIS — S83.231D COMPLEX TEAR OF MEDIAL MENISCUS, CURRENT INJURY, RIGHT KNEE, SUBSEQUENT ENCOUNTER: ICD-10-CM

## 2024-12-21 ENCOUNTER — PATIENT MESSAGE (OUTPATIENT)
Dept: FAMILY MEDICINE CLINIC | Facility: CLINIC | Age: 44
End: 2024-12-21

## 2024-12-22 NOTE — TELEPHONE ENCOUNTER
YovanaJosy oropeza, APRN       12/20/24  7:45 PM  Note      On call note-Pt calling w c/o of abdominal pain. States that she was in to see [facility] 11/20 and was found to have a uti.  Pt was advised to take bactrim and prescription/s sent to pharmacy. Pt states she went out of town and never picked up antibiotics.      Pt states she is also having cloudy, foul smelling urine in addition to abdominal pain.     Pt advised that she needs to go to ER for further evaluation as she may have kidney infection or other cause for abdominal pain.   Pt states understanding.  Declined request for antibiotics and pain medications and reiterated need for ER evaluation.

## 2024-12-23 RX ORDER — TRAMADOL HYDROCHLORIDE 50 MG/1
TABLET ORAL
Qty: 90 TABLET | Refills: 0 | OUTPATIENT
Start: 2024-12-23

## 2024-12-23 RX ORDER — TRAMADOL HYDROCHLORIDE 50 MG/1
50 TABLET ORAL EVERY 8 HOURS PRN
Qty: 90 TABLET | Refills: 0 | Status: SHIPPED | OUTPATIENT
Start: 2024-12-23 | End: 2025-01-22

## 2024-12-23 NOTE — TELEPHONE ENCOUNTER
Please review; protocol failed/No Protocol    Recent Fills: 09/23/2024, 10/23/2024, 11/20/2024    Last Rx Written: 11/20/2024    Last Office Visit: 11/20/2024    Requested Prescriptions   Pending Prescriptions Disp Refills    traMADol 50 MG Oral Tab 90 tablet 0     Sig: Take 1 tablet (50 mg total) by mouth every 8 (eight) hours as needed for Pain. No alcohol or driving on this med. Stop if lethargic or hallucinating.       Controlled Substance Medication Failed - 12/23/2024  7:59 AM        Failed - This medication is a controlled substance - forward to provider to refill           Future Appointments         Provider Department Appt Notes    In 2 weeks LMB DEXA RM1; L[facility] JOAN RM1 [facility] [place]     In 5 months Андрей Carpio MD [facility] ([place], [place]) Weight loss          Recent Outpatient Visits              1 month ago Type 2 diabetes mellitus without complication, without long-term current use of insulin (HCC)    [facility], [address], [place] Davina Angeles PA-C    Office Visit    1 month ago [facility]    Office Visit    3 months ago S[facility]P right knee [facility]rosc[facility]    Office Visit    4 months ago Degenerative tear of medial meniscus of right knee    [facility]    Office Visit    5 months ago Pre-operative examination    [facility], [address], [place] Davina Angeles PA-C    Office Visit

## 2024-12-23 NOTE — TELEPHONE ENCOUNTER
Spoke to patient, full name and date of birth verified.  Patient calling because she is completely out of Tramadol for her right knee pain.   Patient was informed that provider is out of the office today.   Patient confirmed pharmacy on file is correct.

## 2025-01-03 NOTE — TELEPHONE ENCOUNTER
Performing Laboratory: [phone] Repending for  Davina Angeles PA-C as patient has not been under Dr. Escalante care    **Bill: Please confirm Sig and Quantity to be dispensed. Thank you    *Prior to surgery- Bill was managing this medication for patient for over one year    Did inform patient that she may need an appointment to further discuss medication/refills depending on Davina Angeles PA-C disposition    Patient verbalized understanding. Patient made aware that Davina Angeles PA-C is not in the office today and will return tomorrow   Billing Type: Third-Party Bill Bill For Surgical Tray: no Clinical Notes (To The Lab): Standing monthly order for 6 months (1/2025-7/2025) Lab Facility: 0 Expected Date Of Service: 01/03/2025

## 2025-01-09 DIAGNOSIS — E11.9 TYPE 2 DIABETES MELLITUS WITHOUT COMPLICATION, WITHOUT LONG-TERM CURRENT USE OF INSULIN (HCC): ICD-10-CM

## 2025-01-14 RX ORDER — SEMAGLUTIDE 0.68 MG/ML
0.5 INJECTION, SOLUTION SUBCUTANEOUS WEEKLY
Qty: 9 ML | Refills: 1 | Status: SHIPPED | OUTPATIENT
Start: 2025-01-14

## 2025-01-14 NOTE — TELEPHONE ENCOUNTER
Protocol Failed/ No Protocol    Requested Prescriptions   Pending Prescriptions Disp Refills    semaglutide (OZEMPIC, 0.25 OR 0.5 MG/DOSE,) 2 MG/3ML Subcutaneous Solution Pen-injector 3 mL 0     Sig: Inject 0.25 mg into the skin once a week.       Diabetes Medication Protocol Failed - 1/13/2025 11:07 PM        Failed - Last A1C < 7.5 and within past 6 months     Lab Results   Component Value Date    A1C 8.8 (H) 11/20/2024             Passed - In person appointment or virtual visit in the past 6 mos or appointment in next 3 mos     Recent Outpatient Visits              1 month ago Type 2 diabetes mellitus without complication, without long-term current use of insulin ([facility])    [facility], [address], [place] Davina Angeles PA-C    Office Visit    2 months ago S/P right knee arthroscopy    [facility], [place] PA    Office Visit    3 months ago [facility]rosc[facility]    Office Visit    4 months ago Degenerative tear of medial meniscus of right knee    [facility]    Office Visit    5 months ago Pre-operative examination    [facility], [address], [place] Davina Angeles PA-C    Office Visit          Future Appointments         Provider Department Appt Notes    In 1 month LMB DEXA RM1; L[facility] JOAN RM1 [facility] [place]     In 4 months Андрей Carpio MD [facility] Weight loss                    Passed - Microalbumin procedure in past 12 months or taking ACE/ARB        Passed - EGFRCR or GFRNAA > 50     GFR Evaluation  EGFRCR: 111 , resulted on 11/20/2024          Passed - GFR in the past 12 months        Passed - Medication is active on med list             Future Appointments         Provider Department Appt Notes    In 1 month LMB DEXA RM1;  [facility] RM1 [facility] [place]     In 4 months Андрей Carpio MD [facility] Weight loss          Recent Outpatient Visits              1 month ago Type 2 diabetes mellitus without complication, without long-term current use of insulin ([facility])    [facility], [address], [place] Davina Angeles PA-C    Office Visit    2 months ago S/P right knee arthroscopy    [facility] [place] [place] [facility]    Office Visit    3 months ago S/P right knee arthroscopy    [facility], [place] Cabral PA    Office Visit    4 months ago Degenerative tear of medial meniscus of right knee    [facility], [place] Cabral PA    Office Visit    5 months ago Pre-operative examination    [facility], [address], [place] Davina Angeles PA-C    Office Visit

## 2025-01-21 DIAGNOSIS — S83.231D COMPLEX TEAR OF MEDIAL MENISCUS, CURRENT INJURY, RIGHT KNEE, SUBSEQUENT ENCOUNTER: ICD-10-CM

## 2025-01-22 RX ORDER — TRAMADOL HYDROCHLORIDE 50 MG/1
50 TABLET ORAL EVERY 6 HOURS PRN
Qty: 90 TABLET | Refills: 0 | Status: SHIPPED | OUTPATIENT
Start: 2025-01-22

## 2025-01-22 NOTE — TELEPHONE ENCOUNTER
Please review. Protocol Failed; No Protocol      Recent fills: 11/20//2024, 12/23/2024  Last Rx written: 12/23/2024  Last office visit: 11/20/2024      Requested Prescriptions   Pending Prescriptions Disp Refills    TRAMADOL 50 MG Oral Tab [Pharmacy Med Name: Tramadol Hydrochloride 50 Mg Tab Unic] 90 tablet 0     Sig: Take 1 tablet by mouth every 8 hours as needed for Pain. No alcohol or driving on this med. Stop if lethargic or hallucinating.       Controlled Substance Medication Failed - 1/22/2025  3:30 PM        Failed - This medication is a controlled substance - forward to provider to refill        Passed - Medication is active on med list               Future Appointments         Provider Department Appt Notes    In 1 month LMB DEXA RM1; LMB JOAN RM1 [facility] [place]     In 4 months Андрей Carpio MD [facility] [place] Weight loss          Recent Outpatient Visits              2 months ago Type 2 diabetes mellitus without complication, without long-term current use of insulin (HCC)    [facility], [address], [place] Davina Angeles PA-C    Office Visit    2 months ago [facility]    Office Visit    4 months ago S[facility]P right knee [facility]rosc[facility]    Office Visit    5 months ago Degenerative tear of medial meniscus of right knee    [facility]    Office Visit    6 months ago Pre-operative examination    [facility], [address], [place] Davina Angeles PA-C    Office Visit

## 2025-01-22 NOTE — TELEPHONE ENCOUNTER
Patient calling, she has only one tab left of tramadol - she is asking this for processed to urgently.   She has been told by aneudy to call the day before out or 2 days before but not too soon.    Patient also asking about injectable   2 meds were sent in and she is asking what one she is supposed to be using or filling.

## 2025-01-29 ENCOUNTER — TELEPHONE (OUTPATIENT)
Dept: SURGERY | Facility: CLINIC | Age: 45
End: 2025-01-29

## 2025-01-29 NOTE — TELEPHONE ENCOUNTER
Lvm for patient to call us back so we can reschedule her appt from 05/26 which is Memorial day..  gt

## 2025-01-30 ENCOUNTER — OFFICE VISIT (OUTPATIENT)
Dept: FAMILY MEDICINE CLINIC | Facility: CLINIC | Age: 45
End: 2025-01-30

## 2025-01-30 VITALS
HEIGHT: 66 IN | SYSTOLIC BLOOD PRESSURE: 166 MMHG | WEIGHT: 234 LBS | DIASTOLIC BLOOD PRESSURE: 93 MMHG | HEART RATE: 86 BPM | BODY MASS INDEX: 37.61 KG/M2

## 2025-01-30 DIAGNOSIS — R20.0 NUMBNESS AND TINGLING IN LEFT HAND: ICD-10-CM

## 2025-01-30 DIAGNOSIS — E11.9 TYPE 2 DIABETES MELLITUS WITHOUT COMPLICATION, WITHOUT LONG-TERM CURRENT USE OF INSULIN (HCC): Primary | ICD-10-CM

## 2025-01-30 DIAGNOSIS — R20.2 NUMBNESS AND TINGLING IN LEFT HAND: ICD-10-CM

## 2025-01-30 DIAGNOSIS — I10 ESSENTIAL HYPERTENSION: ICD-10-CM

## 2025-01-30 LAB — HEMOGLOBIN A1C: 8.5 % (ref 4.3–5.6)

## 2025-01-30 PROCEDURE — 83036 HEMOGLOBIN GLYCOSYLATED A1C: CPT | Performed by: PHYSICIAN ASSISTANT

## 2025-01-30 PROCEDURE — 99213 OFFICE O/P EST LOW 20 MIN: CPT | Performed by: PHYSICIAN ASSISTANT

## 2025-01-30 RX ORDER — GABAPENTIN 300 MG/1
300 CAPSULE ORAL NIGHTLY
Qty: 30 CAPSULE | Refills: 2 | Status: SHIPPED | OUTPATIENT
Start: 2025-01-30

## 2025-01-30 RX ORDER — BLOOD SUGAR DIAGNOSTIC
STRIP MISCELLANEOUS DAILY
COMMUNITY
Start: 2025-01-27

## 2025-01-30 RX ORDER — LANCETS
EACH MISCELLANEOUS DAILY
COMMUNITY
Start: 2025-01-27

## 2025-01-30 NOTE — ASSESSMENT & PLAN NOTE
Orders:    POC Glycohemoglobin [73445]    EKG 12 Lead; Future    semaglutide 4 MG/3ML Subcutaneous Solution Pen-injector; Inject 1 mg into the skin once a week.  Continue with Metformin 500 mg PO BID. Increase Ozempic 1 mg Qweek.

## 2025-01-30 NOTE — PROGRESS NOTES
HPI:     HPI  A 44-year-old woman presents with numbness in her left hand for the past 2 weeks.  The patient denies chest pain, SOB, N/V/C/D, hand pain, swelling, redness, fever, dizziness, syncope, and abdominal pain. There are no other concerns today.      Medications:     Current Outpatient Medications   Medication Sig Dispense Refill    gabapentin 300 MG Oral Cap Take 1 capsule (300 mg total) by mouth nightly. 30 capsule 2    semaglutide 4 MG/3ML Subcutaneous Solution Pen-injector Inject 1 mg into the skin once a week. 6 each 1    traMADol 50 MG Oral Tab Take 1 tablet (50 mg total) by mouth every 6 (six) hours as needed for Pain. 90 tablet 0    losartan 100 MG Oral Tab Take 1 tablet (100 mg total) by mouth daily. 90 tablet 3    metFORMIN  MG Oral Tablet 24 Hr Take 1 tablet (500 mg total) by mouth 2 (two) times daily with meals. 180 tablet 3    acetaminophen 500 MG Oral Tab Take 2 tablets (1,000 mg total) by mouth every 8 (eight) hours as needed for Pain. Maximum 4000 mg Tylenol/acetaminophen daily from all sources. 60 tablet 0    ibuprofen 200 MG Oral Tab Take 3 tablets (600 mg total) by mouth every 8 (eight) hours as needed. Take with food. Stop if stomach upset. 60 tablet 0    aspirin 81 MG Oral Tab EC Take 1 tablet (81 mg total) by mouth 2 (two) times daily with meals. Start dinner tonight then breakfast/dinner for 12 days. 24 tablet 0    Accu-Chek Softclix Lancets Does not apply Misc daily.      ACCU-CHEK GUIDE TEST In Vitro Strip daily.         Allergies:   Allergies[1]    History:     Health Maintenance   Topic Date Due    Diabetes Care Dilated Eye Exam  Never done    Colorectal Cancer Screening  Never done    Mammogram  10/21/2022    COVID-19 Vaccine (4 - 2024-25 season) 09/01/2024    Influenza Vaccine (1) 10/01/2024    Annual Depression Screening  Never done    Diabetes Care: Foot Exam (Annual)  01/01/2025    Tobacco Cessation Counseling  Never done    Diabetes Care: Microalb/Creat Ratio (Annual)   01/01/2025    Diabetes Care A1C  02/20/2025    HTN: BP Follow-Up  02/28/2025    Annual Physical  04/19/2025    Diabetes Care: GFR  11/20/2025    DTaP,Tdap,and Td Vaccines (3 - Td or Tdap) 11/03/2026    Pap Smear  04/19/2027    Pneumococcal Vaccine: Birth to 50yrs  Completed    Meningococcal B Vaccine  Aged Out       Patient's last menstrual period was 01/20/2025 (approximate).   Past Medical History:     Past Medical History:    Diabetes (HCC)    Essential hypertension    High blood pressure    High cholesterol    Osteoarthritis       Past Surgical History:     Past Surgical History:   Procedure Laterality Date    Arthroscopy of joint unlisted      Arthrotomy,open repair meniscus Right     2015    Hc epidural anesthesia      labor    Other surgical history      Abdominal Abscess surgery       Family History:     Family History   Problem Relation Age of Onset    Hypertension Father     Lipids Father     Diabetes Father     Hypertension Mother     Lipids Mother     Diabetes Mother     Ovarian Cancer Mother 49       Social History:     Social History     Socioeconomic History    Marital status: Single     Spouse name: Not on file    Number of children: Not on file    Years of education: Not on file    Highest education level: Not on file   Occupational History    Not on file   Tobacco Use    Smoking status: Some Days     Current packs/day: 0.15     Average packs/day: 0.2 packs/day for 24.0 years (3.6 ttl pk-yrs)     Types: Cigarettes     Passive exposure: Never    Smokeless tobacco: Never   Vaping Use    Vaping status: Never Used   Substance and Sexual Activity    Alcohol use: Not Currently     Comment: occassion    Drug use: Never    Sexual activity: Not on file   Other Topics Concern    Not on file   Social History Narrative    Not on file     Social Drivers of Health     Financial Resource Strain: Not on file   Food Insecurity: No Food Insecurity (2/24/2024)    [facility]    Hunger Vital Sign     Worried About Running Out of Food in the Last Year: Never true     Ran Out of Food in the Last Year: Never true   Transportation Needs: No Transportation Needs (2/24/2024)    [facility]    PRAPARE - Transportation     Lack of Transportation (Medical): No     Lack of Transportation (Non-Medical): No   Physical Activity: Not on file   Stress: Not on file   Social Connections: Not on file   Housing Stability: Unknown (2/24/2024)    [facility]    Housing Stability Vital Sign     Unable to Pay for Housing in the Last Year: No     Number of Places Lived in the Last Year: Not on file     Unstable Housing in the Last Year: No       Review of Systems:   Review of Systems   Skin:  Negative for rash.   Neurological:  Positive for numbness.        Vitals:    01/30/25 0959   BP: (!) 166/93   Pulse: 86   Weight: 234 lb (106.1 kg)   Height: 5' 6\" (1.676 m)     Body mass index is 37.77 kg/m².    Physical Exam:   Physical Exam  Vitals reviewed.   Cardiovascular:      Rate and Rhythm: Normal rate and regular rhythm.      Heart sounds: Normal heart sounds.   Pulmonary:      Effort: Pulmonary effort is normal.      Breath sounds: Normal breath sounds.      Bilateral hand: no swelling, no erythema, no tenderness to palpation. Full ROM.    Assessment and Plan::     Assessment & Plan  Type 2 diabetes mellitus without complication, without long-term current use of insulin ([facility])    Orders:    POC Glycohemoglobin [12027]    EKG 12 Lead; Future    semaglutide 4 MG/3ML Subcutaneous Solution Pen-injector; Inject 1 mg into the skin once a week.  Continue with Metformin 500 mg PO BID. Increase Ozempic 1 mg Qweek.    Numbness and tingling in left hand    Orders:    CBC With Differential With Platelet; Future    Comp Metabolic Panel (14); Future    Troponin I (High Sensitivity) [E];  Future    D-Dimer [E]; Future    gabapentin 300 MG Oral Cap; Take 1 capsule (300 mg total) by mouth nightly.    Essential hypertension  The patient does not take HTN medication at this time. Will take it when she gets home.          Discussed plan of care with pt and pt is in agreement.All questions answered. Pt to call with questions or concerns.         [1]   Allergies  Allergen Reactions    Cyclobenzaprine OTHER (SEE COMMENTS)     Pt stated it makes her \"shaky.\"    Raisins RASH

## 2025-02-01 ENCOUNTER — EKG ENCOUNTER (OUTPATIENT)
Dept: LAB | Age: 45
End: 2025-02-01
Attending: PHYSICIAN ASSISTANT
Payer: COMMERCIAL

## 2025-02-01 ENCOUNTER — LAB ENCOUNTER (OUTPATIENT)
Dept: LAB | Age: 45
End: 2025-02-01
Attending: PHYSICIAN ASSISTANT
Payer: COMMERCIAL

## 2025-02-01 DIAGNOSIS — R20.0 NUMBNESS AND TINGLING IN LEFT HAND: ICD-10-CM

## 2025-02-01 DIAGNOSIS — R20.2 NUMBNESS AND TINGLING IN LEFT HAND: ICD-10-CM

## 2025-02-01 DIAGNOSIS — E11.9 TYPE 2 DIABETES MELLITUS WITHOUT COMPLICATION, WITHOUT LONG-TERM CURRENT USE OF INSULIN (HCC): ICD-10-CM

## 2025-02-01 LAB
ALBUMIN SERPL-MCNC: 4.5 G/DL (ref 3.2–4.8)
ALBUMIN/GLOB SERPL: 1.3 {RATIO} (ref 1–2)
ALP LIVER SERPL-CCNC: 92 U/L
ALT SERPL-CCNC: 23 U/L
ANION GAP SERPL CALC-SCNC: 8 MMOL/L (ref 0–18)
AST SERPL-CCNC: 23 U/L (ref ?–34)
ATRIAL RATE: 78 BPM
BASOPHILS # BLD AUTO: 0.06 X10(3) UL (ref 0–0.2)
BASOPHILS NFR BLD AUTO: 0.6 %
BILIRUB SERPL-MCNC: 0.4 MG/DL (ref 0.3–1.2)
BUN BLD-MCNC: 14 MG/DL (ref 9–23)
BUN/CREAT SERPL: 19.2 (ref 10–20)
CALCIUM BLD-MCNC: 9 MG/DL (ref 8.7–10.4)
CHLORIDE SERPL-SCNC: 102 MMOL/L (ref 98–112)
CHOLEST SERPL-MCNC: 156 MG/DL (ref ?–200)
CO2 SERPL-SCNC: 31 MMOL/L (ref 21–32)
CREAT BLD-MCNC: 0.73 MG/DL
D DIMER PPP FEU-MCNC: <0.27 UG/ML FEU (ref ?–0.5)
DEPRECATED RDW RBC AUTO: 44.2 FL (ref 35.1–46.3)
EGFRCR SERPLBLD CKD-EPI 2021: 103 ML/MIN/1.73M2 (ref 60–?)
EOSINOPHIL # BLD AUTO: 0.21 X10(3) UL (ref 0–0.7)
EOSINOPHIL NFR BLD AUTO: 2.2 %
ERYTHROCYTE [DISTWIDTH] IN BLOOD BY AUTOMATED COUNT: 13.4 % (ref 11–15)
FASTING STATUS PATIENT QL REPORTED: NO
GLOBULIN PLAS-MCNC: 3.4 G/DL (ref 2–3.5)
GLUCOSE BLD-MCNC: 224 MG/DL (ref 70–99)
HCT VFR BLD AUTO: 38.4 %
HDLC SERPL-MCNC: 38 MG/DL (ref 40–59)
HGB BLD-MCNC: 13.9 G/DL
IMM GRANULOCYTES # BLD AUTO: 0.03 X10(3) UL (ref 0–1)
IMM GRANULOCYTES NFR BLD: 0.3 %
LDLC SERPL CALC-MCNC: 92 MG/DL (ref ?–100)
LYMPHOCYTES # BLD AUTO: 3.35 X10(3) UL (ref 1–4)
LYMPHOCYTES NFR BLD AUTO: 34.8 %
MCH RBC QN AUTO: 33.1 PG (ref 26–34)
MCHC RBC AUTO-ENTMCNC: 36.2 G/DL (ref 31–37)
MCV RBC AUTO: 91.4 FL
MONOCYTES # BLD AUTO: 0.59 X10(3) UL (ref 0.1–1)
MONOCYTES NFR BLD AUTO: 6.1 %
NEUTROPHILS # BLD AUTO: 5.38 X10 (3) UL (ref 1.5–7.7)
NEUTROPHILS # BLD AUTO: 5.38 X10(3) UL (ref 1.5–7.7)
NEUTROPHILS NFR BLD AUTO: 56 %
NONHDLC SERPL-MCNC: 118 MG/DL (ref ?–130)
OSMOLALITY SERPL CALC.SUM OF ELEC: 299 MOSM/KG (ref 275–295)
P AXIS: 46 DEGREES
P-R INTERVAL: 196 MS
PLATELET # BLD AUTO: 273 10(3)UL (ref 150–450)
POTASSIUM SERPL-SCNC: 3.7 MMOL/L (ref 3.5–5.1)
PROT SERPL-MCNC: 7.9 G/DL (ref 5.7–8.2)
Q-T INTERVAL: 390 MS
QRS DURATION: 82 MS
QTC CALCULATION (BEZET): 444 MS
R AXIS: -1 DEGREES
RBC # BLD AUTO: 4.2 X10(6)UL
SODIUM SERPL-SCNC: 141 MMOL/L (ref 136–145)
T AXIS: 132 DEGREES
TRIGL SERPL-MCNC: 148 MG/DL (ref 30–149)
TROPONIN I SERPL HS-MCNC: 50 NG/L
VENTRICULAR RATE: 78 BPM
VLDLC SERPL CALC-MCNC: 24 MG/DL (ref 0–30)
WBC # BLD AUTO: 9.6 X10(3) UL (ref 4–11)

## 2025-02-01 PROCEDURE — 36415 COLL VENOUS BLD VENIPUNCTURE: CPT

## 2025-02-01 PROCEDURE — 80053 COMPREHEN METABOLIC PANEL: CPT

## 2025-02-01 PROCEDURE — 85379 FIBRIN DEGRADATION QUANT: CPT

## 2025-02-01 PROCEDURE — 85025 COMPLETE CBC W/AUTO DIFF WBC: CPT

## 2025-02-01 PROCEDURE — 93005 ELECTROCARDIOGRAM TRACING: CPT

## 2025-02-01 PROCEDURE — 93010 ELECTROCARDIOGRAM REPORT: CPT | Performed by: INTERNAL MEDICINE

## 2025-02-01 PROCEDURE — 80061 LIPID PANEL: CPT

## 2025-02-01 PROCEDURE — 84484 ASSAY OF TROPONIN QUANT: CPT

## 2025-02-04 ENCOUNTER — TELEPHONE (OUTPATIENT)
Dept: FAMILY MEDICINE CLINIC | Facility: CLINIC | Age: 45
End: 2025-02-04

## 2025-02-04 NOTE — TELEPHONE ENCOUNTER
Per provider Bill, send return to work letter for today.   Also advise patient to schedule hospital follow up.

## 2025-02-20 ENCOUNTER — OFFICE VISIT (OUTPATIENT)
Dept: FAMILY MEDICINE CLINIC | Facility: CLINIC | Age: 45
End: 2025-02-20
Payer: COMMERCIAL

## 2025-02-20 VITALS
HEIGHT: 66 IN | SYSTOLIC BLOOD PRESSURE: 174 MMHG | WEIGHT: 224 LBS | BODY MASS INDEX: 36 KG/M2 | HEART RATE: 87 BPM | DIASTOLIC BLOOD PRESSURE: 94 MMHG

## 2025-02-20 DIAGNOSIS — G56.02 CARPAL TUNNEL SYNDROME OF LEFT WRIST: ICD-10-CM

## 2025-02-20 DIAGNOSIS — S83.231D COMPLEX TEAR OF MEDIAL MENISCUS, CURRENT INJURY, RIGHT KNEE, SUBSEQUENT ENCOUNTER: ICD-10-CM

## 2025-02-20 DIAGNOSIS — Z12.31 ENCOUNTER FOR SCREENING MAMMOGRAM FOR BREAST CANCER: ICD-10-CM

## 2025-02-20 DIAGNOSIS — I10 ESSENTIAL HYPERTENSION: ICD-10-CM

## 2025-02-20 DIAGNOSIS — E11.9 TYPE 2 DIABETES MELLITUS WITHOUT COMPLICATION, WITHOUT LONG-TERM CURRENT USE OF INSULIN (HCC): Primary | ICD-10-CM

## 2025-02-20 LAB
APPEARANCE: CLEAR
BILIRUBIN: NEGATIVE
CREAT UR-SCNC: 81.4 MG/DL
GLUCOSE (URINE DIPSTICK): NEGATIVE MG/DL
HEMOGLOBIN A1C: 7.4 % (ref 4.3–5.6)
LEUKOCYTES: NEGATIVE
MICROALBUMIN UR-MCNC: 1.6 MG/DL
MICROALBUMIN/CREAT 24H UR-RTO: 19.7 UG/MG (ref ?–30)
MULTISTIX LOT#: NORMAL NUMERIC
NITRITE, URINE: NEGATIVE
OCCULT BLOOD: NEGATIVE
PH, URINE: 6 (ref 4.5–8)
PROTEIN (URINE DIPSTICK): NEGATIVE MG/DL
SPECIFIC GRAVITY: 1.02 (ref 1–1.03)
URINE-COLOR: YELLOW
UROBILINOGEN,SEMI-QN: 1 MG/DL (ref 0–1.9)

## 2025-02-20 PROCEDURE — 99213 OFFICE O/P EST LOW 20 MIN: CPT | Performed by: PHYSICIAN ASSISTANT

## 2025-02-20 PROCEDURE — 81002 URINALYSIS NONAUTO W/O SCOPE: CPT | Performed by: PHYSICIAN ASSISTANT

## 2025-02-20 PROCEDURE — 83036 HEMOGLOBIN GLYCOSYLATED A1C: CPT | Performed by: PHYSICIAN ASSISTANT

## 2025-02-20 RX ORDER — TRAMADOL HYDROCHLORIDE 50 MG/1
50 TABLET ORAL EVERY 6 HOURS PRN
Qty: 60 TABLET | Refills: 0 | Status: SHIPPED | OUTPATIENT
Start: 2025-02-20

## 2025-02-20 RX ORDER — GABAPENTIN 100 MG/1
100 CAPSULE ORAL NIGHTLY
Qty: 90 CAPSULE | Refills: 0 | Status: SHIPPED | OUTPATIENT
Start: 2025-02-20

## 2025-02-20 RX ORDER — LOSARTAN POTASSIUM 100 MG/1
100 TABLET ORAL DAILY
Qty: 90 TABLET | Refills: 3 | Status: SHIPPED | OUTPATIENT
Start: 2025-02-20

## 2025-02-20 RX ORDER — TRAMADOL HYDROCHLORIDE 50 MG/1
50 TABLET ORAL EVERY 6 HOURS PRN
Qty: 90 TABLET | Refills: 0 | Status: CANCELLED | OUTPATIENT
Start: 2025-02-20

## 2025-02-20 NOTE — PROGRESS NOTES
HPI:     HPI  A 44-year-old woman is in the office for a follow-up. She is feeling well. She saw a Cardiologist last week and is scheduled for a CT coronary angio in April . She still feels tingling in her left fingers.  The patient denies chest pain, SOB, N/V/C/D, fever, dizziness, syncope, and abdominal pain. There are no other concerns today.    Medications:     Current Outpatient Medications   Medication Sig Dispense Refill    semaglutide 4 MG/3ML Subcutaneous Solution Pen-injector Inject 1 mg into the skin once a week. 6 mL 3    gabapentin 100 MG Oral Cap Take 1 capsule (100 mg total) by mouth nightly. 90 capsule 0    losartan 100 MG Oral Tab Take 1 tablet (100 mg total) by mouth daily. 90 tablet 3    traMADol 50 MG Oral Tab Take 1 tablet (50 mg total) by mouth every 6 (six) hours as needed for Pain. 60 tablet 0    Accu-Chek Softclix Lancets Does not apply Misc daily.      ACCU-CHEK GUIDE TEST In Vitro Strip daily.         Allergies:   Allergies[1]    History:     Health Maintenance   Topic Date Due    Diabetes Care Dilated Eye Exam  Never done    Colorectal Cancer Screening  Never done    Mammogram  10/21/2022    COVID-19 Vaccine (4 - 2024-25 season) 09/01/2024    Influenza Vaccine (1) 10/01/2024    Annual Depression Screening  Never done    Diabetes Care: Foot Exam (Annual)  01/01/2025    Tobacco Cessation Counseling  Never done    Diabetes Care: Microalb/Creat Ratio (Annual)  01/01/2025    HTN: BP Follow-Up  02/28/2025    Annual Physical  04/19/2025    Diabetes Care A1C  04/30/2025    Diabetes Care: GFR  02/01/2026    DTaP,Tdap,and Td Vaccines (3 - Td or Tdap) 11/03/2026    Pap Smear  04/19/2027    Pneumococcal Vaccine: Birth to 50yrs  Completed    Meningococcal B Vaccine  Aged Out       Patient's last menstrual period was 01/20/2025 (approximate).   Past Medical History:     Past Medical History:    Diabetes (HCC)    Essential hypertension    High blood pressure    High cholesterol    Osteoarthritis        Past Surgical History:     Past Surgical History:   Procedure Laterality Date    Arthroscopy of joint unlisted      Arthrotomy,open repair meniscus Right     2015    Hc epidural anesthesia      labor    Other surgical history      Abdominal Abscess surgery       Family History:     Family History   Problem Relation Age of Onset    Hypertension Father     Lipids Father     Diabetes Father     Hypertension Mother     Lipids Mother     Diabetes Mother     Ovarian Cancer Mother 49       Social History:     Social History     Socioeconomic History    Marital status: Single     Spouse name: Not on file    Number of children: Not on file    Years of education: Not on file    Highest education level: Not on file   Occupational History    Not on file   Tobacco Use    Smoking status: Some Days     Current packs/day: 0.15     Average packs/day: 0.2 packs/day for 24.0 years (3.6 ttl pk-yrs)     Types: Cigarettes     Passive exposure: Never    Smokeless tobacco: Never   Vaping Use    Vaping status: Never Used   Substance and Sexual Activity    Alcohol use: Not Currently     Comment: occassion    Drug use: Never    Sexual activity: Not on file   Other Topics Concern    Not on file   Social History Narrative    Not on file     Social Drivers of Health     Food Insecurity: No Food Insecurity (2/2/2025)    Received from [facility]    Hunger Vital Sign     Worried About Running Out of Food in the Last Year: Never true     Ran Out of Food in the Last Year: Never true   Transportation Needs: No Transportation Needs (2/2/2025)    Received from [facility]    PRAPARE - Transportation     Lack of Transportation (Medical): No     Lack of Transportation (Non-Medical): No   Stress: Not on file   Housing Stability: Unknown (2/2/2025)    Received from [facility]    Housing Stability Vital Sign     Unable to Pay for Housing in the Last Year: No     Number of Times Moved in the Last  Year: Not on file     Homeless in the Last Year: No       Review of Systems:   Review of Systems   Respiratory:  Negative for shortness of breath and wheezing.    Cardiovascular:  Negative for chest pain.        Vitals:    02/20/25 0946 02/20/25 1018   BP: (!) 179/97 (!) 174/94   Pulse: 87    Weight: 224 lb (101.6 kg)    Height: 5' 6\" (1.676 m)      Body mass index is 36.15 kg/m².    Physical Exam:   Physical Exam  Vitals reviewed.      Patient alert and orient x3, able to carry on conversation easily, without shortness of breath, coughing, can speak in full sentences.      Assessment and Plan::     Assessment & Plan  Type 2 diabetes mellitus without complication, without long-term current use of insulin ([facility])    Orders:    POC Glycohemoglobin [99834]    POC Urinalysis, Manual Dip without microscopy [65641]    Microalb/Creat Ratio, Random Urine; Future    semaglutide 4 MG/3ML Subcutaneous Solution Pen-injector; Inject 1 mg into the skin once a week.  Diabetes: A1c is 7.4 done 2/20/2025 which shows hyperglycemia and borderline control, maintain vigilance and increase activity and medications as listed.   DM Meds: semaglutide Sopn - 4 MG/3ML      Diabetic Complications: Hyperglycemia: A1c 7.4% 2/20/2025, .         Encounter for screening mammogram for breast cancer    Orders:    [facility] GREY 2D+3D SCREENING BILAT (CPT=77067/16518); Future    Carpal tunnel syndrome of left wrist    Orders:    gabapentin 100 MG Oral Cap; Take 1 capsule (100 mg total) by mouth nightly.    Plastic Surgery Referral - In Network    Complex tear of medial meniscus, current injury, right knee, subsequent encounter    Orders:    traMADol 50 MG Oral Tab; Take 1 tablet (50 mg total) by mouth every 6 (six) hours as needed for Pain.    Essential hypertension    Orders:    losartan 100 MG Oral Tab; Take 1 tablet (100 mg total) by mouth daily.       Discussed plan of care with pt and pt is in agreement.All questions answered. Pt to call with  questions or concerns.         [1]   Allergies  Allergen Reactions    Cyclobenzaprine OTHER (SEE COMMENTS)     Pt stated it makes her \"shaky.\"    Raisins RASH

## 2025-02-20 NOTE — ASSESSMENT & PLAN NOTE
Orders:    POC Glycohemoglobin [99819]    POC Urinalysis, Manual Dip without microscopy [76702]    Microalb/Creat Ratio, Random Urine; Future    semaglutide 4 MG/3ML Subcutaneous Solution Pen-injector; Inject 1 mg into the skin once a week.  Diabetes: A1c is 7.4 done 2/20/2025 which shows hyperglycemia and borderline control, maintain vigilance and increase activity and medications as listed.   DM Meds: semaglutide Sopn - 4 MG/3ML      Diabetic Complications: Hyperglycemia: A1c 7.4% 2/20/2025, .

## 2025-02-20 NOTE — TELEPHONE ENCOUNTER
Bill Angeles, please advise    Patient (name and  verified) is calling regarding the Tramadol Rx. Patient was just in the office and now is at the pharmacy. Patient was informed that the Tramadol Rx was not sent. Please advise    Order pended for approval/review.

## 2025-02-27 ENCOUNTER — TELEPHONE (OUTPATIENT)
Dept: FAMILY MEDICINE CLINIC | Facility: CLINIC | Age: 45
End: 2025-02-27

## 2025-03-06 ENCOUNTER — PATIENT MESSAGE (OUTPATIENT)
Dept: FAMILY MEDICINE CLINIC | Facility: CLINIC | Age: 45
End: 2025-03-06

## 2025-03-06 ENCOUNTER — TELEPHONE (OUTPATIENT)
Dept: FAMILY MEDICINE CLINIC | Facility: CLINIC | Age: 45
End: 2025-03-06

## 2025-03-06 DIAGNOSIS — S83.231D COMPLEX TEAR OF MEDIAL MENISCUS, CURRENT INJURY, RIGHT KNEE, SUBSEQUENT ENCOUNTER: ICD-10-CM

## 2025-03-07 ENCOUNTER — NURSE TRIAGE (OUTPATIENT)
Dept: FAMILY MEDICINE CLINIC | Facility: CLINIC | Age: 45
End: 2025-03-07

## 2025-03-07 DIAGNOSIS — R82.90 ABNORMAL URINE ODOR: Primary | ICD-10-CM

## 2025-03-07 RX ORDER — TRAMADOL HYDROCHLORIDE 50 MG/1
50 TABLET ORAL EVERY 6 HOURS PRN
Qty: 30 TABLET | Refills: 0 | Status: SHIPPED | OUTPATIENT
Start: 2025-03-07

## 2025-03-07 NOTE — TELEPHONE ENCOUNTER
Patient called and requesting to expedite her tramadol refill request . She only has 2 pills left .    SENT AS HIGH PRIORITY

## 2025-03-07 NOTE — TELEPHONE ENCOUNTER
PETRONA Ireland=Would you like to order UA with Keflex?  RN pended lab Thank you.      Action Requested: Summary for Provider     []  Critical Lab, Recommendations Needed  [x] Need Additional Advice  []   FYI    []   Need Orders  [] Need Medications Sent to Pharmacy  []  Other     SUMMARY: Urinary frequency, dysuria, cloudy urine, strong odor, mild itchiness, not pregnant . She is ONLY requesting for the antibiotic, inform that urinalysis is needed to confirm  before prescribing anything .  RN offered an  appointment but declines, instead she will schedule via [employer] .  Advised urgent care or immediate care  for worsening symptoms .      Reason for call: Urinary Symptoms  Onset: yesterday       Reason for Disposition   SEVERE pain with urination    Protocols used: Urination Pain - Female-A-[place]

## 2025-03-07 NOTE — TELEPHONE ENCOUNTER
Please review.  Protocol Failed.    Patient states almost out of medication     Tramadol Recent fills : 11/20 #90, 12/23 #90 for 30days, 1/22 daily dose increased #90 for 22 days, 2/20 #60  Last prescription written: 2/20/25  Last office visit:  2/20/2025    Requested Prescriptions   Pending Prescriptions Disp Refills    traMADol 50 MG Oral Tab 60 tablet 0     Sig: Take 1 tablet (50 mg total) by mouth every 6 (six) hours as needed for Pain.       Controlled Substance Medication Failed - 3/7/2025 10:15 AM        Failed - This medication is a controlled substance - forward to provider to refill        Passed - Medication is active on med list

## 2025-03-07 NOTE — TELEPHONE ENCOUNTER
UA ordered. Please advise the patient to submit the urine sample first. Proceed to the ER/UC if worsen.

## 2025-03-07 NOTE — TELEPHONE ENCOUNTER
Left message to call back but also stated response from PETRONA Angeles sent to patient via [employer] for review.  To call office if any further questions/concerns.

## 2025-03-18 ENCOUNTER — OFFICE VISIT (OUTPATIENT)
Dept: FAMILY MEDICINE CLINIC | Facility: CLINIC | Age: 45
End: 2025-03-18
Payer: COMMERCIAL

## 2025-03-18 VITALS
BODY MASS INDEX: 35.36 KG/M2 | SYSTOLIC BLOOD PRESSURE: 175 MMHG | HEART RATE: 93 BPM | WEIGHT: 220 LBS | HEIGHT: 66 IN | DIASTOLIC BLOOD PRESSURE: 98 MMHG

## 2025-03-18 DIAGNOSIS — S83.231D COMPLEX TEAR OF MEDIAL MENISCUS, CURRENT INJURY, RIGHT KNEE, SUBSEQUENT ENCOUNTER: Primary | ICD-10-CM

## 2025-03-18 DIAGNOSIS — R20.2 NUMBNESS AND TINGLING IN LEFT HAND: ICD-10-CM

## 2025-03-18 DIAGNOSIS — R20.0 NUMBNESS AND TINGLING IN LEFT HAND: ICD-10-CM

## 2025-03-18 DIAGNOSIS — I10 ESSENTIAL HYPERTENSION: ICD-10-CM

## 2025-03-18 PROCEDURE — 99213 OFFICE O/P EST LOW 20 MIN: CPT | Performed by: PHYSICIAN ASSISTANT

## 2025-03-18 RX ORDER — ATORVASTATIN CALCIUM 40 MG/1
40 TABLET, FILM COATED ORAL DAILY
COMMUNITY

## 2025-03-18 RX ORDER — AMLODIPINE BESYLATE 10 MG/1
10 TABLET ORAL DAILY
Qty: 90 TABLET | Refills: 3 | Status: SHIPPED | OUTPATIENT
Start: 2025-03-18

## 2025-03-18 NOTE — PROGRESS NOTES
HPI:     HPI  A 44-year-old woman is in the office for a follow-up. The numbness in her left hand has worsened. Her right knee pain continues. She takes Tramadol 50 mg orally three times a day as needed.    Medications:     Current Outpatient Medications   Medication Sig Dispense Refill    atorvastatin 40 MG Oral Tab Take 1 tablet (40 mg total) by mouth daily.      amLODIPine 10 MG Oral Tab Take 1 tablet (10 mg total) by mouth daily. 90 tablet 3    semaglutide 4 MG/3ML Subcutaneous Solution Pen-injector Inject 1 mg into the skin once a week. 6 mL 3    gabapentin 100 MG Oral Cap Take 1 capsule (100 mg total) by mouth nightly. 90 capsule 0    losartan 100 MG Oral Tab Take 1 tablet (100 mg total) by mouth daily. 90 tablet 3    Accu-Chek Softclix Lancets Does not apply Misc daily.      ACCU-CHEK GUIDE TEST In Vitro Strip daily.      traMADol 50 MG Oral Tab Take 1 tablet (50 mg total) by mouth 2 (two) times daily as needed for Pain. 60 tablet 0       Allergies:   Allergies[1]    History:     Health Maintenance   Topic Date Due    Diabetes Care Dilated Eye Exam  Never done    Colorectal Cancer Screening  Never done    Mammogram  10/21/2022    COVID-19 Vaccine (4 - 2024-25 season) 09/01/2024    Influenza Vaccine (1) 10/01/2024    Annual Depression Screening  Never done    Diabetes Care: Foot Exam (Annual)  01/01/2025    Tobacco Cessation Counseling  Never done    HTN: BP Follow-Up  03/20/2025    Annual Physical  04/19/2025    Diabetes Care A1C  08/20/2025    Diabetes Care: GFR  02/01/2026    DTaP,Tdap,and Td Vaccines (3 - Td or Tdap) 11/03/2026    Pap Smear  04/19/2027    Diabetes Care: Microalb/Creat Ratio (Annual)  Completed    Pneumococcal Vaccine: Birth to 50yrs  Completed    Meningococcal B Vaccine  Aged Out       Patient's last menstrual period was 01/20/2025 (approximate).   Past Medical History:     Past Medical History:    Diabetes (HCC)    Essential hypertension    High blood pressure    High cholesterol     Osteoarthritis       Past Surgical History:     Past Surgical History:   Procedure Laterality Date    Arthroscopy of joint unlisted      Arthrotomy,open repair meniscus Right     2015    Hc epidural anesthesia      labor    Other surgical history      Abdominal Abscess surgery       Family History:     Family History   Problem Relation Age of Onset    Hypertension Father     Lipids Father     Diabetes Father     Hypertension Mother     Lipids Mother     Diabetes Mother     Ovarian Cancer Mother 49       Social History:     Social History     Socioeconomic History    Marital status: Single     Spouse name: Not on file    Number of children: Not on file    Years of education: Not on file    Highest education level: Not on file   Occupational History    Not on file   Tobacco Use    Smoking status: Some Days     Current packs/day: 0.15     Average packs/day: 0.2 packs/day for 24.0 years (3.6 ttl pk-yrs)     Types: Cigarettes     Passive exposure: Never    Smokeless tobacco: Never   Vaping Use    Vaping status: Never Used   Substance and Sexual Activity    Alcohol use: Not Currently     Comment: occassion    Drug use: Never    Sexual activity: Not on file   Other Topics Concern    Not on file   Social History Narrative    Not on file     Social Drivers of Health     Food Insecurity: No Food Insecurity (2/2/2025)    Received from [facility]    Hunger Vital Sign     Worried About Running Out of Food in the Last Year: Never true     Ran Out of Food in the Last Year: Never true   Transportation Needs: No Transportation Needs (2/2/2025)    Received from [facility]    PRAPARE - Transportation     Lack of Transportation (Medical): No     Lack of Transportation (Non-Medical): No   Stress: Not on file   Housing Stability: Unknown (2/2/2025)    Received from [facility]    Housing Stability Vital Sign     Unable to Pay for Housing in the Last Year: No     Number of Times Moved  in the Last Year: Not on file     Homeless in the Last Year: No       Review of Systems:   Review of Systems   Neurological:  Positive for numbness.        Vitals:    03/18/25 1723 03/18/25 1739   BP: (!) 178/93 (!) 175/98   Pulse: 93    Weight: 220 lb (99.8 kg)    Height: 5' 6\" (1.676 m)      Body mass index is 35.51 kg/m².    Physical Exam:   Physical Exam  Vitals reviewed.      Patient alert and orient x3, able to carry on conversation easily, without shortness of breath, coughing, can speak in full sentences.      Assessment and Plan::     Assessment & Plan  Complex tear of medial meniscus, current injury, right knee, subsequent encounter    Orders:    Ortho Referral - In Network    Numbness and tingling in left hand  Advise the patient to follow up with Hand Specialist.       Essential hypertension    Orders:    amLODIPine 10 MG Oral Tab; Take 1 tablet (10 mg total) by mouth daily.  Continue with Losartan 100 mg PO every day. Start Amlodipine 10 mg PO every day.     Discussed plan of care with pt and pt is in agreement.All questions answered. Pt to call with questions or concerns.         [1]   Allergies  Allergen Reactions    Cyclobenzaprine OTHER (SEE COMMENTS)     Pt stated it makes her \"shaky.\"    Raisins RASH

## 2025-03-19 ENCOUNTER — PATIENT MESSAGE (OUTPATIENT)
Dept: FAMILY MEDICINE CLINIC | Facility: CLINIC | Age: 45
End: 2025-03-19

## 2025-03-19 DIAGNOSIS — S83.231D COMPLEX TEAR OF MEDIAL MENISCUS, CURRENT INJURY, RIGHT KNEE, SUBSEQUENT ENCOUNTER: ICD-10-CM

## 2025-03-19 RX ORDER — TRAMADOL HYDROCHLORIDE 50 MG/1
50 TABLET ORAL 2 TIMES DAILY PRN
Qty: 60 TABLET | Refills: 0 | Status: SHIPPED | OUTPATIENT
Start: 2025-03-19

## 2025-03-19 NOTE — TELEPHONE ENCOUNTER
Verified name and .    Patient calling to follow up on refill request- states she is almost out of medication.

## 2025-03-19 NOTE — TELEPHONE ENCOUNTER
Please kindly review this medication  Medication request is marked high priority: patient states she is almost of of medication.    [x] FAILS PROTOCOL            [x] Controlled Substance             [] Medication not previously prescribed by Provider            [] Due for appointment- no future appointment scheduled            [] BP reading            [] Labs            [] Depression Screening            [] Asthma (ACT recorded/ACT score)    [] HAS NO PROTOCOL ATTACHED     Recent fill dates: 3/7/25, 2/20/25, and 1/22/25  Date of  last written prescription: 3/7/25   Last written quantity: #30 each and processed as a 7 day supply  LAST OFFICE VISIT: 3/18/2025  [x] Takes as needed  [] Takes scheduled daily

## 2025-03-23 NOTE — ASSESSMENT & PLAN NOTE
Orders:    amLODIPine 10 MG Oral Tab; Take 1 tablet (10 mg total) by mouth daily.  Continue with Losartan 100 mg PO every day. Start Amlodipine 10 mg PO every day.

## 2025-03-25 ENCOUNTER — HOSPITAL ENCOUNTER (OUTPATIENT)
Dept: MAMMOGRAPHY | Age: 45
Discharge: HOME | End: 2025-03-25
Attending: PHYSICIAN ASSISTANT
Payer: COMMERCIAL

## 2025-03-25 DIAGNOSIS — Z12.31 ENCOUNTER FOR SCREENING MAMMOGRAM FOR BREAST CANCER: ICD-10-CM

## 2025-03-25 PROCEDURE — 77067 SCR MAMMO BI INCL CAD: CPT | Performed by: PHYSICIAN ASSISTANT

## 2025-03-25 PROCEDURE — 77063 BREAST TOMOSYNTHESIS BI: CPT | Performed by: PHYSICIAN ASSISTANT

## 2025-04-02 ENCOUNTER — TELEPHONE (OUTPATIENT)
Dept: ORTHOPEDICS CLINIC | Facility: CLINIC | Age: 45
End: 2025-04-02

## 2025-04-02 NOTE — TELEPHONE ENCOUNTER
Patient is scheduled for right knee, previous patient of Dr. Duffy. Imaging in epic. Please advise if additional imaging is needed.  Future Appointments   Date Time Provider Department Center   4/28/2025  1:00 PM Aixa Myers MD [facility]   4/29/2025 11:30 AM Dimitri Michel MD [facility]

## 2025-04-03 ENCOUNTER — TELEPHONE (OUTPATIENT)
Facility: CLINIC | Age: 45
End: 2025-04-03

## 2025-04-03 NOTE — TELEPHONE ENCOUNTER
New pt appt for right knee, imaging avail in [facility]   Future Appointments   Date Time Provider Department Center   4/29/2025 11:30 AM Dimitri Michel MD [facility]   5/12/2025 10:40 AM Aixa Myers MD [facility]

## 2025-04-17 ENCOUNTER — PATIENT MESSAGE (OUTPATIENT)
Dept: FAMILY MEDICINE CLINIC | Facility: CLINIC | Age: 45
End: 2025-04-17

## 2025-04-17 DIAGNOSIS — S83.231D COMPLEX TEAR OF MEDIAL MENISCUS, CURRENT INJURY, RIGHT KNEE, SUBSEQUENT ENCOUNTER: ICD-10-CM

## 2025-04-18 RX ORDER — TRAMADOL HYDROCHLORIDE 50 MG/1
50 TABLET ORAL 2 TIMES DAILY PRN
Qty: 60 TABLET | Refills: 0 | Status: SHIPPED | OUTPATIENT
Start: 2025-04-18

## 2025-04-29 ENCOUNTER — OFFICE VISIT (OUTPATIENT)
Dept: PAIN CLINIC | Facility: HOSPITAL | Age: 45
End: 2025-04-29
Attending: ANESTHESIOLOGY
Payer: COMMERCIAL

## 2025-04-29 VITALS
HEART RATE: 86 BPM | OXYGEN SATURATION: 94 % | DIASTOLIC BLOOD PRESSURE: 84 MMHG | BODY MASS INDEX: 35 KG/M2 | WEIGHT: 218 LBS | SYSTOLIC BLOOD PRESSURE: 145 MMHG

## 2025-04-29 DIAGNOSIS — Z98.890 STATUS POST ARTHROSCOPY OF RIGHT KNEE: ICD-10-CM

## 2025-04-29 DIAGNOSIS — G89.29 CHRONIC PAIN OF RIGHT KNEE: Primary | ICD-10-CM

## 2025-04-29 DIAGNOSIS — M25.561 CHRONIC PAIN OF RIGHT KNEE: Primary | ICD-10-CM

## 2025-04-29 PROCEDURE — 99203 OFFICE O/P NEW LOW 30 MIN: CPT | Performed by: ANESTHESIOLOGY

## 2025-04-29 NOTE — PROGRESS NOTES
Patient presents in office today with reported pain in R knee     Current pain level reported = 7/10    Last reported dose of Tramadol this morning at 4am       Narcotic Contract renewal New Patient     04.29.25-NP R Meniscus tear-BridgetteMarilu    09.06.24- R Meniscus repair     Pain increases with walking,bending, while at work, activity and ADL's.

## 2025-04-29 NOTE — PATIENT INSTRUCTIONS
Refill policies:    Allow 2-3 business days for refills; controlled substances may take longer.  Contact your pharmacy at least 5 days prior to running out of medication and have them send an electronic request or submit request through the “request refill” option in your [employer] account.  Refills are not addressed on weekends; covering physicians do not authorize routine medications on weekends.  No narcotics or controlled substances are refilled after noon on Fridays or by on call physicians.  By law, narcotics must be electronically prescribed.  A 30 day supply with no refills is the maximum allowed.  If your prescription is due for a refill, you may be due for a follow up appointment.  To best provide you care, patients receiving routine medications need to be seen at least once a year.  Patients receiving narcotic/controlled substance medications need to be seen at least once every 3 months.  In the event that your preferred pharmacy does not have the requested medication in stock (e.g. Backordered), it is your responsibility to find another pharmacy that has the requested medication available.  We will gladly send a new prescription to that pharmacy at your request.    Scheduling Tests:    If your physician has ordered radiology tests such as MRI or CT scans, please contact Central Scheduling at [phone] right away to schedule the test.  Once scheduled, the [facility] Centralized Referral Team will work with your insurance carrier to obtain pre-certification or prior authorization.  Depending on your insurance carrier, approval may take 3-10 days.  It is highly recommended patients assure they have received an authorization before having a test performed.  If test is done without insurance authorization, patient may be responsible for the entire amount billed.      Precertification and Prior Authorizations:  If your physician has recommended that you have a procedure or additional testing performed the [facility]  Centralized Referral Team will contact your insurance carrier to obtain pre-certification or prior authorization.    You are strongly encouraged to contact your insurance carrier to verify that your procedure/test has been approved and is a COVERED benefit.  Although the [facility] Centralized Referral Team does its due diligence, the insurance carrier gives the disclaimer that \"Although the procedure is authorized, this does not guarantee payment.\"    Ultimately the patient is responsible for payment.   Thank you for your understanding in this matter.  Paperwork Completion:  If you require FMLA or disability paperwork for your recovery, please make sure to either drop it off or have it faxed to our office at [phone]. Be sure the form has your name and date of birth on it.  The form will be faxed to our Forms Department and they will complete it for you.  There is a 25$ fee for all forms that need to be filled out.  Please be aware there is a 10-14 day turnaround time.  You will need to sign a release of information (AVIS) form if your paperwork does not come with one.  You may call the Forms Department with any questions at [phone].  Their fax number is [phone].

## 2025-04-29 NOTE — CHRONIC PAIN
[facility]  Report of Consultation    Tasia Dawson Patient Status:  Outpatient    1980 MRN [id number]   Location [facility] Attending Dimitri Michel MD     PCP Haroldo Escalante,        Date of Consult:  2025    Reason for Consultation:   1. Chronic pain of right knee    2. Status post arthroscopy of right knee          History of Present Illness:  Tasia Dawson is a a(n) 44 year old female who presents with chronic right knee pain status post arthroscopy 2024 due to medial meniscal tear on the right.  Unfortunately after surgery and subsequent physical therapy the patient reports no improvement of her right knee pain.  She states that her orthopedic surgeon has relocated and now has been referred to an additional orthopedic surgeon Dr. Myers and his appointment is on 2025 in the meantime she has been maintained with tramadol by her primary care physician and APN.  She states she is here for further recommendations.  Patient reports she has not been taking any nonsteroidal anti-inflammatories.  She states due to her job and prolonged periods of standing by the end of the day she has severe pain in her right knee which then inhibits her ADLs.    History:  Past Medical History[1]  Past Surgical History[2]  Family History[3]   reports that she has been smoking cigarettes. She has a 3.6 pack-year smoking history. She has never been exposed to tobacco smoke. She has never used smokeless tobacco. She reports that she does not currently use alcohol. She reports that she does not use drugs.    Allergies:  Allergies[4]    Medications:  Current Hospital Medications[5]  Scheduled Meds:Scheduled Medications[6]  Continuous Infusions:Medication Infusions[7]  PRN Meds:.PRN Medications[8]    Review of Systems:  Pertinent items are noted in HPI.    Physical Exam:  Blood pressure 145/84, pulse 86, weight 218 lb (98.9 kg), last menstrual  period 03/24/2025, SpO2 94%.  General: Alert and oriented x3, NAD, appears stated age, appropriate disposition and demeanor, answers questions appropriately appears to be comfortable, obesity  Lower extremities: Grossly intact sensorimotor exam bilaterally equal tenderness over the right knee patella and patella tendon.  Decreased range of motion of the right knee due to the pain.      Laboratory Data:       Imaging:  Narrative   PROCEDURE: MRI KNEE, RIGHT (UFP=[id number])     COMPARISON: [facility], MRI KNEE, RIGHT (PMZ=[id number]), 3/12/2024, 1:13 PM.     INDICATIONS: G89.29 Chronic pain of right knee M25.561 Chronic pain of right knee M23.203 Degenerative tear of medial meniscus of right knee Z98.890 S/P right knee arthrosc*     TECHNIQUE: A complete multi-planar MRI was performed.       FINDINGS:    MEDIAL MENISCUS: Diminutive appearance of the anterior horn and body consistent with prior meniscectomy.  Posterior horn and posterior root otherwise intact.     LATERAL MENISCUS: Intact     ACL: Intact  PCL: Intact  MCL: Mild thickening with increased intrasubstance signal within the MCL with mild periligamentous edema. There is no full-thickness tear.  LCL COMPLEX: Intact     PATELLOFEMORAL EXTENSOR MECHANISM: Intact     ARTICULAR CARTILAGE: There is greater than 50% thinning of the articular cartilage throughout the medial compartment without full-thickness chondral loss.  Lateral and patellofemoral compartment cartilage is maintained.     BONE MARROW: No acute fracture, dislocation, or marrow replacing lesion.     JOINT FLUID: Small suprapatellar joint effusion with synovitis.  Trace amount of fluid seen within the popliteal recess without a Baker's cyst.  Edema within the infrapatellar fat pad, likely reactive from prior surgery.     OTHER: No mass or loculated collection. The muscles have a normal signal intensity and bulk.  Mild soft tissue edema within the anterior knee.                Impression   CONCLUSION:     Prior partial medial meniscectomy.     No recurrent meniscal tear.     Grade 1 MCL sprain.     Mild reactive edema within the infrapatellar fat pad.                Impression:  Chronic right knee pain status post arthroscopy for repair of meniscal tear with previous MRI findings of MCL sprain and reactive edema.  On physical exam patient has decreased range of motion and patellar tenderness.        Recommendations:    Addition of over-the-counter Naprosyn twice daily.  Continue her therapy.  Await consultation with orthopedic surgeon Dr. Myers on 5/12 for further recommendations  Continue analgesics as needed per primary care      Comprehensive analgesic plan was formulated. Conservative vs. Aggressive measures were discussed at length including pharmacotherapy (eg. Anti- inflammatories, muscle relaxants, neuropathic medications, oral steroids, analgesics), injections, and further testing. Risks and benefits of all options were discussed at length to patients satisfaction during a comprehensive interactive discussion. All questions were answered during extended questions and answer session. Patient agreeable to discussion plan. Greater than 50% of the time was spent with counseling (nature of discussion centered around pain, therapy, and treatment options), face to face time, time spent reviewing data, obtaining patient information and discussing the care with the patients health care providers.     Thank you for allowing me to participate in the care of your patient.    Total time: 30 minutes    ABEL HAWKINS MD  4/29/2025  [facility] [phone]       [1]   Past Medical History:   Diabetes (HCC)    Essential hypertension    High blood pressure    High cholesterol    Osteoarthritis   [2]   Past Surgical History:  Procedure Laterality Date    Arthroscopy of joint unlisted      Arthrotomy,open repair meniscus Right     2015    Hc epidural anesthesia      labor    Other  surgical history      Abdominal Abscess surgery   [3]   Family History  Problem Relation Age of Onset    Hypertension Father     Lipids Father     Diabetes Father     Hypertension Mother     Lipids Mother     Diabetes Mother     Ovarian Cancer Mother 49   [4]   Allergies  Allergen Reactions    Cyclobenzaprine OTHER (SEE COMMENTS)     Pt stated it makes her \"shaky.\"    Raisins RASH   [5] No current facility-administered medications for this visit.  [6] [7] [8]

## 2025-04-30 ENCOUNTER — OFFICE VISIT (OUTPATIENT)
Dept: FAMILY MEDICINE CLINIC | Facility: CLINIC | Age: 45
End: 2025-04-30
Payer: COMMERCIAL

## 2025-04-30 VITALS
SYSTOLIC BLOOD PRESSURE: 156 MMHG | DIASTOLIC BLOOD PRESSURE: 93 MMHG | WEIGHT: 223 LBS | HEART RATE: 82 BPM | BODY MASS INDEX: 35.84 KG/M2 | HEIGHT: 66 IN

## 2025-04-30 DIAGNOSIS — Z00.00 ROUTINE GENERAL MEDICAL EXAMINATION AT A HEALTH CARE FACILITY: Primary | ICD-10-CM

## 2025-04-30 DIAGNOSIS — I10 ESSENTIAL HYPERTENSION: ICD-10-CM

## 2025-04-30 DIAGNOSIS — S83.231D COMPLEX TEAR OF MEDIAL MENISCUS, CURRENT INJURY, RIGHT KNEE, SUBSEQUENT ENCOUNTER: ICD-10-CM

## 2025-04-30 DIAGNOSIS — E78.2 MIXED HYPERLIPIDEMIA: ICD-10-CM

## 2025-04-30 DIAGNOSIS — Z12.11 SCREENING FOR MALIGNANT NEOPLASM OF COLON: ICD-10-CM

## 2025-04-30 DIAGNOSIS — E11.9 TYPE 2 DIABETES MELLITUS WITHOUT COMPLICATION, WITHOUT LONG-TERM CURRENT USE OF INSULIN (HCC): ICD-10-CM

## 2025-04-30 LAB — HEMOGLOBIN A1C: 6.6 % (ref 4.3–5.6)

## 2025-04-30 PROCEDURE — 83036 HEMOGLOBIN GLYCOSYLATED A1C: CPT | Performed by: PHYSICIAN ASSISTANT

## 2025-04-30 PROCEDURE — 99396 PREV VISIT EST AGE 40-64: CPT | Performed by: PHYSICIAN ASSISTANT

## 2025-04-30 RX ORDER — METFORMIN HYDROCHLORIDE 500 MG/1
TABLET, EXTENDED RELEASE ORAL
COMMUNITY
Start: 2024-05-13

## 2025-04-30 RX ORDER — MELOXICAM 15 MG/1
15 TABLET ORAL
Qty: 90 TABLET | Refills: 1 | Status: SHIPPED | OUTPATIENT
Start: 2025-04-30

## 2025-04-30 RX ORDER — CHLORTHALIDONE 25 MG/1
25 TABLET ORAL DAILY
Qty: 90 TABLET | Refills: 1 | Status: SHIPPED | OUTPATIENT
Start: 2025-04-30
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
## 2025-05-12 ENCOUNTER — OFFICE VISIT (OUTPATIENT)
Dept: ORTHOPEDICS CLINIC | Facility: CLINIC | Age: 45
End: 2025-05-12
Payer: COMMERCIAL

## 2025-05-12 VITALS — WEIGHT: 223 LBS | BODY MASS INDEX: 35.84 KG/M2 | HEIGHT: 66 IN

## 2025-05-12 DIAGNOSIS — Z98.890 HISTORY OF ARTHROSCOPY OF RIGHT KNEE: Primary | ICD-10-CM

## 2025-05-12 DIAGNOSIS — M24.661 ARTHROFIBROSIS OF KNEE JOINT, RIGHT: ICD-10-CM

## 2025-05-12 RX ORDER — TRIAMCINOLONE ACETONIDE 40 MG/ML
40 INJECTION, SUSPENSION INTRA-ARTICULAR; INTRAMUSCULAR ONCE
Status: COMPLETED | OUTPATIENT
Start: 2025-05-12 | End: 2025-05-12

## 2025-05-12 RX ADMIN — TRIAMCINOLONE ACETONIDE 40 MG: 40 INJECTION, SUSPENSION INTRA-ARTICULAR; INTRAMUSCULAR at 10:50:00

## 2025-05-12 NOTE — PROGRESS NOTES
[facility] New Patient Consult      Chief Complaint   Patient presents with    Knee Pain     RIGHT KNEE  -Previous patient of Dr. Duffy     HPI: The patient is a 44 year old female who presents at the request of Dr. Haroldo Escalante with complaints of chronic right knee pain and stiffness.  She relates a recent arthroscopy in August 2024 performed by Dr. Shane Duffy.  She has struggled with persistent pain, stiffness and limp despite surgery.  Repeat postoperative MRI is available for our review.  She denies redness, warmth, locking but occasionally feels popping localized to the anterior patellar region.  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  Postoperative x-ray and MRI are available for our review.    Past Medical History[1]  Past Surgical History[2]  Current Medications[3]  Allergies[4]  Family History[5]  Social History     Occupational History    Not on file   Tobacco Use    Smoking status: Some Days     Current packs/day: 0.15     Average packs/day: 0.2 packs/day for 24.0 years (3.6 ttl pk-yrs)     Types: Cigarettes     Passive exposure: Never    Smokeless tobacco: Never   Vaping Use    Vaping status: Never Used   Substance and Sexual Activity    Alcohol use: Not Currently     Comment: occassion    Drug use: Never    Sexual activity: Not on file        ROS:  Complete ROS reviewed by me and non-contributory to the chief complaint except as mentioned above.    Physical Exam:    Ht 5' 6\" (1.676 m)   Wt 223 lb (101.2 kg)   LMP 03/24/2025 (Approximate)   BMI 35.99 kg/m²   Constitutional: Well developed, well nourished 44 year old female  Psychological: NAD, alert and appropriate  Respiratory: Breathing comfortably on room air with RR of 10-14  Cardiac: Palpable distal pulses with pink warm extremities  Inspection right knee reveals well-healed arthroscopic portals.  There is no palpable warmth or effusion.  She is tender in the  peripatellar region with diminished patellofemoral glide compared to the left.  There is intermittent click and crepitus but a negative grind test.  Medial joint line is tender to palpation with a negative Antonietta's.  Ligaments are stable.  Range of motion is diminished estimated at 0 to 100 degrees compared to 130 on the left.  Neurovascular status is intact on sensory, motor and perfusion assessment distally.    Imaging: Multiple views right knee and recent MRI personally viewed, demonstrating postsurgical changes consistent with previous meniscectomy, early joint space narrowing primarily at the medial compartment consistent with osteoarthritis and her history of 2 arthroscopies and meniscectomy.    MRI right knee    Impression   CONCLUSION:     Prior partial medial meniscectomy.     No recurrent meniscal tear.     Grade 1 MCL sprain.     Mild reactive edema within the infrapatellar fat pad.           Dictated by (CST): Dick Anaya MD on 12/11/2024 at 8:25 PM      Finalized by (CST): Dick Anaya MD on 12/11/2024 at 8:32 PM         X-rays right knee      Impression   CONCLUSION:     Medial and patellofemoral compartment osteoarthritis is progressed since prior radiographs.       Dictated by (CST): Dick Anaya MD on 11/06/2024 at 1:47 PM      Finalized by (CST): Dick Anaya MD on 11/06/2024 at 1:49 PM           Assessment/Diagnoses:  Diagnoses and all orders for this visit:    History of arthroscopy of right knee  -     Physical Therapy Referral - [place] Locations    Arthrofibrosis of knee joint, right  -     Physical Therapy Referral - [place] Locations  -     DRAIN/INJECT LARGE JOINT/BURSA  -     triamcinolone acetonide (Kenalog-40) 40 MG/ML injection 40 mg      Plan:  I reviewed imaging and exam findings with the patient.  He has some mild to moderate osteoarthritic changes which are to be expected after 2 previous arthroscopies and substantial partial medial meniscectomy.  No new internal derangements  are suspected.  I am more concerned about arthrofibrosis where she has very poor range of motion and stiff endpoints.  We discussed his diagnosis and treatment options.  I offered a intra-articular corticosteroid injection in hopes of diminishing her inflammation and releasing adhesions.  The importance of supplemental physical therapy for aggressive passive stretching was emphasized.  Home stretching was also recommended.  My hope is that her pain and range of motion will improve over the next 4 to 6 weeks.  If she is still struggling, reassessment is advised.  All questions were answered and she verbalized understanding and appreciation    Aixa Myers MD, FAAOS  Orthopaedic Surgery   Sports Medicine/Knee and Shoulder  [facility]  t: [phone]  f: [phone]           This document was partially prepared using Dragon Medical voice recognition software.            [1]   Past Medical History:   Diabetes (HCC)    Essential hypertension    High blood pressure    High cholesterol    Osteoarthritis   [2]   Past Surgical History:  Procedure Laterality Date    Arthroscopy of joint unlisted      Arthrotomy,open repair meniscus Right     2015    Hc epidural anesthesia      labor    Other surgical history      Abdominal Abscess surgery   [3]   Current Outpatient Medications   Medication Sig Dispense Refill    metFORMIN  MG Oral Tablet 24 Hr metFORMIN  MG Oral Tablet 24 Hr, [RxNorm: 234726]      Meloxicam (MOBIC) 15 MG Oral Tab Take 1 tablet (15 mg total) by mouth daily as needed for Pain. 90 tablet 1    chlorthalidone 25 MG Oral Tab Take 1 tablet (25 mg total) by mouth daily. 90 tablet 1    traMADol 50 MG Oral Tab Take 1 tablet (50 mg total) by mouth 2 (two) times daily as needed for Pain. 60 tablet 0    atorvastatin 40 MG Oral Tab Take 1 tablet (40 mg total) by mouth in the morning.      amLODIPine 10 MG Oral Tab Take 1 tablet (10 mg total) by mouth daily. 90  tablet 3    semaglutide 4 MG/3ML Subcutaneous Solution Pen-injector Inject 1 mg into the skin once a week. 6 mL 3    gabapentin 100 MG Oral Cap Take 1 capsule (100 mg total) by mouth nightly. 90 capsule 0    losartan 100 MG Oral Tab Take 1 tablet (100 mg total) by mouth daily. 90 tablet 3    Accu-Chek Softclix Lancets Does not apply Misc in the morning.      ACCU-CHEK GUIDE TEST In Vitro Strip in the morning.     [4]   Allergies  Allergen Reactions    Cyclobenzaprine OTHER (SEE COMMENTS)     Pt stated it makes her \"shaky.\"    Raisins RASH   [5]   Family History  Problem Relation Age of Onset    Hypertension Father     Lipids Father     Diabetes Father     Hypertension Mother     Lipids Mother     Diabetes Mother     Ovarian Cancer Mother 49

## 2025-05-13 DIAGNOSIS — S83.231D COMPLEX TEAR OF MEDIAL MENISCUS, CURRENT INJURY, RIGHT KNEE, SUBSEQUENT ENCOUNTER: ICD-10-CM

## 2025-05-13 DIAGNOSIS — E11.9 TYPE 2 DIABETES MELLITUS WITHOUT COMPLICATION, WITHOUT LONG-TERM CURRENT USE OF INSULIN (HCC): ICD-10-CM

## 2025-05-13 RX ORDER — TRAMADOL HYDROCHLORIDE 50 MG/1
50 TABLET ORAL 2 TIMES DAILY PRN
Qty: 60 TABLET | Refills: 0 | Status: SHIPPED | OUTPATIENT
Start: 2025-05-13

## 2025-05-13 NOTE — TELEPHONE ENCOUNTER
Patient calling ( name and date of birth of patient verified ) she is going out of town tomorrow and  needs refills of Ozepmic  ( 1mg dose ) and Tramadol   She will be gone for over 2 weeks and only has a few  pills left     Pharmacy confirmed     Medication pended to run thru Protocol with high priority

## 2025-05-13 NOTE — TELEPHONE ENCOUNTER
Please review; protocol failed/No Protocol      Recent Fills: 03/07/2025 #30, 03/19/2025 #60, 04/18/2025 #60    Last Rx Written: 04/18/2025    Last Office Visit: 04/30/2025

## 2025-05-14 NOTE — TELEPHONE ENCOUNTER
Patient called back and was informed of message below.     Patient was instructed to call her insurance and find out what mail order pharmacy needs to be used for the Ozempic Rx.

## 2025-05-14 NOTE — TELEPHONE ENCOUNTER
Patient leaving out of town today at 6pm. Stated that needed office today to call the pharmacy that ok to fill her tramadol that was sent yesterday. Also patient on ozempic and last dose was last week, but the pharmacy will no longer fill the medication even though she has refills.     Called [facility] and advised that ok to fill the tramadol that was sent yesterday. Patient is leaving out of town today at 6pm. [facility] stated that will fill the tramadol but the ozempic per the insurance has to now go through mail order now. Can no longer go through [facility].     Left message to call back-transfer to triage.  Ask patient which mail order pharmacy she has.          Pharmacy  [id number] - [address] [phone], [phone]      Disp Refills Start End    traMADol 50 MG Oral Tab 60 tablet 0 5/13/2025 --    Sig - Route: Take 1 tablet (50 mg total) by mouth 2 (two) times daily as needed for Pain. - Oral    Sent to pharmacy as: traMADol HCl 50 MG Oral Tablet (Ultram)    E-Prescribing Status: Receipt confirmed by pharmacy (5/13/2025  3:17 PM CDT)

## 2025-06-16 DIAGNOSIS — S83.231D COMPLEX TEAR OF MEDIAL MENISCUS, CURRENT INJURY, RIGHT KNEE, SUBSEQUENT ENCOUNTER: ICD-10-CM

## 2025-06-18 RX ORDER — TRAMADOL HYDROCHLORIDE 50 MG/1
50 TABLET ORAL 2 TIMES DAILY PRN
Qty: 60 TABLET | Refills: 0 | Status: SHIPPED | OUTPATIENT
Start: 2025-06-18

## 2025-06-18 NOTE — TELEPHONE ENCOUNTER
Please review.  Protocol Failed.    Tramadol 50mg Recent fills each # 60 : 3/19, 4/18, 5/14  Last prescription written: 5/13/25  Last office visit: 4/30/2025      Requested Prescriptions   Pending Prescriptions Disp Refills    traMADol 50 MG Oral Tab 60 tablet 0     Sig: Take 1 tablet (50 mg total) by mouth 2 (two) times daily as needed for Pain.       Controlled Substance Medication Failed - 6/18/2025 10:07 AM        Failed - This medication is a controlled substance - forward to provider to refill        Passed - Medication is active on med list

## 2025-07-15 DIAGNOSIS — S83.231D COMPLEX TEAR OF MEDIAL MENISCUS, CURRENT INJURY, RIGHT KNEE, SUBSEQUENT ENCOUNTER: ICD-10-CM

## 2025-07-16 DIAGNOSIS — S83.231D COMPLEX TEAR OF MEDIAL MENISCUS, CURRENT INJURY, RIGHT KNEE, SUBSEQUENT ENCOUNTER: ICD-10-CM

## 2025-07-16 RX ORDER — TRAMADOL HYDROCHLORIDE 50 MG/1
50 TABLET ORAL 2 TIMES DAILY PRN
Qty: 60 TABLET | Refills: 0 | OUTPATIENT
Start: 2025-07-16

## 2025-07-16 NOTE — TELEPHONE ENCOUNTER
Patient is out of medication. Was taking the medication more than prescribed because she had an abscess on her abdomen. Patient was treated for the abscess on 7/6/2025 at the emergency room.

## 2025-07-16 NOTE — TELEPHONE ENCOUNTER
06/18/2025  LAST WRITTEN: 06/18/2025  Quantity: 60    Recent Visits  Date Type Provider Dept   04/30/25 Office Visit Davina Angeles PA-C [facility]   03/18/25 Office Visit Davina Angeles PA-C [facility]   02/20/25 Office Visit Davina Angeles PA-C [facility]   01/30/25 Office Visit Davina Angeles PA-C [facility]   11/20/24 Office Visit Davina Angeles PA-C [facility]   07/18/24 Office Visit Davina Angeles PA-C [facility]   06/19/24 Office Visit Davina Angeles PA-C [facility]   05/02/24 Office Visit Davina Angeles PA-C [facility]   04/19/24 Office Visit Davina Angeles PA-C [facility]   02/21/24 Office Visit Davina Angeles PA-C [facility]   Showing recent visits within past 540 days with a meds authorizing provider and meeting all other requirements  Future Appointments  No visits were found meeting these conditions.  Showing future appointments within next 150 days with a meds authorizing provider and meeting all other requirements

## 2025-07-17 RX ORDER — TRAMADOL HYDROCHLORIDE 50 MG/1
50 TABLET ORAL 2 TIMES DAILY PRN
Qty: 60 TABLET | Refills: 0 | Status: SHIPPED | OUTPATIENT
Start: 2025-07-17

## 2025-07-17 NOTE — TELEPHONE ENCOUNTER
Patient called to follow up.  She is currently on antibiotics for her abscess and is requesting refill to help control pain. Advised we are awaiting provider review and approval.

## 2025-08-14 DIAGNOSIS — S83.231D COMPLEX TEAR OF MEDIAL MENISCUS, CURRENT INJURY, RIGHT KNEE, SUBSEQUENT ENCOUNTER: ICD-10-CM

## 2025-08-14 RX ORDER — TRAMADOL HYDROCHLORIDE 50 MG/1
50 TABLET ORAL 2 TIMES DAILY PRN
Qty: 60 TABLET | Refills: 0 | Status: SHIPPED | OUTPATIENT
Start: 2025-08-14

## (undated) DEVICE — BLADE SHV L13CM DIA4MM EXCALIBUR AGG COOLCUT

## (undated) DEVICE — SUCTION CANISTER, 3000CC,SAFELINER: Brand: DEROYAL

## (undated) DEVICE — SOLUTION IRRIG 3000ML 0.9% NACL FLX CONT

## (undated) DEVICE — TUBING PMP L16FT DISP INFLOW

## (undated) DEVICE — MEDI-VAC NON-CONDUCTIVE SUCTION TUBING: Brand: CARDINAL HEALTH

## (undated) DEVICE — GAMMEX® NON-LATEX PI ORTHO SIZE 7, STERILE POLYISOPRENE POWDER-FREE SURGICAL GLOVE: Brand: GAMMEX

## (undated) DEVICE — APPLICATOR PREP 26ML CHG 2% ISO ALC 70%

## (undated) DEVICE — GAMMEX® NON-LATEX PI ORTHO SIZE 8, STERILE POLYISOPRENE POWDER-FREE SURGICAL GLOVE: Brand: GAMMEX

## (undated) DEVICE — GAMMEX® PI HYBRID SIZE 6.5, STERILE POWDER-FREE SURGICAL GLOVE, POLYISOPRENE AND NEOPRENE BLEND: Brand: GAMMEX

## (undated) DEVICE — SUT ETHLN 4-0 18IN FS-2 NABSRB BLK 19MM 3/8 C

## (undated) DEVICE — ARTHROSCOPY: Brand: MEDLINE INDUSTRIES, INC.

## (undated) DEVICE — AMBIENT SUPER MULTIVAC 50 WITH                                    INTEGRATED FINGER SWITCHES IFS: Brand: COBLATION

## (undated) DEVICE — GAMMEX® PI HYBRID SIZE 7.5, STERILE POWDER-FREE SURGICAL GLOVE, POLYISOPRENE AND NEOPRENE BLEND: Brand: GAMMEX

## (undated) NOTE — LETTER
AUTHORIZATION FOR SURGICAL OPERATION OR OTHER PROCEDURE    1. I hereby authorize Dr. Duffy/ Lakeisha Burns PA-C, and [facility] staff assigned to my case to perform the following operation and/or procedure at the [facility]:    _______________________________________________________________________________________________    Cortisone injection to Right Knee  _______________________________________________________________________________________________    2.  My physician has explained the nature and purpose of the operation or other procedure, possible alternative methods of treatment, the risks involved, and the possibility of complication to me.  I acknowledge that no guarantee has been made as to the result that may be obtained.  3.  I recognize that, during the course of this operation, or other procedure, unforseen conditions may necessitate additional or different procedure than those listed above.  I, therefore, further authorize and request that the above named physician, his/her physician assistants or designees perform such procedures as are, in his/her professional opinion, necessary and desirable.  4.  Any tissue or organs removed in the operation or other procedure may be disposed of by and at the discretion of the [facility] and [facility].  5.  I understand that in the event of a medical emergency, I will be transported by local paramedics to [facility] or other hospital emergency department.  6.  I certify that I have read and fully understand the above consent to operation and/or other procedure.    7.  I acknowledge that my physician has explained sedation/analgesia administration to me including the risks and benefits.  I consent to the administration of sedation/analgesia as may be necessary or desirable in the judgement of my physician.    Witness signature: ___________________________________________________ Date:   ______/______/_____                    Time:  ________ A.M.  P.M.       Patient Name:  ______________________________________________________  (please print)      Patient signature:  ___________________________________________________             Relationship to Patient:           []  Parent    Responsible person                          []  Spouse  In case of minor or                    [] Other  _____________   Incompetent name:  __________________________________________________                               (please print)      _____________      Responsible person  In case of minor or  Incompetent signature:  _______________________________________________    Statement of Physician  My signature below affirms that prior to the time of the procedure, I have explained to the patient and/or his/her guardian, the risks and benefits involved in the proposed treatment and any reasonable alternative to the proposed treatment.  I have also explained the risks and benefits involved in the refusal of the proposed treatment and have answered the patient's questions.                        Date:  ______/______/_______  Provider                      Signature:  __________________________________________________________       Time:  ___________ A.M    P.M.

## (undated) NOTE — LETTER
03/15/21        [address] [facility] [address]      Dear Romeo Barr,    [address] [facility] records indicate that you have outstanding lab work and or testing that was ordered for you and has not yet been completed:  Orders Placed This Enc

## (undated) NOTE — LETTER
Department: [facility]  Phone: [phone]  Ordering Provider: Olena Fraga  Ordering Provider Address: [facility] [address]  Ordering Provider Phone: [phone]  Ordering Provider Fax: [phone]

## (undated) NOTE — LETTER
2/4/2025      To Whom It May Concern:    Tasia Dawson is currently under my medical care.    She may return to work on 2/4/2025.  Activity is restricted as follows: none.    If you require additional information please contact our office.      Sincerely,    Davina Angeles PA-C  [address] [place] [address]  Ph: [phone]  Fax: [phone]

## (undated) NOTE — LETTER
06/15/21        [address] [facility] [address]      Dear Ayala Etienne,    [address] [facility] records indicate that you have outstanding lab work and or testing that was ordered for you and has not yet been completed:  Orders Placed This Enc

## (undated) NOTE — LETTER
10/04/21        [address] [facility] [address]      Dear Alexx Grissom,    [address] [facility] records indicate that you have outstanding lab work and or testing that was ordered for you and has not yet been completed:  Orders Placed This Enc

## (undated) NOTE — LETTER
2/22/2022          To Whom It May Concern:    Aminah Rincon is currently under my medical care and may not return to work at this time. She may return to work on 03/02/2022. Activity is restricted as follows: none. If you require additional information please contact our office.         Sincerely,    Jeannie Ng PA-C          Document generated by:  Jeannie gN PA-C

## (undated) NOTE — LETTER
9/17/2024          To Whom It May Concern:    Tasia Dawson is currently under my medical care.  She may return to work on 10/05/2024 with the following activity restrictions: Light duty, no lifting over 20 lbs.    If you require additional information please contact our office.        Sincerely,    PETRONA Kramer

## (undated) NOTE — LETTER
OPIOID TREATMENT AGREEMENT    For Pain Management      Please read each statement, initial at the bottom of each page, and sign the last page to indicate your agreement with this form. If you have any questions about any information in this form or the opioid treatment plan, please request immediate clarification from your physician or health care provider.     The purpose of this agreement is to give you information about the medications you will be taking for pain management and to assure that you and your physician/health care provider comply with state and federal regulations concerning the prescribing of controlled substances. A trial of opioid therapy can be considered for moderate to severe pain with the intent of reducing pain and increasing function. The physician’s goal is for you to have the best quality of life possible given the reality of your clinical condition. The success of treatment depends on mutual trust and honesty in the physician/patient relationship and full agreement and understanding of the risks and benefits of using opioids to treat pain.    I agree to use opioids (morphine-like drugs) as part of my treatment for chronic pain.  I understand that these drugs can be effective, but have a high potential for misuse and are therefore closely controlled by the local, state, and federal government. I understand that my physician/health care provider is prescribing such medication to help manage my pain, and I agree to the following conditions as part of my treatment plan    I am responsible for my pain medications.  I agree to take the medication only as prescribed.    I understand that increasing my dose without the close supervision of my physician could    lead to drug overdose causing severe sedation and respiratory depression and death.    I understand that decreasing or stopping my medication without the close supervision of my physician can lead to withdrawal. Withdrawal symptoms can  include yawning, sweating, watery eyes, runny nose, anxiety, tremors, aching muscles, hot and cold flashes, “goose bumps”, abdominal cramps, and diarrhea.  These symptoms can occur 24-48 hours after the last dose and can last up to 3 weeks.    I will not request or accept controlled substance medication from any other physician or individual while I am receiving such medication from my physician/health care provider at the clinic.    I acknowledge that there are side effects with opioid therapy, and I understand it is my responsibility to notify my physician/health care provider for any side effect that continue or are severe (i.e., difficulty breathing, slow heart rate, sedation, confusion).  I am also responsible for notifying my pain physician immediately if I need to visit another physician or need to visit an emergency room due to pain, of if I become pregnant.    I understand that the opioid medication is strictly for my own use and I agree not to give or sell my medication to others because it may endanger another person’s health and is against the law.    I will inform my physician of all medications I am taking, including herbal remedies.  Medications like Valium or Ativan; sedatives such as Soma, Xanax, Fiorinal; antihistamines like Benadryl; herbal remedies, alcohol, and cough syrup containing alcohol, codeine, or hydrocodone can interact with opioids and produce serious side effects.    I understand I will be expected to return to the clinic as instructed by my provider during the time any of my medication is being adjusted.    I understand that any evidence of drug hoarding, acquisition of any opioid medication or adjunctive analgesia from other physicians (which includes emergency rooms), uncontrolled dose escalation or reduction, loss of prescriptions or failure to follow agreement may result in change to the treatment plan, referral to the Medication Assisted Therapy Program, an may result in  termination of the physician/patient relationship.    I will not use any illicit substances, such as cocaine, marijuana, etc. while taking these medications.  I understand that use of any illicit substances while taking these medications may result in a change to my treatment plan, referral to the [facility], and may result in termination of the physician/patient relationship.    I understand that I should not consume alcohol while taking these medications because the use of alcohol together with opioid medications is warned against.    I am responsible for my opioid prescriptions.  I understand that:    Refill prescriptions can be written for a one month supply and increased to a maximum of two months at the discretion of the provider.    It is my responsibility to schedule appointments for the next opioid refill to ensure I do not run out of medications.    I am responsible for keeping my prescriptions and pain medications in a safe and secure place, such as a locked cabinet or safe.  I am expected to protect my medications from loss or theft.  I am responsible for taking the medication in the dose prescribed and for keeping track of the amount remaining.  If my medication is stolen, I will report this to my local police department and obtain a stolen item report.  I will then report the stolen medication to my physician.  If my medications are lost, misplaced, or stolen, my physician may choose not to replace the medications.    Refills issued by physicians/health care providers in this clinic can only be filled by a pharmacy in the State of [place], even if I am a resident of another state.    Prescriptions for pain medicine or any other prescriptions will be written only during an office visit or during regular office hours.  No refills of any medications during the evening or on weekends.    I must bring back all opioid medications and adjunctive medications prescribed by my physician  in the original containers/bottles at every visit.    Prescriptions will not be written in advance due to vacations, meetings, or other commitments.    If an appointment for a prescription refill is missed, another appointment will be made as soon as possible.  Immediate or emergency appointments will not be possible.    I understand while physical dependence is to be expected after long-term use of opioids, signs of addiction, abuse, or misuse shall prompt the need for substance dependence treatment as well as weaning and detoxification from the opioids.  I further understand the following:    Physical dependence is common to many drugs such as blood pressure medications, anti-seizure medications, and opioids.  It results in biochemical changes such that abruptly stopping these drugs will cause a withdrawal response.  It should be noted that physical dependence does not equal addiction.  A person can be dependent on insulin to treat diabetes or dependent on prednisone (steroids) to treat asthma, but not addicted to the insulin or prednisone.    Addiction is a primary, chronic neurobiologic disease with genetic, psychosocial and environmental factors influencing its development and manifestation.  It is characterized by behavior that includes one or more of the following: impaired control over drug use, compulsive use, continued use despite harm, and cravings.  This means the drug decreases a person’s quality of life.  If a patient exhibits such behavior, the drug will be tapered and the patient will not be a candidate for an opioid trial.  He/she will be referred to an addiction medicine specialist.    Tolerance means a state of adaption in which exposure to the drug induces changes that result in a lessening of one or more of the drug’s effects over time.  The dose of the opioid may have to be adjusted up or down to a dose that produces maximum function and a realistic decrease of the patient’s pain.    If it  appears to my physician/health care provider that there is no improvement in my daily function or quality of life from the controlled substance, I understand my opioids may be discontinued.    If I have a history of alcohol or drug misuse/addiction, I must notify the physician of such history since the treatment with opioids for may increase the possibility of relapse.    I agree and understand that my physician reserves the right to perform random or unannounced urine drug testing.  If requested to provide a urine sample, I agree to cooperate.  If I decide not to provide a urine sample, I understand that my physician may change my treatment plan, including discontinuation of my opioid medications when applicable or complete termination of the physician/patient relationship.  The presence of non-prescribed drug(s) or illicit drug(s) in the urine can be grounds for termination of the physician/patient relationship.  Urine drug testing is not forensic testing, but is done for my benefit as a diagnostic tool and in accordance with certain legal and regulatory guidance on the use of controlled substances to treat pain.    I agree to allow my physician/[place] care provider to contact any health care professional, including behavioral health, family member, pharmacy, legal authority, or regulator agency to obtain or provide information about my care or actions if the physician feels it is necessary.    I understand that non-compliance with the above conditions may result in a re-evaluation of my treatment plan, referral to the [facility] Therapy [facility], discontinuation of opioid therapy, and possible discharge from the clinic.    I agree to work closely with my physician/health care provider to assure the agreed plan of care is followed.    I acknowledge that I have received a copy of this agreement for my records.          I __________________________________________ have read the above information or it has been  read to me.  All my questions regarding the treatment of pain with opioids have been answered to my satisfaction, and I understand all of the conditions of my participation in the opioid treatment plan listed above.  I hereby agree to the conditions listed about and consent to participate in the opioid medication therapy.        ______________________________    ____________    ______________________________              Patient Signature                                  Date                        Patient Printed Name  ______________________________    ____________    ______________________________              Witness Signature                                Date                       Witness Printed Name

## (undated) NOTE — LETTER
2/23/2024      To Whom It May Concern:    Tasia Dawson is currently under my medical care.  Activity is restricted as follows: no lifting, no pulling, no pushing for 7 days.    If you require additional information please contact our office.      Sincerely,    Davina Angeles PA-C  No primary provider on file.   [phone]      Document generated by:  Gita YAÑEZ RN

## (undated) NOTE — LETTER
09/15/21        [address] [facility] [address]      Dear GLORIA,    [address] [facility] records indicate that you have outstanding lab work and or testing that was ordered for you and has not yet been completed:  Orders Placed This Enc